# Patient Record
Sex: FEMALE | Race: BLACK OR AFRICAN AMERICAN | NOT HISPANIC OR LATINO | Employment: FULL TIME | URBAN - METROPOLITAN AREA
[De-identification: names, ages, dates, MRNs, and addresses within clinical notes are randomized per-mention and may not be internally consistent; named-entity substitution may affect disease eponyms.]

---

## 2018-08-14 PROBLEM — M16.11 PRIMARY OSTEOARTHRITIS OF RIGHT HIP: Status: ACTIVE | Noted: 2018-08-14

## 2018-08-14 NOTE — H&P
Subjective:     Long history of worsening pain in the right hip.  Multiple arthritic conditions all over her body.  Prior left hip replacement has done well over the years now requesting on the right    Patient Active Problem List    Diagnosis Date Noted    Primary osteoarthritis of right hip 08/14/2018    Coracoid impingement of left shoulder 07/15/2016     Past Medical History:   Diagnosis Date    Arthritis       Past Surgical History:   Procedure Laterality Date    CHOLECYSTECTOMY      HYSTERECTOMY      JOINT REPLACEMENT Left     hip, bilateral knees    TONSILLECTOMY        No medications prior to admission.     Review of patient's allergies indicates:   Allergen Reactions    Percocet [oxycodone-acetaminophen] Other (See Comments)     Hallucinations  Can take Ultram    Vicodin [hydrocodone-acetaminophen] Other (See Comments)     Hallucinations      Social History     Tobacco Use    Smoking status: Never Smoker   Substance Use Topics    Alcohol use: Yes     Alcohol/week: 0.6 oz     Types: 1 Glasses of wine per week     Comment: occasionally      No family history on file.   Review of Systems  Pertinent items are noted in HPI.    Objective:     No data found.  Heart regular lungs clear decreased rotation right hip with pain in the right groin region in significant limp    Imaging Review  Loss of joint space with sclerosis osteophyte formation    Assessment:     Active Hospital Problems    Diagnosis  POA    *Primary osteoarthritis of right hip [M16.11]  Yes      Resolved Hospital Problems   No resolved problems to display.       Plan:     The various methods of treatment have been discussed with the patient and family.   After consideration of risks, benefits and other options for treatment, the patient has consented to surgical interventions (Significant risks discussed hip replacement hopefully same success as on the left).  Questions were answered and Pre-op teaching was done by me.

## 2018-08-17 ENCOUNTER — HOSPITAL ENCOUNTER (OUTPATIENT)
Dept: PREADMISSION TESTING | Facility: OTHER | Age: 73
Discharge: HOME | End: 2018-08-17
Attending: ORTHOPAEDIC SURGERY
Payer: COMMERCIAL

## 2018-08-17 ENCOUNTER — ANESTHESIA EVENT (OUTPATIENT)
Dept: SURGERY | Facility: OTHER | Age: 73
DRG: 470 | End: 2018-08-17
Payer: COMMERCIAL

## 2018-08-17 VITALS
BODY MASS INDEX: 33.34 KG/M2 | OXYGEN SATURATION: 98 % | SYSTOLIC BLOOD PRESSURE: 134 MMHG | HEART RATE: 51 BPM | DIASTOLIC BLOOD PRESSURE: 71 MMHG | WEIGHT: 220 LBS | TEMPERATURE: 99 F | HEIGHT: 68 IN

## 2018-08-17 LAB
ANION GAP SERPL CALC-SCNC: 8 MMOL/L
BACTERIA #/AREA URNS HPF: ABNORMAL /HPF
BASOPHILS # BLD AUTO: 0.04 K/UL
BASOPHILS NFR BLD: 0.7 %
BILIRUB UR QL STRIP: NEGATIVE
BUN SERPL-MCNC: 17 MG/DL
CALCIUM SERPL-MCNC: 9.8 MG/DL
CHLORIDE SERPL-SCNC: 105 MMOL/L
CLARITY UR: CLEAR
CO2 SERPL-SCNC: 26 MMOL/L
COLOR UR: YELLOW
CREAT SERPL-MCNC: 1 MG/DL
DIFFERENTIAL METHOD: ABNORMAL
EOSINOPHIL # BLD AUTO: 0.3 K/UL
EOSINOPHIL NFR BLD: 5.3 %
ERYTHROCYTE [DISTWIDTH] IN BLOOD BY AUTOMATED COUNT: 15 %
EST. GFR  (AFRICAN AMERICAN): >60 ML/MIN/1.73 M^2
EST. GFR  (NON AFRICAN AMERICAN): 56 ML/MIN/1.73 M^2
GLUCOSE SERPL-MCNC: 94 MG/DL
GLUCOSE UR QL STRIP: NEGATIVE
HCT VFR BLD AUTO: 39.3 %
HGB BLD-MCNC: 12.4 G/DL
HGB UR QL STRIP: NEGATIVE
KETONES UR QL STRIP: NEGATIVE
LEUKOCYTE ESTERASE UR QL STRIP: ABNORMAL
LYMPHOCYTES # BLD AUTO: 2 K/UL
LYMPHOCYTES NFR BLD: 35.9 %
MCH RBC QN AUTO: 25.3 PG
MCHC RBC AUTO-ENTMCNC: 31.6 G/DL
MCV RBC AUTO: 80 FL
MICROSCOPIC COMMENT: ABNORMAL
MONOCYTES # BLD AUTO: 0.5 K/UL
MONOCYTES NFR BLD: 8.4 %
NEUTROPHILS # BLD AUTO: 2.8 K/UL
NEUTROPHILS NFR BLD: 49.5 %
NITRITE UR QL STRIP: NEGATIVE
PH UR STRIP: 6 [PH] (ref 5–8)
PLATELET # BLD AUTO: 246 K/UL
PMV BLD AUTO: 10.7 FL
POTASSIUM SERPL-SCNC: 4.3 MMOL/L
PROT UR QL STRIP: NEGATIVE
RBC # BLD AUTO: 4.91 M/UL
RBC #/AREA URNS HPF: 2 /HPF (ref 0–4)
SODIUM SERPL-SCNC: 139 MMOL/L
SP GR UR STRIP: >=1.03 (ref 1–1.03)
SQUAMOUS #/AREA URNS HPF: 9 /HPF
URN SPEC COLLECT METH UR: ABNORMAL
UROBILINOGEN UR STRIP-ACNC: NEGATIVE EU/DL
WBC # BLD AUTO: 5.69 K/UL
WBC #/AREA URNS HPF: 7 /HPF (ref 0–5)

## 2018-08-17 PROCEDURE — 36415 COLL VENOUS BLD VENIPUNCTURE: CPT

## 2018-08-17 PROCEDURE — 81000 URINALYSIS NONAUTO W/SCOPE: CPT

## 2018-08-17 PROCEDURE — 85025 COMPLETE CBC W/AUTO DIFF WBC: CPT

## 2018-08-17 PROCEDURE — 80048 BASIC METABOLIC PNL TOTAL CA: CPT

## 2018-08-17 RX ORDER — SODIUM CHLORIDE, SODIUM LACTATE, POTASSIUM CHLORIDE, CALCIUM CHLORIDE 600; 310; 30; 20 MG/100ML; MG/100ML; MG/100ML; MG/100ML
INJECTION, SOLUTION INTRAVENOUS CONTINUOUS
Status: CANCELLED | OUTPATIENT
Start: 2018-08-17

## 2018-08-17 RX ORDER — FLUCONAZOLE 150 MG/1
150 TABLET ORAL DAILY
COMMUNITY
End: 2018-10-10

## 2018-08-17 RX ORDER — IBUPROFEN AND FAMOTIDINE 26.6; 8 MG/1; MG/1
1 TABLET ORAL 3 TIMES DAILY
COMMUNITY
End: 2018-10-10

## 2018-08-17 NOTE — DISCHARGE INSTRUCTIONS
PRE-ADMIT TESTING -  [phone]    [address]  [place] [facility]          Your surgery has been scheduled at [facility]. We are pleased to have the opportunity to serve you. For Further Information please call [phone].    On the day of surgery please report to the Information Desk on the 1st floor.    · CONTACT YOUR PHYSICIAN'S OFFICE THE DAY PRIOR TO YOUR SURGERY TO OBTAIN YOUR ARRIVAL TIME.     · The evening before surgery do not eat anything after 9 p.m. ( this includes hard candy, chewing gum and mints).  You may only have GATORADE, POWERADE AND WATER  from 9 p.m. until you leave your home.   DO NOT DRINK ANY LIQUIDS ON THE WAY TO THE HOSPITAL.      SPECIAL MEDICATION INSTRUCTIONS: TAKE medications checked off by the Anesthesiologist on your Medication List.    Angiogram Patients: Take medications as instructed by your physician, including aspirin.     Surgery Patients:    If you take ASPIRIN - Your PHYSICIAN/SURGEON will need to inform you IF/OR when you need to stop taking aspirin prior to your surgery.     Do Not take any medications containing IBUPROFEN.  Do Not Wear any make-up or dark nail polish   (especially eye make-up) to surgery. If you come to surgery with makeup on you will be required to remove the makeup or nail polish.    Do not shave your surgical area at least 5 days prior to your surgery. The surgical prep will be performed at the hospital according to Infection Control regulations.    Leave all valuables at home.   Do Not wear any jewelry or watches, including any metal in body piercings.  Contact Lens must be removed before surgery. Either do not wear the contact lens or bring a case and solution for storage.  Please bring a container for eyeglasses or dentures as required.  Bring any paperwork your physician has provided, such as consent forms,  history and physicals, doctor's orders, etc.   Bring comfortable clothes that are loose fitting to wear upon  discharge. Take into consideration the type of surgery being performed.  Maintain your diet as advised per your physician the day prior to surgery.      Adequate rest the night before surgery is advised.   Park in the Parking lot behind the hospital or in the [facility] across the street from the parking lot. Parking is complimentary.  If you will be discharged the same day as your procedure, please arrange for a responsible adult to drive you home or to accompany you if traveling by taxi.   YOU WILL NOT BE PERMITTED TO DRIVE OR TO LEAVE THE HOSPITAL ALONE AFTER SURGERY.   It is strongly recommended that you arrange for someone to remain with you for the first 24 hrs following your surgery.       Thank you for your cooperation.  The Staff of [facility].        Bathing Instructions                                                                 Please shower the evening before and morning of your procedure with    ANTIBACTERIAL SOAP. ( DIAL, etc )  Concentrate on the surgical area   for at least 3 minutes and rinse completely. Dry off as usual.   Do not use any deodorant, powder, body lotions, perfume, after shave or    cologne.

## 2018-08-17 NOTE — ANESTHESIA PREPROCEDURE EVALUATION
08/17/2018  Thao Perera is a 72 y.o., female.    Anesthesia Evaluation    I have reviewed the Patient Summary Reports.    I have reviewed the Nursing Notes.   I have reviewed the Medications.     Review of Systems  Anesthesia Hx:  No problems with previous Anesthesia  Denies Family Hx of Anesthesia complications.   Denies Personal Hx of Anesthesia complications.   Social:  Non-Smoker    Hematology/Oncology:  Hematology Normal   Oncology Normal     EENT/Dental:EENT/Dental Normal   Cardiovascular:  Cardiovascular Normal Exercise tolerance: good     Pulmonary:  Pulmonary Normal    Renal/:  Renal/ Normal     Musculoskeletal:   Arthritis     Neurological:  Neurology Normal    Endocrine:  Endocrine Normal    Dermatological:  Skin Normal    Psych:  Psychiatric Normal           Physical Exam  General:  Obesity    Airway/Jaw/Neck:  Airway Findings: Mouth Opening: Normal Tongue: Normal  General Airway Assessment: Adult  Mallampati: I  TM Distance: Normal, at least 6 cm  Jaw/Neck Findings:  Neck ROM: Normal ROM      Dental:  Dental Findings: In tact             Anesthesia Plan  Type of Anesthesia, risks & benefits discussed:  Anesthesia Type:  general, spinal  Patient's Preference:   Intra-op Monitoring Plan:   Intra-op Monitoring Plan Comments:   Post Op Pain Control Plan:   Post Op Pain Control Plan Comments:   Induction:    Beta Blocker:         Informed Consent: Patient understands risks and agrees with Anesthesia plan.  Questions answered.   ASA Score: 2     Day of Surgery Review of History & Physical:    H&P update referred to the surgeon.     Anesthesia Plan Notes: Patient leaning toward spinal but will decide on day of. May still be on ibuprofen on day of surgery so may factor in.        Ready For Surgery From Anesthesia Perspective.

## 2018-08-28 NOTE — NURSING
Total Joint Replacement Questionnaire     Thao CAMDEN Perera participated in Joint Class over the telephone  1. Have you ever had a Joint Replacement?   [x]Yes  [] No    2. How many stairs/steps do you have to enter your home? 0  When going up, on what side is the railing?  [] Left  [] Right  [] Bilateral  [x] None    3. Do you own any Durable Medical Equipment?   [x] Rolling Walker  [] Standard Walker  [] Rollator  [x] Cane  [] Crutches  [x] Bed Side Commode  [] Hip Kit  [] Tub Transfer Bench  [] Shower Chair     4. Have you used a Home Health Company before?   [x] Yes  [] No  If Yes, Name of Company: family  Would you like to use this company again?   [x] Yes  [] No  [x] Would you like to use your physician's preference?  [x] Yes  [] No    5. Have you arranged for someone to help you, for at least for 3 days, when you are discharged from the hospital?  [x] Yes  [] No  If Yes, Name(s) of person assisting: carissa Ortega  8/28/2018

## 2018-08-28 NOTE — DISCHARGE INSTRUCTIONS
Hip Replacement Discharge Instructions    1. Pain:  a. After surgery you may feel some pain in the operative leg groin area. This is normal. Your hip will likely have been injected with a numbing medicine (Exparel) prior to completion of surgery for pain control. This is indicated on a green bracelet that you will wear for 4 days after surgery. You will also get a prescription for pain control before you leave the hospital.  b. Elevate your leg when sitting for comfort  2. Incision Care:  a. Some drainage from the incision in the first 72 hours is normal. If drainage is excessive, remove bandage,  pat dry, cover with sterile gauze, and secure with tape. Notify M.D. for excessive drainage.  b. Staples will be removed 14 days after surgery.  3. Activity:  a. See attached hip precautions and follow for 6 weeks (instructions found at the end of packet):  b. Perform exercises 3 times a day.  c. Use a hard, flat surface, such as a firm mattress, when exercising.  d. .DR HERNANDEZ PATIENTS CANNOT SHOWER UNTIL STAPLES ARE REMOVED. Support/help is mandatory during showering. If dressing becomes wet, replace with a new dressing. No bathing or swimming for 6 weeks or until incision is completely healed.  e. Wear migel hose for 3 weeks after surgery. You may remove for 1-2 hours during the day only.Send patient home with an extra pair migel hose.  4. Safety:  a. Add cushions to low chairs and car seats for elevation.  b. When getting in and out of a car, it is important to keep your leg straight and out to the side. Wear a seatbelt at all times.  c. You will be given a raised toilet seat (3-1 commode).  d. Use a walker, cane, or crutches as long as M.D. recommends.  5. Possible Complications: Report to Surgeon  a. Infection  i. Unexpected redness  ii. Persistent drainage  iii. Temperature ,can be treated with tylenol. Do not go to the emergency room or urgent care for a temperature, call your surgeon.   iii. Additional  swelling  iv. Pain not controlled with current pain medicine  b. Blood clot  i. Unusual pain  ii. Red or discolored skin  iii. Swelling  iv. Unusual warm skin  c. Dislocation  i. Severe hip pain especially when moved  ii. The injured leg is shorter than the uninjured leg  iii. The injured leg lies in an abnormal position. In most cases the leg is bent at the hip, turned inward and pulled toward the middle of the body.

## 2018-09-13 ENCOUNTER — ANESTHESIA (OUTPATIENT)
Dept: SURGERY | Facility: OTHER | Age: 73
DRG: 470 | End: 2018-09-13
Payer: COMMERCIAL

## 2018-09-13 ENCOUNTER — HOSPITAL ENCOUNTER (INPATIENT)
Facility: OTHER | Age: 73
LOS: 2 days | Discharge: HOME HEALTH | DRG: 470 | End: 2018-09-15
Attending: ORTHOPAEDIC SURGERY | Admitting: ORTHOPAEDIC SURGERY
Payer: COMMERCIAL

## 2018-09-13 DIAGNOSIS — M16.9 OA (OSTEOARTHRITIS) OF HIP: ICD-10-CM

## 2018-09-13 DIAGNOSIS — M16.11 PRIMARY OSTEOARTHRITIS OF RIGHT HIP: Primary | ICD-10-CM

## 2018-09-13 PROCEDURE — 63600175 PHARM REV CODE 636 W HCPCS: Performed by: ANESTHESIOLOGY

## 2018-09-13 PROCEDURE — 94761 N-INVAS EAR/PLS OXIMETRY MLT: CPT

## 2018-09-13 PROCEDURE — 71000033 HC RECOVERY, INTIAL HOUR: Performed by: ORTHOPAEDIC SURGERY

## 2018-09-13 PROCEDURE — 25000003 PHARM REV CODE 250: Performed by: INTERNAL MEDICINE

## 2018-09-13 PROCEDURE — 25000003 PHARM REV CODE 250: Performed by: ORTHOPAEDIC SURGERY

## 2018-09-13 PROCEDURE — 94799 UNLISTED PULMONARY SVC/PX: CPT

## 2018-09-13 PROCEDURE — 25000003 PHARM REV CODE 250: Performed by: ANESTHESIOLOGY

## 2018-09-13 PROCEDURE — 11000001 HC ACUTE MED/SURG PRIVATE ROOM

## 2018-09-13 PROCEDURE — 82495 ASSAY OF CHROMIUM: CPT

## 2018-09-13 PROCEDURE — 25000003 PHARM REV CODE 250: Performed by: NURSE ANESTHETIST, CERTIFIED REGISTERED

## 2018-09-13 PROCEDURE — 99900035 HC TECH TIME PER 15 MIN (STAT)

## 2018-09-13 PROCEDURE — 83018 HEAVY METAL QUAN EACH NES: CPT

## 2018-09-13 PROCEDURE — C1776 JOINT DEVICE (IMPLANTABLE): HCPCS | Performed by: ORTHOPAEDIC SURGERY

## 2018-09-13 PROCEDURE — C9290 INJ, BUPIVACAINE LIPOSOME: HCPCS | Performed by: ORTHOPAEDIC SURGERY

## 2018-09-13 PROCEDURE — 88304 TISSUE EXAM BY PATHOLOGIST: CPT | Mod: 26,,, | Performed by: PATHOLOGY

## 2018-09-13 PROCEDURE — 63600175 PHARM REV CODE 636 W HCPCS: Performed by: ORTHOPAEDIC SURGERY

## 2018-09-13 PROCEDURE — 88311 DECALCIFY TISSUE: CPT | Mod: 26,,, | Performed by: PATHOLOGY

## 2018-09-13 PROCEDURE — 71000039 HC RECOVERY, EACH ADD'L HOUR: Performed by: ORTHOPAEDIC SURGERY

## 2018-09-13 PROCEDURE — 97161 PT EVAL LOW COMPLEX 20 MIN: CPT

## 2018-09-13 PROCEDURE — 97116 GAIT TRAINING THERAPY: CPT

## 2018-09-13 PROCEDURE — 63600175 PHARM REV CODE 636 W HCPCS: Performed by: NURSE ANESTHETIST, CERTIFIED REGISTERED

## 2018-09-13 PROCEDURE — 37000008 HC ANESTHESIA 1ST 15 MINUTES: Performed by: ORTHOPAEDIC SURGERY

## 2018-09-13 PROCEDURE — 88311 DECALCIFY TISSUE: CPT | Performed by: PATHOLOGY

## 2018-09-13 PROCEDURE — 37000009 HC ANESTHESIA EA ADD 15 MINS: Performed by: ORTHOPAEDIC SURGERY

## 2018-09-13 PROCEDURE — 36000710: Performed by: ORTHOPAEDIC SURGERY

## 2018-09-13 PROCEDURE — 36000711: Performed by: ORTHOPAEDIC SURGERY

## 2018-09-13 PROCEDURE — 27201423 OPTIME MED/SURG SUP & DEVICES STERILE SUPPLY: Performed by: ORTHOPAEDIC SURGERY

## 2018-09-13 PROCEDURE — 97530 THERAPEUTIC ACTIVITIES: CPT

## 2018-09-13 PROCEDURE — 36415 COLL VENOUS BLD VENIPUNCTURE: CPT

## 2018-09-13 DEVICE — HEAD FEMORAL BIOLOX 28MM: Type: IMPLANTABLE DEVICE | Site: HIP | Status: FUNCTIONAL

## 2018-09-13 DEVICE — FEMORAL ADPT TYPE 1 TAPER 6MM: Type: IMPLANTABLE DEVICE | Site: HIP | Status: FUNCTIONAL

## 2018-09-13 DEVICE — IMPLANTABLE DEVICE: Type: IMPLANTABLE DEVICE | Site: HIP | Status: FUNCTIONAL

## 2018-09-13 DEVICE — SHELL 3HOLE 50MM G7 OSSEOTI: Type: IMPLANTABLE DEVICE | Site: HIP | Status: FUNCTIONAL

## 2018-09-13 RX ORDER — HYDROCODONE BITARTRATE AND ACETAMINOPHEN 10; 325 MG/1; MG/1
1 TABLET ORAL EVERY 4 HOURS PRN
Status: DISCONTINUED | OUTPATIENT
Start: 2018-09-13 | End: 2018-09-13

## 2018-09-13 RX ORDER — SODIUM CHLORIDE, SODIUM LACTATE, POTASSIUM CHLORIDE, CALCIUM CHLORIDE 600; 310; 30; 20 MG/100ML; MG/100ML; MG/100ML; MG/100ML
INJECTION, SOLUTION INTRAVENOUS CONTINUOUS
Status: DISCONTINUED | OUTPATIENT
Start: 2018-09-13 | End: 2018-09-13

## 2018-09-13 RX ORDER — HYDROCODONE BITARTRATE AND ACETAMINOPHEN 5; 325 MG/1; MG/1
1 TABLET ORAL EVERY 4 HOURS PRN
Status: DISCONTINUED | OUTPATIENT
Start: 2018-09-13 | End: 2018-09-13

## 2018-09-13 RX ORDER — RAMELTEON 8 MG/1
8 TABLET ORAL NIGHTLY PRN
Status: DISCONTINUED | OUTPATIENT
Start: 2018-09-13 | End: 2018-09-15 | Stop reason: HOSPADM

## 2018-09-13 RX ORDER — SODIUM CHLORIDE 0.9 % (FLUSH) 0.9 %
5 SYRINGE (ML) INJECTION
Status: DISCONTINUED | OUTPATIENT
Start: 2018-09-13 | End: 2018-09-15 | Stop reason: HOSPADM

## 2018-09-13 RX ORDER — IBUPROFEN 400 MG/1
400 TABLET ORAL EVERY 6 HOURS PRN
Status: DISCONTINUED | OUTPATIENT
Start: 2018-09-13 | End: 2018-09-15 | Stop reason: HOSPADM

## 2018-09-13 RX ORDER — FENTANYL CITRATE 50 UG/ML
25 INJECTION, SOLUTION INTRAMUSCULAR; INTRAVENOUS EVERY 5 MIN PRN
Status: DISCONTINUED | OUTPATIENT
Start: 2018-09-13 | End: 2018-09-13 | Stop reason: HOSPADM

## 2018-09-13 RX ORDER — CEFAZOLIN SODIUM 2 G/50ML
2 SOLUTION INTRAVENOUS
Status: COMPLETED | OUTPATIENT
Start: 2018-09-13 | End: 2018-09-14

## 2018-09-13 RX ORDER — OXYCODONE HYDROCHLORIDE 5 MG/1
5 TABLET ORAL
Status: DISCONTINUED | OUTPATIENT
Start: 2018-09-13 | End: 2018-09-13 | Stop reason: HOSPADM

## 2018-09-13 RX ORDER — POLYETHYLENE GLYCOL 3350 17 G/17G
17 POWDER, FOR SOLUTION ORAL DAILY
Status: DISCONTINUED | OUTPATIENT
Start: 2018-09-13 | End: 2018-09-15 | Stop reason: HOSPADM

## 2018-09-13 RX ORDER — MEPERIDINE HYDROCHLORIDE 50 MG/ML
12.5 INJECTION INTRAMUSCULAR; INTRAVENOUS; SUBCUTANEOUS ONCE AS NEEDED
Status: DISCONTINUED | OUTPATIENT
Start: 2018-09-13 | End: 2018-09-13 | Stop reason: HOSPADM

## 2018-09-13 RX ORDER — OXYCODONE HYDROCHLORIDE 5 MG/1
5 TABLET ORAL EVERY 4 HOURS PRN
Status: DISCONTINUED | OUTPATIENT
Start: 2018-09-13 | End: 2018-09-15 | Stop reason: HOSPADM

## 2018-09-13 RX ORDER — CELECOXIB 200 MG/1
200 CAPSULE ORAL 2 TIMES DAILY
Status: DISCONTINUED | OUTPATIENT
Start: 2018-09-13 | End: 2018-09-13

## 2018-09-13 RX ORDER — ROPIVACAINE HYDROCHLORIDE 5 MG/ML
INJECTION, SOLUTION EPIDURAL; INFILTRATION; PERINEURAL
Status: COMPLETED | OUTPATIENT
Start: 2018-09-13 | End: 2018-09-13

## 2018-09-13 RX ORDER — EPHEDRINE SULFATE 50 MG/ML
INJECTION, SOLUTION INTRAVENOUS
Status: DISCONTINUED | OUTPATIENT
Start: 2018-09-13 | End: 2018-09-13

## 2018-09-13 RX ORDER — ONDANSETRON 2 MG/ML
4 INJECTION INTRAMUSCULAR; INTRAVENOUS DAILY PRN
Status: DISCONTINUED | OUTPATIENT
Start: 2018-09-13 | End: 2018-09-13 | Stop reason: HOSPADM

## 2018-09-13 RX ORDER — PREGABALIN 75 MG/1
75 CAPSULE ORAL 2 TIMES DAILY
Status: DISCONTINUED | OUTPATIENT
Start: 2018-09-13 | End: 2018-09-15 | Stop reason: HOSPADM

## 2018-09-13 RX ORDER — HYDROMORPHONE HYDROCHLORIDE 2 MG/ML
0.4 INJECTION, SOLUTION INTRAMUSCULAR; INTRAVENOUS; SUBCUTANEOUS EVERY 5 MIN PRN
Status: DISCONTINUED | OUTPATIENT
Start: 2018-09-13 | End: 2018-09-13 | Stop reason: HOSPADM

## 2018-09-13 RX ORDER — PROPOFOL 10 MG/ML
VIAL (ML) INTRAVENOUS CONTINUOUS PRN
Status: DISCONTINUED | OUTPATIENT
Start: 2018-09-13 | End: 2018-09-13

## 2018-09-13 RX ORDER — MUPIROCIN 20 MG/G
OINTMENT TOPICAL
Status: DISCONTINUED | OUTPATIENT
Start: 2018-09-13 | End: 2018-09-13

## 2018-09-13 RX ORDER — OXYCODONE HYDROCHLORIDE 5 MG/1
10 TABLET ORAL EVERY 6 HOURS PRN
Status: DISCONTINUED | OUTPATIENT
Start: 2018-09-13 | End: 2018-09-15 | Stop reason: HOSPADM

## 2018-09-13 RX ORDER — DEXTROSE MONOHYDRATE AND SODIUM CHLORIDE 5; .9 G/100ML; G/100ML
INJECTION, SOLUTION INTRAVENOUS CONTINUOUS
Status: DISCONTINUED | OUTPATIENT
Start: 2018-09-13 | End: 2018-09-15 | Stop reason: HOSPADM

## 2018-09-13 RX ORDER — MIDAZOLAM HYDROCHLORIDE 1 MG/ML
INJECTION INTRAMUSCULAR; INTRAVENOUS
Status: DISCONTINUED | OUTPATIENT
Start: 2018-09-13 | End: 2018-09-13

## 2018-09-13 RX ORDER — SODIUM CHLORIDE 0.9 % (FLUSH) 0.9 %
3 SYRINGE (ML) INJECTION
Status: DISCONTINUED | OUTPATIENT
Start: 2018-09-13 | End: 2018-09-15 | Stop reason: HOSPADM

## 2018-09-13 RX ORDER — SODIUM CHLORIDE 0.9 % (FLUSH) 0.9 %
3 SYRINGE (ML) INJECTION
Status: ACTIVE | OUTPATIENT
Start: 2018-09-13

## 2018-09-13 RX ORDER — METOCLOPRAMIDE HYDROCHLORIDE 5 MG/ML
10 INJECTION INTRAMUSCULAR; INTRAVENOUS ONCE
Status: COMPLETED | OUTPATIENT
Start: 2018-09-13 | End: 2018-09-13

## 2018-09-13 RX ORDER — ONDANSETRON 8 MG/1
8 TABLET, ORALLY DISINTEGRATING ORAL EVERY 8 HOURS PRN
Status: DISCONTINUED | OUTPATIENT
Start: 2018-09-13 | End: 2018-09-15 | Stop reason: HOSPADM

## 2018-09-13 RX ORDER — LIDOCAINE HCL/PF 100 MG/5ML
SYRINGE (ML) INTRAVENOUS
Status: DISCONTINUED | OUTPATIENT
Start: 2018-09-13 | End: 2018-09-13

## 2018-09-13 RX ORDER — FAMOTIDINE 20 MG/1
20 TABLET, FILM COATED ORAL 2 TIMES DAILY
Status: DISCONTINUED | OUTPATIENT
Start: 2018-09-13 | End: 2018-09-15 | Stop reason: HOSPADM

## 2018-09-13 RX ORDER — MORPHINE SULFATE 4 MG/ML
4 INJECTION, SOLUTION INTRAMUSCULAR; INTRAVENOUS
Status: DISCONTINUED | OUTPATIENT
Start: 2018-09-13 | End: 2018-09-15 | Stop reason: HOSPADM

## 2018-09-13 RX ORDER — BUPIVACAINE HCL/EPINEPHRINE 0.25-.0005
VIAL (ML) INJECTION
Status: DISCONTINUED | OUTPATIENT
Start: 2018-09-13 | End: 2018-09-13 | Stop reason: HOSPADM

## 2018-09-13 RX ORDER — PROPOFOL 10 MG/ML
VIAL (ML) INTRAVENOUS
Status: DISCONTINUED | OUTPATIENT
Start: 2018-09-13 | End: 2018-09-13

## 2018-09-13 RX ORDER — NAPROXEN SODIUM 220 MG/1
81 TABLET, FILM COATED ORAL 2 TIMES DAILY
Status: DISCONTINUED | OUTPATIENT
Start: 2018-09-13 | End: 2018-09-15 | Stop reason: HOSPADM

## 2018-09-13 RX ORDER — TRANEXAMIC ACID 100 MG/ML
INJECTION, SOLUTION INTRAVENOUS
Status: DISCONTINUED | OUTPATIENT
Start: 2018-09-13 | End: 2018-09-13 | Stop reason: HOSPADM

## 2018-09-13 RX ORDER — ACETAMINOPHEN 10 MG/ML
INJECTION, SOLUTION INTRAVENOUS
Status: DISCONTINUED | OUTPATIENT
Start: 2018-09-13 | End: 2018-09-13

## 2018-09-13 RX ORDER — CEFAZOLIN SODIUM 2 G/50ML
2 SOLUTION INTRAVENOUS
Status: COMPLETED | OUTPATIENT
Start: 2018-09-13 | End: 2018-09-13

## 2018-09-13 RX ADMIN — METOCLOPRAMIDE 10 MG: 5 INJECTION, SOLUTION INTRAMUSCULAR; INTRAVENOUS at 03:09

## 2018-09-13 RX ADMIN — MIDAZOLAM HYDROCHLORIDE 2 MG: 1 INJECTION, SOLUTION INTRAMUSCULAR; INTRAVENOUS at 06:09

## 2018-09-13 RX ADMIN — EPHEDRINE SULFATE 5 MG: 50 INJECTION INTRAMUSCULAR; INTRAVENOUS; SUBCUTANEOUS at 06:09

## 2018-09-13 RX ADMIN — FENTANYL CITRATE 25 MCG: 50 INJECTION, SOLUTION INTRAMUSCULAR; INTRAVENOUS at 10:09

## 2018-09-13 RX ADMIN — MUPIROCIN: 20 OINTMENT TOPICAL at 05:09

## 2018-09-13 RX ADMIN — MORPHINE SULFATE 4 MG: 4 INJECTION, SOLUTION INTRAMUSCULAR; INTRAVENOUS at 03:09

## 2018-09-13 RX ADMIN — PROPOFOL 20 MG: 10 INJECTION, EMULSION INTRAVENOUS at 07:09

## 2018-09-13 RX ADMIN — ROPIVACAINE HYDROCHLORIDE 3 ML: 5 INJECTION, SOLUTION EPIDURAL; INFILTRATION; PERINEURAL at 06:09

## 2018-09-13 RX ADMIN — FAMOTIDINE 20 MG: 20 TABLET ORAL at 08:09

## 2018-09-13 RX ADMIN — CEFAZOLIN SODIUM 2 G: 2 SOLUTION INTRAVENOUS at 03:09

## 2018-09-13 RX ADMIN — EPHEDRINE SULFATE 10 MG: 50 INJECTION INTRAMUSCULAR; INTRAVENOUS; SUBCUTANEOUS at 07:09

## 2018-09-13 RX ADMIN — ONDANSETRON 8 MG: 8 TABLET, ORALLY DISINTEGRATING ORAL at 07:09

## 2018-09-13 RX ADMIN — IBUPROFEN 400 MG: 400 TABLET ORAL at 11:09

## 2018-09-13 RX ADMIN — PREGABALIN 75 MG: 75 CAPSULE ORAL at 08:09

## 2018-09-13 RX ADMIN — PROPOFOL 75 MCG/KG/MIN: 10 INJECTION, EMULSION INTRAVENOUS at 07:09

## 2018-09-13 RX ADMIN — ONDANSETRON 8 MG: 8 TABLET, ORALLY DISINTEGRATING ORAL at 12:09

## 2018-09-13 RX ADMIN — PREGABALIN 75 MG: 75 CAPSULE ORAL at 03:09

## 2018-09-13 RX ADMIN — PROMETHAZINE HYDROCHLORIDE 6.25 MG: 25 INJECTION INTRAMUSCULAR; INTRAVENOUS at 02:09

## 2018-09-13 RX ADMIN — ACETAMINOPHEN 1000 MG: 10 INJECTION, SOLUTION INTRAVENOUS at 07:09

## 2018-09-13 RX ADMIN — SODIUM CHLORIDE, SODIUM LACTATE, POTASSIUM CHLORIDE, AND CALCIUM CHLORIDE: 600; 310; 30; 20 INJECTION, SOLUTION INTRAVENOUS at 06:09

## 2018-09-13 RX ADMIN — OXYCODONE HYDROCHLORIDE 5 MG: 5 TABLET ORAL at 10:09

## 2018-09-13 RX ADMIN — HYDROMORPHONE HYDROCHLORIDE 0.4 MG: 2 INJECTION INTRAMUSCULAR; INTRAVENOUS; SUBCUTANEOUS at 11:09

## 2018-09-13 RX ADMIN — PROPOFOL 10 MG: 10 INJECTION, EMULSION INTRAVENOUS at 07:09

## 2018-09-13 RX ADMIN — OXYCODONE HYDROCHLORIDE 10 MG: 5 TABLET ORAL at 01:09

## 2018-09-13 RX ADMIN — EPHEDRINE SULFATE 15 MG: 50 INJECTION INTRAMUSCULAR; INTRAVENOUS; SUBCUTANEOUS at 08:09

## 2018-09-13 RX ADMIN — EPHEDRINE SULFATE 10 MG: 50 INJECTION INTRAMUSCULAR; INTRAVENOUS; SUBCUTANEOUS at 06:09

## 2018-09-13 RX ADMIN — CEFAZOLIN SODIUM 2 G: 2 SOLUTION INTRAVENOUS at 07:09

## 2018-09-13 RX ADMIN — SODIUM CHLORIDE, SODIUM LACTATE, POTASSIUM CHLORIDE, AND CALCIUM CHLORIDE: 600; 310; 30; 20 INJECTION, SOLUTION INTRAVENOUS at 07:09

## 2018-09-13 RX ADMIN — HYDROMORPHONE HYDROCHLORIDE 0.4 MG: 2 INJECTION INTRAMUSCULAR; INTRAVENOUS; SUBCUTANEOUS at 10:09

## 2018-09-13 RX ADMIN — ASPIRIN 81 MG CHEWABLE TABLET 81 MG: 81 TABLET CHEWABLE at 08:09

## 2018-09-13 RX ADMIN — RAMELTEON 8 MG: 8 TABLET, FILM COATED ORAL at 11:09

## 2018-09-13 RX ADMIN — MORPHINE SULFATE 4 MG: 4 INJECTION, SOLUTION INTRAMUSCULAR; INTRAVENOUS at 07:09

## 2018-09-13 RX ADMIN — CEFAZOLIN SODIUM 2 G: 2 SOLUTION INTRAVENOUS at 11:09

## 2018-09-13 RX ADMIN — LIDOCAINE HYDROCHLORIDE 100 MG: 20 INJECTION, SOLUTION INTRAVENOUS at 08:09

## 2018-09-13 RX ADMIN — DEXTROSE AND SODIUM CHLORIDE: 5; .9 INJECTION, SOLUTION INTRAVENOUS at 12:09

## 2018-09-13 RX ADMIN — ASPIRIN 81 MG CHEWABLE TABLET 81 MG: 81 TABLET CHEWABLE at 03:09

## 2018-09-13 NOTE — PLAN OF CARE
Problem: Patient Care Overview  Goal: Plan of Care Review  Outcome: Ongoing (interventions implemented as appropriate)  Patient up in chair on room air saturations 100%,post-op incentive spirometry refused per pt will try instructions later on today.

## 2018-09-13 NOTE — PROGRESS NOTES
---------------------------------------  ATTN: TEAM DC PLANNING   PER ORTHO MD HHC AND DME    HHC PER PT  /MD  [facility]ICE [facility] - [facility]     DME NEEDS PER MD BSC AND RW -  PT HAS BSC @ [facility]     Karlene Edwards RN  Case management 9/13/20182:08 PM  # [phone] (FAX) [phone]  ---------------------------------------

## 2018-09-13 NOTE — PT/OT/SLP PROGRESS
Occupational Therapy  Not Seen    Thao Perera   MRN: [id number]     Patient not seen for Occupational Therapy today due to ( ) departmental protocol for elective surgery patients.    Patient with Primary osteoarthritis of right hip [M16.11], s/p Procedure(s):  ARTHROPLASTY, HIP 9/13/2018 who will be seen for Occupational Therapy evaluation POD#1.    AMANDA Lundy   9/13/2018

## 2018-09-13 NOTE — INTERVAL H&P NOTE
The patient has been examined and the H&P has been reviewed:    I concur with the findings and no changes have occurred since H&P was written.    Anesthesia/Surgery risks, benefits and alternative options discussed and understood by patient/family.          Active Hospital Problems    Diagnosis  POA    *Primary osteoarthritis of right hip [M16.11]  Yes    OA (osteoarthritis) of hip [M16.9]  Yes      Resolved Hospital Problems   No resolved problems to display.

## 2018-09-13 NOTE — PLAN OF CARE
Problem: Physical Therapy Goal  Goal: Physical Therapy Goal  Goals to be met by 9-16-18.  1. Sup<>sit mod I  2. Sit<>stand with RW mod I  3. amb 150' with RW mod I   -    Comments: Pt with high level of pain restricting her activity but with good participation in therapy.

## 2018-09-13 NOTE — BRIEF OP NOTE
[facility]  Brief Operative Note    SUMMARY     Surgery Date: 9/13/2018     Surgeon(s) and Role:     * Quincy Reyna MD - Primary    Assisting Surgeon: None    Pre-op Diagnosis:  Primary osteoarthritis of right hip [M16.11]    Post-op Diagnosis:  Post-Op Diagnosis Codes:     * Primary osteoarthritis of right hip [M16.11]    Procedure(s) (LRB):  ARTHROPLASTY, HIP (Right)    Anesthesia: Choice    Description of Procedure:  Right total hip replacement Biomet ceramic head    Description of the findings of the procedure:  Osteoarthritis right hip    Estimated Blood Loss: * No values recorded between 9/13/2018  7:25 AM and 9/13/2018  8:17 AM *250         Specimens:   Specimen (12h ago, onward)    Start     Ordered    09/13/18 0758  Specimen to Pathology - Surgery  Once     Comments:  1-Right femoral head     Start Status   09/13/18 0758 Collected (09/13/18 0755)       09/13/18 0758

## 2018-09-13 NOTE — CONSULTS
"Consult Note  IM    Consult Requested By: Quincy Reyna MD  Reason for Consult: osteoarthritis, microcytosis and CKD 3    SUBJECTIVE:     History of Present Illness:   72 y.o. female presents with a scheduled right hip repair.  Epic and paper chart reviewed prior to eval.  Seen in PACU.  Denies CP, reports SOB with exercise.  Reports apprehension answering ROS questions as she is "afraid to give the wrong answer". Patient assured that questions are asked in an effort to help guide care.  Patient verbalizes understanding but declines to discuss her health further.  Patient has a reported allergy to percocet and vicodin.  Dr. Reyna and pharmacy aware, patient has a side effect of hallucinations.  No difficulty with hives, or difficulty breathing.  Tramadol may be a better option but will defer to surgeon.  Followed by Rochel[facility] Campbell for primary care    Past Medical History:   Diagnosis Date    Arthritis      Past Surgical History:   Procedure Laterality Date    APPENDECTOMY      ARTHROSCOPY-SHOULDER Left 7/15/2016    [facility]    CHOLECYSTECTOMY      DECOMPRESSION-SHOULDER-ARTHROSCOPIC Left 7/15/2016    [facility] at [facility]    EYE SURGERY Bilateral     IOL    HYSTERECTOMY      JOINT REPLACEMENT Left     hip, bilateral knees    REPAIR-ROTATOR CUFF / MINI OPEN Left 7/15/2016    [facility] at [facility] OR    TONSILLECTOMY       History reviewed. No pertinent family history.  Social History     Tobacco Use    Smoking status: Never Smoker    Smokeless tobacco: Never Used   Substance Use Topics    Alcohol use: Yes     Alcohol/week: 0.6 oz     Types: 1 Glasses of wine per week     Comment: occasionally    Drug use: No       Review of patient's allergies indicates:   Allergen Reactions    Percocet [oxycodone-acetaminophen] Other (See Comments)     Hallucinations  Can take Ultram, Tylenol #3 and Norco    Vicodin " [hydrocodone-acetaminophen] Other (See Comments)     Hallucinations        Review of Systems:  Constitutional: Unable to obtain  Respiratory: No cough, shortness of breath with exercise  Cardiovascular: Unable to answer  Gastrointestinal: Unable to obtain  Neurological: No confusion or weakness    OBJECTIVE:     Vital Signs (Most Recent)  Temp: 97.4 °F (36.3 °C) (09/13/18 0915)  Pulse: 61 (09/13/18 0930)  Resp: 16 (09/13/18 0930)  BP: (!) 115/59 (09/13/18 0930)  SpO2: 97 % (09/13/18 0930)    Vital Signs Range (Last 24H):  Temp:  [97.4 °F (36.3 °C)-98.9 °F (37.2 °C)]   Pulse:  [58-82]   Resp:  [16]   BP: ()/(51-71)   SpO2:  [96 %-100 %]       Intake/Output Summary (Last 24 hours) at 9/13/2018 0953  Last data filed at 9/13/2018 0900  Gross per 24 hour   Intake 1250 ml   Output 870 ml   Net 380 ml       Physical Exam:  General appearance: Well developed, well nourished, anxious appearing  Eyes:  Conjunctivae/corneas clear. PERRL.  Lungs: Normal respiratory effort,   clear to auscultation bilaterally   Heart: Regular rate and rhythm, S1, S2 normal, no murmur, rub or gautam.  Abdomen: Soft, non-tender non-distended; bowel sounds normal; no masses,  no organomegaly  Extremities: No cyanosis or clubbing. No edema.  +2 pulses BLE, abductor pillow in place  Skin: Skin color, texture, turgor normal. No rashes or lesions, dressing right hip CDI  Neurologic: Normal strength and tone. No focal numbness or weakness   Shane      Laboratory:    Reviewed    Diagnostic Results:      ASSESSMENT/PLAN:     1. Right hip repair (M16.11): per therapy and ortho teams  2. Microcytosis (R71.8): noted on pre-op labs.  Monitor  3. CKD 3 (N18.3): noted on pre-op labs. Monitor. Renally dose meds, avoid nephrotoxins, and monitor I/O's closely.  4. DVT prophy: ASA 81 mg BID, SOL and SCD per ortho    Plan: Thanks for consult, See above recommendations and orders. Will follow along.

## 2018-09-13 NOTE — PROGRESS NOTES
SW met with pt at bedside to complete discharge asessment,  PCP verified and uses [facility] on [address]  Pt's significant other present, Tavo Pelaez [phone] and will provide transportation home.  Pt has BSC and straight cane, will need RW and refused hip kit and SC.  CELESTINA gave pt a list of  agencies and pt would like to be placed with Dr. Reyna's preferred provider and signed choice form.       09/13/18 1322   Discharge Assessment   Assessment Type Discharge Planning Assessment   Confirmed/corrected address and phone number on facesheet? Yes   Assessment information obtained from? Patient   Communicated expected length of stay with patient/caregiver no   Prior to hospitilization cognitive status: Alert/Oriented   Prior to hospitalization functional status: Independent   Current cognitive status: Alert/Oriented   Current Functional Status: Assistive Equipment;Needs Assistance   Lives With spouse   Able to Return to Prior Arrangements yes   Is patient able to care for self after discharge? Unable to determine at this time (comments)   Patient's perception of discharge disposition home or selfcare   Readmission Within The Last 30 Days no previous admission in last 30 days   Patient currently being followed by outpatient case management? No   Patient currently receives any other outside agency services? No   Do you have any problems affording any of your prescribed medications? No   Is the patient taking medications as prescribed? yes   Does the patient have transportation home? Yes   Transportation Available family or friend will provide   Does the patient receive services at the Coumadin Clinic? No   Discharge Plan A Home Health

## 2018-09-13 NOTE — OP NOTE
DATE OF PROCEDURE:  09/13/2018.    CHIEF COMPLAINT AND PRESENT ILLNESS:  A 72-year-old with multiple joint   problems, she has had bilateral knees and left hip replacement, now severe pain   with the right hip, also has a bad back with a spondylolisthesis, but that right   hip significant impairment at this point, difficulty getting around, decreased   rotation.  Consequently, elected for right hip replacement, fully understands   risks and benefits of surgery.    PREOPERATIVE DIAGNOSIS:  Degenerative joint disease, right hip.    POSTOPERATIVE DIAGNOSIS:  Degenerative joint disease, right hip.    PROCEDURE:  Right total hip replacement using Biomet Taperloc instrumentation,   11 lateral stem, -6, 28 ceramic head and neck and a 50 G7 press-fit cup with 28   liner, all press-fit.    SURGEON:  Quincy Reyna M.D.    ASSISTANT:  Rachel Sousa CST.    COMPLICATIONS:  None.    ANESTHESIA:  Spinal anesthesia.    BLOOD LOSS:  Probably about 250 mL    PROCEDURE IN DETAIL:  The patient was brought to the Operating Room and   underwent spinal anesthesia without difficulties, placed in supine position with   a bump underneath the sacrum and the right hip was prepped and draped in the   usual sterile fashion.  Using posterior curved lateral incision, sharp   dissection made down to IT band and gluteus connie fascia.  This was split in   the usual fashion and the [place] approach was performed by splitting the   one-third gluteus medius and vas lateralis off the femur.  The hip was   dislocated atraumatically.  Standard neck cut was performed.  Careful placement   of the acetabular retractors.  The foveal contents were removed.  She had   already medialized.  She really did not have the fovea.  So carefully reaming up   to a 49, which had excellent central and peripheral fixation.  A 50 cup was   then placed 10 degrees of anteversion, 40 degrees of horizontal.  Excellent   fixation obtained.  A 28 liner placed.  Attention  then turned to the femur.    With the cookie cutter, straight reaming and broaching up to an 11, which had no   subsidence, excellent fixation with 11 lateral was the best construct -6, leg   lengths were equal, excellent range of motion and stability.  The appropriate   components were opened and placed atraumatically.  Final reduction, again   excellent range of motion and stability, leg lengths equal.  An Orthocord was   used on the abductor musculature at the trochanter, #1 Vicryl on the rest of   that abductor musculature and #1 Vicryl in the IT band and gluteus maximum   fascia, #1 Vicryl in deep fatty layer, 2-0 Vicryl subcutaneously, staples on the   skin.  Exparel, tranexamic acid and Marcaine were instilled in the soft   tissues.  She was placed in a bulky sterile dressing and brought to the Recovery   Room in stable fashion.        /samir [id number] eric(s)        DONNELL/AZAEL  dd: 09/13/2018 08:20:42 (CDT)  td: 09/13/2018 08:53:25 (CDT)  Doc ID   #[id number]  Job ID #[id number]    CC:

## 2018-09-13 NOTE — ANESTHESIA POSTPROCEDURE EVALUATION
"Anesthesia Post Evaluation    Patient: Thao Perera    Procedure(s) Performed: Procedure(s) (LRB):  ARTHROPLASTY, HIP (Right)    Final Anesthesia Type: spinal  Patient location during evaluation: PACU  Patient participation: Yes- Able to Participate  Level of consciousness: awake and alert and oriented  Post-procedure vital signs: reviewed and stable  Pain management: adequate  Airway patency: patent  PONV status at discharge: No PONV  Anesthetic complications: no      Cardiovascular status: blood pressure returned to baseline and hemodynamically stable  Respiratory status: unassisted  Hydration status: euvolemic  Follow-up not needed.        Visit Vitals  BP (!) 115/59   Pulse 61   Temp 36.3 °C (97.4 °F)   Resp 16   Ht 5' 7.5" (1.715 m)   Wt 99.8 kg (220 lb)   SpO2 97%   Breastfeeding? No   BMI 33.95 kg/m²       Pain/Logan Score: Pain Assessment Performed: Yes (9/13/2018  8:30 AM)  Presence of Pain: denies (9/13/2018  9:00 AM)  Logan Score: 10 (9/13/2018  9:00 AM)        "

## 2018-09-13 NOTE — PT/OT/SLP EVAL
Physical Therapy Evaluation    Patient Name:  Thao Perera   MRN:  [id number]    Recommendations:     Discharge Recommendations:  home, home health PT, home health OT   Discharge Equipment Recommendations: walker, rolling, shower chair, bedside commode   Barriers to discharge: None    Assessment:     Thao Perera is a 72 y.o. female admitted with a medical diagnosis of OA (osteoarthritis) of hip.  She presents with the following impairments/functional limitations:  weakness, impaired endurance, gait instability, impaired functional mobilty, decreased lower extremity function, orthopedic precautions, pain, impaired balance. Pt s/p THR R 9-13-18 now with impaired functional mobility.  Will benefit from hospital PT and HH PT to regain full independent functional mobility.      Rehab Prognosis:  excellent; patient would benefit from acute skilled PT services to address these deficits and reach maximum level of function.      Recent Surgery: Procedure(s) (LRB):  ARTHROPLASTY, HIP (Right) Day of Surgery    Plan:     During this hospitalization, patient to be seen BID to address the above listed problems via gait training, therapeutic activities, therapeutic exercises  · Plan of Care Expires:  10/13/18   Plan of Care Reviewed with: patient, significant other, daughter    Subjective     Communicated with patient daughter and sig other prior to session.  Patient found in bed supine upon PT entry to room, agreeable to evaluation.      Chief Complaint: nausea  Patient comments/goals: pt vomiting when sitting EOB  Pain/Comfort:  · Pain Rating 1: 8/10  · Location - Side 1: Right  · Location 1: hip  · Pain Addressed 1: Pre-medicate for activity, Distraction  · Pain Rating Post-Intervention 1: 8/10    Patients cultural, spiritual, Oriental orthodox conflicts given the current situation: none mentioned    Living Environment:  Live with sig other in [facility] with elevator access. Shower in stall without chair.   Prior to admission,  patients level of function was amb I without AD.  Patient has the following equipment:  .  DME owned (not currently used): bedside commode.  Upon discharge, patient will have assistance from daughter and significant other.    Objective:     Patient found with: peripheral IV, SCD, duncan catheter, oxygen     General Precautions: Standard, fall   Orthopedic Precautions:RLE weight bearing as tolerated   Braces: N/A     Exams:  · Cognitive Exam:  Patient is oriented to Person, Place, Time and Situation  · Fine Motor Coordination:    · -       Intact  · Gross Motor Coordination:  WFL  · Postural Exam:  Patient presented with the following abnormalities:    · -       Forward head  · Sensation:    · -       Intact  · Skin Integrity/Edema:      · -       Skin integrity: Visible skin intact  · -       Edema: Mild R LE  · RLE ROM: WFL except limited by pain  · RLE Strength: WFL except limited by pain  · LLE ROM: WFL  · LLE Strength: WFL    Functional Mobility:  · Bed Mobility:     · Supine to Sit: minimum assistance  · Transfers:     · Sit to Stand:  minimum assistance with rolling walker  · Gait: amb 10' with RW and CGA  · Balance: F+ standing dynamic balance      AM-PAC 6 CLICK MOBILITY  Total Score:18    Patient left up in chair with all lines intact, call button in reach, nurse notified and family present.    GOALS:   Multidisciplinary Problems     Physical Therapy Goals        Problem: Physical Therapy Goal    Goal Priority Disciplines Outcome Goal Variances Interventions   Physical Therapy Goal     PT, PT/OT      Description:  Goals to be met by 9-16-18.  1. Sup<>sit mod I  2. Sit<>stand with RW mod I  3. amb 150' with RW mod I                    History:     Past Medical History:   Diagnosis Date    Arthritis        Past Surgical History:   Procedure Laterality Date    APPENDECTOMY      ARTHROSCOPY-SHOULDER Left 7/15/2016    [facility]    CHOLECYSTECTOMY       DECOMPRESSION-SHOULDER-ARTHROSCOPIC Left 7/15/2016    [facility]    EYE SURGERY Bilateral     IOL    HYSTERECTOMY      JOINT REPLACEMENT Left     hip, bilateral knees    REPAIR-ROTATOR CUFF / MINI OPEN Left 7/15/2016    Performed by [facility] at [facility] OR    TONSILLECTOMY         Clinical Decision Making:     History  Co-morbidities and personal factors that may impact the plan of care Examination  Body Structures and Functions, activity limitations and participation restrictions that may impact the plan of care Clinical Presentation   Decision Making/ Complexity Score   Co-morbidities:   [] Time since onset of injury / illness / exacerbation  [] Status of current condition  []Patient's cognitive status and safety concerns    [] Multiple Medical Problems (see med hx)  Personal Factors:   [] Patient's age  [] Prior Level of function   [] Patient's home situation (environment and family support)  [] Patient's level of motivation  [] Expected progression of patient      HISTORY:(criteria)    [] 94949 - no personal factors/history    [] 35769 - has 1-2 personal factor/comorbidity     [] 62058 - has >3 personal factor/comorbidity     Body Regions:  [] Objective examination findings  [] Head     []  Neck  [] Trunk   [] Upper Extremity  [] Lower Extremity    Body Systems:  [] For communication ability, affect, cognition, language, and learning style: the assessment of the ability to make needs known, consciousness, orientation (person, place, and time), expected emotional /behavioral responses, and learning preferences (eg, learning barriers, education  needs)  [] For the neuromuscular system: a general assessment of gross coordinated movement (eg, balance, gait, locomotion, transfers, and transitions) and motor function  (motor control and motor learning)  [] For the musculoskeletal system: the assessment of gross symmetry, gross range of motion, gross strength, height,  and weight  [] For the integumentary system: the assessment of pliability(texture), presence of scar formation, skin color, and skin integrity  [] For cardiovascular/pulmonary system: the assessment of heart rate, respiratory rate, blood pressure, and edema     Activity limitations:    [] Patient's cognitive status and saf ety concerns          [] Status of current condition      [] Weight bearing restriction  [] Cardiopulmunary Restriction    Participation Restrictions:   [] Goals and goal agreement with the patient     [] Rehab potential (prognosis) and probable outcome      Examination of Body System: (criteria)    [] 68474 - addressing 1-2 elements    [] 31357 - addressing a total of 3 or more elements     [] 10947 -  Addressing a total of 4 or more elements         Clinical Presentation: (criteria)  Choose one     On examination of body system using standardized tests and measures patient presents with (CHOOSE ONE) elements from any of the following: body structures and functions, activity limitations, and/or participation restrictions.  Leading to a clinical presentation that is considered (CHOOSE ONE)                              Clinical Decision Making  (Eval Complexity):  Choose One     Time Tracking:     PT Received On: 09/13/18  PT Start Time: 1157     PT Stop Time: 1245  PT Total Time (min): 48 min     Billable Minutes: Evaluation 18, Gait Training 15 and Therapeutic Activity 15      Alli Toth, PT  09/13/2018

## 2018-09-13 NOTE — PLAN OF CARE
Problem: Physical Therapy Goal  Goal: Physical Therapy Goal  Goals to be met by 9-16-18.  1. Sup<>sit mod I  2. Sit<>stand with RW mod I  3. amb 150' with RW mod I  -    Comments: Physical therapy eval completed.  Recommend home with  PT.  Will need RW for home use.

## 2018-09-13 NOTE — ANESTHESIA PROCEDURE NOTES
Spinal    Diagnosis: Osteo hip  Patient location during procedure: holding area  Start time: 9/13/2018 6:40 AM  Timeout: 9/13/2018 6:40 AM  End time: 9/13/2018 6:45 AM  Staffing  Anesthesiologist: Mason Mendez MD  Performed: anesthesiologist   Preanesthetic Checklist  Completed: patient identified, site marked, surgical consent, pre-op evaluation, timeout performed, IV checked, risks and benefits discussed and monitors and equipment checked  Spinal Block  Patient position: sitting  Prep: ChloraPrep  Patient monitoring: heart rate, cardiac monitor and continuous pulse ox  Approach: midline  Location: L3-4  Injection technique: single shot  CSF Fluid: clear free-flowing CSF  Needle  Needle type: pencil-tip   Needle gauge: 22 G  Needle length: 3.5 in  Additional Documentation: incremental injection, negative aspiration for heme and no paresthesia on injection  Needle localization: anatomical landmarks  Assessment  Sensory level: T4   Dermatomal levels determined by alcohol wipe  Ease of block: easy  Patient's tolerance of the procedure: comfortable throughout block and no complaints

## 2018-09-13 NOTE — TRANSFER OF CARE
"Anesthesia Transfer of Care Note    Patient: Thao Perera    Procedure(s) Performed: Procedure(s) (LRB):  ARTHROPLASTY, HIP (Right)    Patient location: PACU    Anesthesia Type: spinal    Transport from OR: Transported from OR on room air with adequate spontaneous ventilation    Post pain: adequate analgesia    Post assessment: no apparent anesthetic complications    Post vital signs: stable    Level of consciousness: awake, alert and oriented    Nausea/Vomiting: no nausea/vomiting    Complications: none    Transfer of care protocol was followed      Last vitals:   Visit Vitals  /71 (BP Location: Right arm, Patient Position: Sitting)   Pulse (!) 58   Temp 37.2 °C (98.9 °F) (Oral)   Resp 16   Ht 5' 7.5" (1.715 m)   Wt 99.8 kg (220 lb)   SpO2 96%   Breastfeeding? No   BMI 33.95 kg/m²     "

## 2018-09-13 NOTE — PT/OT/SLP PROGRESS
Physical Therapy Treatment    Patient Name:  Thao Perera   MRN:  [id number]    Recommendations:     Discharge Recommendations:  home, home health PT, home health OT   Discharge Equipment Recommendations: walker, rolling, shower chair, bedside commode   Barriers to discharge: None    Assessment:     Thao Perera is a 72 y.o. female admitted with a medical diagnosis of OA (osteoarthritis) of hip.  She presents with the following impairments/functional limitations:  impaired functional mobilty, gait instability, weakness, impaired balance, decreased lower extremity function, impaired endurance, impaired cognition, pain, orthopedic precautions.  Pt with high level of pain restricting her activity but with good participation in therapy.    Rehab Prognosis:  excellent; patient would benefit from acute skilled PT services to address these deficits and reach maximum level of function.      Recent Surgery: Procedure(s) (LRB):  ARTHROPLASTY, HIP (Right) Day of Surgery    Plan:     During this hospitalization, patient to be seen daily to address the above listed problems via therapeutic activities, gait training, therapeutic exercises  · Plan of Care Expires:  10/13/18   Plan of Care Reviewed with: patient, family    Subjective     Communicated with patient and family prior to session.  Patient found in BS chair upon PT entry to room, agreeable to treatment.      Chief Complaint: nausea  Patient comments/goals: to go home tomorrow  Pain/Comfort:  · Pain Rating 1: 8/10  · Location - Side 1: Right  · Location 1: hip  · Pain Addressed 1: Pre-medicate for activity, Distraction  · Pain Rating Post-Intervention 1: 8/10    Patients cultural, spiritual, Congregational conflicts given the current situation: none mentioned    Objective:     Patient found with: peripheral IV, udncan catheter     General Precautions: Standard, fall   Orthopedic Precautions:RLE weight bearing as tolerated, RLE anterior precautions   Braces: N/A      Functional Mobility:  · Bed Mobility:     · Sit to Supine: minimum assistance  · Transfers:     · Sit to Stand:  minimum assistance with rolling walker  · Gait: amb 40' with RW and min A  · Balance: F+ standing dynamic balance      AM-PAC 6 CLICK MOBILITY  Turning over in bed (including adjusting bedclothes, sheets and blankets)?: 3  Sitting down on and standing up from a chair with arms (e.g., wheelchair, bedside commode, etc.): 3  Moving from lying on back to sitting on the side of the bed?: 3  Moving to and from a bed to a chair (including a wheelchair)?: 3  Need to walk in hospital room?: 3  Climbing 3-5 steps with a railing?: 3  Basic Mobility Total Score: 18       Patient left supine with all lines intact, call button in reach, bed alarm on, nurse notified, family present and in hip ab pillow and scd..    GOALS:   Multidisciplinary Problems     Physical Therapy Goals        Problem: Physical Therapy Goal    Goal Priority Disciplines Outcome Goal Variances Interventions   Physical Therapy Goal     PT, PT/OT      Description:  Goals to be met by 9-16-18.  1. Sup<>sit mod I  2. Sit<>stand with RW mod I  3. amb 150' with RW mod I                    Time Tracking:     PT Received On: 09/13/18  PT Start Time: 1514     PT Stop Time: 1537  PT Total Time (min): 23 min     Billable Minutes: Gait Training 15 and Therapeutic Activity 8    Treatment Type: Treatment  PT/PTA: PT           Alli Toth, PT  09/13/2018

## 2018-09-13 NOTE — PROGRESS NOTES
Pt transferred from PACU via bed AAOX4. VSS on RA. Dressing to R hip CDI. BLE pulses intact. IV fluids infusing per MD order. Room orientation given. Meal tray being ordered.  Pt has call light in reach, side rails up X2, bed in low position, TEDs/SCDs and nonskid socks on. Pt lying in bed in no distress with family at the bedside.

## 2018-09-14 PROBLEM — M16.9 OA (OSTEOARTHRITIS) OF HIP: Status: RESOLVED | Noted: 2018-09-13 | Resolved: 2018-09-14

## 2018-09-14 LAB
ANION GAP SERPL CALC-SCNC: 7 MMOL/L
BASOPHILS # BLD AUTO: 0.01 K/UL
BASOPHILS NFR BLD: 0.1 %
BUN SERPL-MCNC: 10 MG/DL
CALCIUM SERPL-MCNC: 8.5 MG/DL
CHLORIDE SERPL-SCNC: 106 MMOL/L
CO2 SERPL-SCNC: 25 MMOL/L
COBALT SERPL-MCNC: 1.8 NG/ML
CR SERPL-MCNC: 0.8 NG/ML
CREAT SERPL-MCNC: 0.8 MG/DL
DIFFERENTIAL METHOD: ABNORMAL
EOSINOPHIL # BLD AUTO: 0.2 K/UL
EOSINOPHIL NFR BLD: 1.5 %
ERYTHROCYTE [DISTWIDTH] IN BLOOD BY AUTOMATED COUNT: 14.9 %
EST. GFR  (AFRICAN AMERICAN): >60 ML/MIN/1.73 M^2
EST. GFR  (NON AFRICAN AMERICAN): >60 ML/MIN/1.73 M^2
GLUCOSE SERPL-MCNC: 145 MG/DL
HCT VFR BLD AUTO: 32.4 %
HGB BLD-MCNC: 10.2 G/DL
LYMPHOCYTES # BLD AUTO: 2 K/UL
LYMPHOCYTES NFR BLD: 20.5 %
MCH RBC QN AUTO: 25.2 PG
MCHC RBC AUTO-ENTMCNC: 31.5 G/DL
MCV RBC AUTO: 80 FL
MONOCYTES # BLD AUTO: 1.2 K/UL
MONOCYTES NFR BLD: 12.2 %
NEUTROPHILS # BLD AUTO: 6.5 K/UL
NEUTROPHILS NFR BLD: 65.5 %
PLATELET # BLD AUTO: 193 K/UL
PMV BLD AUTO: 10 FL
POTASSIUM SERPL-SCNC: 3.7 MMOL/L
RBC # BLD AUTO: 4.05 M/UL
SODIUM SERPL-SCNC: 138 MMOL/L
WBC # BLD AUTO: 9.87 K/UL

## 2018-09-14 PROCEDURE — 80048 BASIC METABOLIC PNL TOTAL CA: CPT

## 2018-09-14 PROCEDURE — 97116 GAIT TRAINING THERAPY: CPT

## 2018-09-14 PROCEDURE — 85025 COMPLETE CBC W/AUTO DIFF WBC: CPT

## 2018-09-14 PROCEDURE — 97530 THERAPEUTIC ACTIVITIES: CPT

## 2018-09-14 PROCEDURE — 94761 N-INVAS EAR/PLS OXIMETRY MLT: CPT

## 2018-09-14 PROCEDURE — 63600175 PHARM REV CODE 636 W HCPCS: Performed by: ORTHOPAEDIC SURGERY

## 2018-09-14 PROCEDURE — 36415 COLL VENOUS BLD VENIPUNCTURE: CPT

## 2018-09-14 PROCEDURE — 97535 SELF CARE MNGMENT TRAINING: CPT

## 2018-09-14 PROCEDURE — 94799 UNLISTED PULMONARY SVC/PX: CPT

## 2018-09-14 PROCEDURE — 11000001 HC ACUTE MED/SURG PRIVATE ROOM

## 2018-09-14 PROCEDURE — 97166 OT EVAL MOD COMPLEX 45 MIN: CPT

## 2018-09-14 PROCEDURE — 25000003 PHARM REV CODE 250: Performed by: INTERNAL MEDICINE

## 2018-09-14 PROCEDURE — 25000003 PHARM REV CODE 250: Performed by: ORTHOPAEDIC SURGERY

## 2018-09-14 PROCEDURE — 0SR90JA REPLACEMENT OF RIGHT HIP JOINT WITH SYNTHETIC SUBSTITUTE, UNCEMENTED, OPEN APPROACH: ICD-10-PCS | Performed by: ORTHOPAEDIC SURGERY

## 2018-09-14 PROCEDURE — 97110 THERAPEUTIC EXERCISES: CPT

## 2018-09-14 RX ORDER — ONDANSETRON 8 MG/1
8 TABLET, ORALLY DISINTEGRATING ORAL EVERY 8 HOURS PRN
Qty: 20 TABLET | Refills: 0 | Status: SHIPPED | OUTPATIENT
Start: 2018-09-14 | End: 2018-10-10

## 2018-09-14 RX ORDER — ONDANSETRON 2 MG/ML
4 INJECTION INTRAMUSCULAR; INTRAVENOUS ONCE
Status: DISCONTINUED | OUTPATIENT
Start: 2018-09-14 | End: 2018-09-15 | Stop reason: HOSPADM

## 2018-09-14 RX ORDER — FAMOTIDINE 20 MG/1
20 TABLET, FILM COATED ORAL
Status: DISCONTINUED | OUTPATIENT
Start: 2018-09-14 | End: 2018-09-15 | Stop reason: HOSPADM

## 2018-09-14 RX ORDER — IBUPROFEN 400 MG/1
800 TABLET ORAL 3 TIMES DAILY
Status: DISCONTINUED | OUTPATIENT
Start: 2018-09-14 | End: 2018-09-15 | Stop reason: HOSPADM

## 2018-09-14 RX ORDER — NAPROXEN SODIUM 220 MG/1
81 TABLET, FILM COATED ORAL 2 TIMES DAILY
Refills: 0 | COMMUNITY
Start: 2018-09-14 | End: 2018-10-10

## 2018-09-14 RX ADMIN — PREGABALIN 75 MG: 75 CAPSULE ORAL at 08:09

## 2018-09-14 RX ADMIN — ONDANSETRON 8 MG: 8 TABLET, ORALLY DISINTEGRATING ORAL at 09:09

## 2018-09-14 RX ADMIN — FAMOTIDINE 20 MG: 20 TABLET ORAL at 08:09

## 2018-09-14 RX ADMIN — ASPIRIN 81 MG CHEWABLE TABLET 81 MG: 81 TABLET CHEWABLE at 08:09

## 2018-09-14 RX ADMIN — IBUPROFEN 800 MG: 400 TABLET ORAL at 03:09

## 2018-09-14 RX ADMIN — FAMOTIDINE 20 MG: 20 TABLET ORAL at 03:09

## 2018-09-14 RX ADMIN — FAMOTIDINE 20 MG: 20 TABLET ORAL at 06:09

## 2018-09-14 RX ADMIN — IBUPROFEN 400 MG: 400 TABLET ORAL at 06:09

## 2018-09-14 RX ADMIN — POLYETHYLENE GLYCOL 3350 17 G: 17 POWDER, FOR SOLUTION ORAL at 08:09

## 2018-09-14 RX ADMIN — IBUPROFEN 400 MG: 400 TABLET ORAL at 11:09

## 2018-09-14 RX ADMIN — CEFAZOLIN SODIUM 2 G: 2 SOLUTION INTRAVENOUS at 06:09

## 2018-09-14 RX ADMIN — IBUPROFEN 800 MG: 400 TABLET ORAL at 07:09

## 2018-09-14 NOTE — PROGRESS NOTES
Postop day 1.  Right hip replacement.  Difficulties with narcotics.  Difficulty with pain medication.  Ibuprofen 800 with stomach protection probably the best.  She has been on Duexis for a while.  Plan discharge tomorrow.  Hopefully better tolerated at that point

## 2018-09-14 NOTE — PLAN OF CARE
Problem: Patient Care Overview  Goal: Plan of Care Review  Outcome: Ongoing (interventions implemented as appropriate)  Patient on room air.

## 2018-09-14 NOTE — PROGRESS NOTES
"IM  Progress Note    Admit Date: 9/13/2018   LOS: 1 day     SUBJECTIVE:     Follow-up For:  OA (osteoarthritis) of hip    Interval History:    POD #1 right hip repair.  Patient seen in room,  at bedside.  Reports some nausea, but denies calf tenderness, CP,SOB,F,C or vomiting.  Worked well with PT, due to void.    Review of Systems:  Constitutional: No fever or chills  Respiratory: No cough or shortness of breath  Cardiovascular: No chest pain or palpitations  Gastrointestinal: No nausea or vomiting  Neurological: No confusion or weakness    OBJECTIVE:     Vital Signs Range (Last 24H):  /63 (BP Location: Right arm, Patient Position: Lying)   Pulse 67   Temp 99.3 °F (37.4 °C) (Oral)   Resp 18   Ht 5' 7.5" (1.715 m)   Wt 99.8 kg (220 lb)   SpO2 95%   Breastfeeding? No   BMI 33.95 kg/m²     Temp:  [97.4 °F (36.3 °C)-99.3 °F (37.4 °C)]   Pulse:  [58-88]   Resp:  [16-18]   BP: ()/(51-81)   SpO2:  [91 %-100 %]     I & O (Last 24H):    Intake/Output Summary (Last 24 hours) at 9/14/2018 0824  Last data filed at 9/14/2018 0608  Gross per 24 hour   Intake 1815 ml   Output 1625 ml   Net 190 ml       Physical Exam:  General appearance: Well developed, well nourished  Eyes:  Conjunctivae/corneas clear. PERRL.  Lungs: Normal respiratory effort,   clear to auscultation bilaterally   Heart: Regular rate and rhythm, S1, S2 normal, no murmur, rub or gautam.  Abdomen: Soft, non-tender non-distended; bowel sounds normal; no masses,  no organomegaly  Extremities: No cyanosis or clubbing. No edema.  +2 pulses BLE, abductor pillow in place  Skin: Skin color, texture, turgor normal. No rashes or lesions, dressing right hip CDI  Neurologic: Normal strength and tone. No focal numbness or weakness     Laboratory Data:  Recent Labs   Lab  09/14/18   0432   WBC  9.87   RBC  4.05   HGB  10.2*   HCT  32.4*   PLT  193   MCV  80*   MCH  25.2*   MCHC  31.5*       BMP:   Recent Labs   Lab  09/14/18   0432   GLU  145*   NA  " 138   K  3.7   CL  106   CO2  25   BUN  10   CREATININE  0.8   CALCIUM  8.5*     Lab Results   Component Value Date    CALCIUM 8.5 (L) 09/14/2018               Medications:  Medication list was reviewed and changes noted under Assessment/Plan.    Diagnostic Results:    Reviewed    ASSESSMENT/PLAN:     1. Right hip repair (M16.11): POD #1 per therapy and ortho teams  2. Microcytosis/Anemia (R71.8/D62): noted again today. Patient asymptomatic.  3. CKD 3 (N18.3): noted on pre-op labs. Monitor. Renally dose meds, avoid nephrotoxins, and monitor I/O's closely.  eGFR improved today  4. DVT prophy: ASA 81 mg BID, SOL and SCD per ortho       Medically stable for discharge

## 2018-09-14 NOTE — PT/OT/SLP PROGRESS
Physical Therapy Treatment  A.m. And p.m. note    Patient Name:  Thao Perera   MRN:  [id number]    Recommendations:     Discharge Recommendations:  home health PT   Discharge Equipment Recommendations:     Barriers to discharge: None    Assessment:     Thao Perera is a 72 y.o. female admitted with a medical diagnosis of OA (osteoarthritis) of hip.  She presents with the following impairments/functional limitations:  weakness, impaired endurance, impaired self care skills, gait instability, impaired functional mobilty, decreased safety awareness, pain, decreased ROM, orthopedic precautions  Patient was limited in a.m. Session secondary to nausea and vomiting. Patient required increase time to perform task secondary to pain.  Patient improved in p.m. Session but still required constant verbal cues for upright posture and proper sequencing and staying inside walker.     Rehab Prognosis:  good; patient would benefit from acute skilled PT services to address these deficits and reach maximum level of function.      Recent Surgery: Procedure(s) (LRB):  ARTHROPLASTY, HIP (Right) 1 Day Post-Op    Plan:     During this hospitalization, patient to be seen daily to address the above listed problems via therapeutic activities, gait training, therapeutic exercises  · Plan of Care Expires:  10/13/18   Plan of Care Reviewed with: patient, significant other    Subjective     Communicated with nurse prior to session.  Patient found supine in a.m. Seated in bedside chair in p.m.  upon PT entry to room, agreeable to treatment.      Chief Complaint: patient complained of nausea and pain in a.m. Patient was agreeable to participate in p.m   Patient comments/goals: home tomorrow  Pain/Comfort:  · Pain Rating 1: 8/10  · Location - Side 1: Right  · Location - Orientation 1: generalized  · Location 1: hip  · Pain Addressed 1: Cessation of Activity, Reposition, Distraction, Pre-medicate for activity  · Pain Rating Post-Intervention 1:  0/10(at rest seated in bedside chair )    Patients cultural, spiritual, Zoroastrian conflicts given the current situation: none mentioned    Objective:     Patient found with: peripheral IV supine in a.m. Seated in bedside chair in p.m.     General Precautions: Standard, fall   Orthopedic Precautions:RLE weight bearing as tolerated, RLE anterior precautions   Braces: N/A     Functional Mobility: a.m.  · Supine to sit with Min ~ moderate A with use of bed rail and increase time to perform task (25 minutes). Patient required assistance with R LE management and verbal, visual and tactile cues for proper technique.   · Sit to stand from bed with Rolling walker with Moderate A from bed, Min A from bedside chair X 2 trials   · Patient gait trained 30 feet, 70 feet with Rolling walker demo forward flex trunk, decrease step length, antalgic gait and required assistance to advance R LE at times. Patient required a seated rest break and vomited. Nurse was notified.   · Patient performed AP, QS, GS, LAQ, slight right hip flexion AAROMX 20 reps   · Educated patient on anterior precautions.     P.M  Patient performed AP, QS, GS,LAQ, slight hip flexion X 20 reps AROM/AAROM   Educated and patient was able to recall 3/3 anterior precautions   Sit to stand from bedside chair with Rolling walker with SBA for safety verbal cues for hand placement.  Patient gait trained 100 feet with rolling walker with CGA patient demo forward flex trunk, decrease step length, slow bindu, antalgic gait. Constant verbal cues for upright posture and staying inside walker.     AM-PAC 6 CLICK MOBILITY  Turning over in bed (including adjusting bedclothes, sheets and blankets)?: 2  Sitting down on and standing up from a chair with arms (e.g., wheelchair, bedside commode, etc.): 3  Moving from lying on back to sitting on the side of the bed?: 2  Moving to and from a bed to a chair (including a wheelchair)?: 3  Need to walk in hospital room?: 3  Climbing 3-5  steps with a railing?: 2  Basic Mobility Total Score: 15       Patient left up in chair with all lines intact, call button in reach and family present..    GOALS:   Multidisciplinary Problems     Physical Therapy Goals        Problem: Physical Therapy Goal    Goal Priority Disciplines Outcome Goal Variances Interventions   Physical Therapy Goal     PT, PT/OT Ongoing (interventions implemented as appropriate)     Description:  Goals to be met by 9-16-18.  1. Sup<>sit mod I  2. Sit<>stand with RW mod I  3. amb 150' with RW mod I                    Time Tracking:     PT Received On: 09/14/18  PT Start Time: 0900     PT Stop Time: 1010  PT Total Time (min): 70 min     P.m.   4740-3753 33 minutes    Billable Minutes: Gait Training 45 (a.m)., Therapeutic Activity ( 25 a.m.) and  Therapeutic Exercise 15(p.m.) Gait training 18 (p.m.)     Treatment Type: Treatment  PT/PTA: PTA     PTA Visit Number: 1     Kayleigh Yoon, PTA  09/14/2018

## 2018-09-14 NOTE — PLAN OF CARE
Problem: Occupational Therapy Goal  Goal: Occupational Therapy Goal  Goals to be met by: 9/21/2018     Patient will increase functional independence with ADLs by performing:    LE Dressing with Contact Guard Assistance with AE as needed.  Grooming while standing at sink with Supervision.  Toileting from bedside commode with Stand-by Assistance for hygiene and clothing management.   Bathing from  edge of bed with Stand-by Assistance.  Toilet transfer to bedside commode with Stand-by Assistance.    Outcome: Ongoing (interventions implemented as appropriate)  Initial OT eval/treat complete.  Requires verbal cues for safe hand placement for steadying and managing hospital gown in stance and able to clean front rachana region in stance.  [place] bath at BSC with assist for cleaning back rachana hygiene in stance and feet.  Donned pullover gown in stance with CGA with verbal instruction for performing task in sitting, however Pt. Did not want to sit and have to stand again.  Threaded BLE into underwear while seated in bedside chair and required assist for pulling underwear to waist while steadying BUE onto RW. Functional transfers ranged from MOD A to CGA with RW this day with increased verbal cues for RLE positioning and use of BUE at armrests to assist in controlled stance/descent.  Requires consistent/constant verbal cues for upright posture and proximity to RW while in stance and throughout household ambulation and standing ADL; also needs increased verbal cues for self-pacing.  Pt. Fatigued this day.  RW and 3-in-1 commode already delivered to hospital room.  Recommend HH OT.  To benefit from continued acute care OT services to increase independence in self-care/functional transfers.  OT to follow.

## 2018-09-14 NOTE — PLAN OF CARE
DME - delivered 9/14/18 Room 352 [id number]  [employer] [place], EDWIN  Case Management  [phone]

## 2018-09-14 NOTE — PT/OT/SLP EVAL
Occupational Therapy   Evaluation and Treatment    Name: Thao Perera  MRN: [id number]  Admitting Diagnosis:  OA (osteoarthritis) of hip 1 Day Post-Op    Recommendations:     Discharge Recommendations: home health OT, home health PT  Discharge Equipment Recommendations:  other (see comments)(RW and 3-in-1 commode already delivered to hospital room)  Barriers to discharge:  None    History:     Occupational Profile:  Living Environment: Lives in [address] [place] [address] with elevator access; has access to tub/shower and walk-in shower (preferred use); standard toilet  Previous level of function: Independent with ADL, IADL, and driving.  Significant other cooks and cleans.    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  Equipment Used at Home:  none  Assistance upon Discharge: Significant other will be able to assist     Past Medical History:   Diagnosis Date    Arthritis        Past Surgical History:   Procedure Laterality Date    APPENDECTOMY      ARTHROPLASTY, HIP Right 2018    Performed by [facility] at [facility] OR    ARTHROSCOPY-SHOULDER Left 7/15/2016    Performed by [facility] at [facility] OR    CHOLECYSTECTOMY      DECOMPRESSION-SHOULDER-ARTHROSCOPIC Left 7/15/2016    Performed by [facility] at [facility] OR    EYE SURGERY Bilateral     IOL    HIP ARTHROPLASTY Right 2018    Procedure: ARTHROPLASTY, HIP;  Surgeon: Quincy Reyna MD;  Location: [facility] OR;  Service: Orthopedics;  Laterality: Right;    HYSTERECTOMY      JOINT REPLACEMENT Left     hip, bilateral knees    REPAIR-ROTATOR CUFF / MINI OPEN Left 7/15/2016    Performed by [facility] at [facility] OR    TONSILLECTOMY         Subjective     Chief Complaint: R-hip pain.  Patient/Family Comments/goals: Return home.     Pain/Comfort:  · Pain Rating 1: 9/10  · Location - Side 1: Right  · Location - Orientation 1: generalized  · Location 1: hip  · Pain Addressed 1: Reposition, Cessation of Activity, Distraction, Nurse  notified  · Pain Rating Post-Intervention 1: 6/10    Patients cultural, spiritual, Yarsanism conflicts given the current situation: None stated.     Objective:     Communicated with: Nursing prior to session.  Patient found all lines intact, call button in reach, nursing notified and family present and peripheral IV upon OT entry to room.    General Precautions: Standard, fall   Orthopedic Precautions:RLE weight bearing as tolerated, RLE anterior precautions   Braces: N/A     Occupational Performance:    Functional Mobility/Transfers:  · Patient completed Sit <> Stand Transfer with MOD A upon first attempt from bedside chair and CGA upon 2nd attempt from bedside chair; MIN A and CGA from [facility] over toilet; all with  rolling walker   · Patient completed Toilet Transfer Step Transfer technique with  minimum assistance with  rolling walker and bedside commode  · Functional Mobility: Ambulated short household distance bedside chair<>bathroom with RW, increased time, consistent/constant verbal cues for upright posture and proximity to RW    Activities of Daily Living:  · Bathing: moderate assistance for assist with BLE feet while seated at [facility] and back rachana region; Pt. unable to reach feet for task and also too fatigued for washing back rachana once in stance  · Upper Body Dressing: contact guard assistance in stance for pullover gown  · Lower Body Dressing: moderate assistance for completely pulling underwear to waist in stance; able to thread BLE seated EOB; increased time for doffing socks with reacher while seated at [facility] with instruction on reacher use    · Toileting: contact guard assistance for gown management in stance and front rachana hygiene in sitting    Cognitive/Visual Perceptual:  Cognitive/Psychosocial Skills:  -       Oriented to: Person, Place, Time and Situation   -       Follows Commands/attention:Easily distracted, Follows one-step commands and Follows two-step commands  -       Communication:  clear/fluent  -       Memory: No Deficits noted  -       Safety awareness/insight to disability: impaired   Visual/Perceptual:  -grossly intact      Physical Exam:  Postural examination/scapula alignment: -       Rounded shoulders  -       Forward head  -       Abnormal trunk flexion  Skin integrity: Visible skin intact and dressing to R-hip  Edema:  None noted  Sensation: -       Intact light touch to BUE; denies numbness/tingling  Dominant hand: -       Right  Upper Extremity Range of Motion:  -       Right Upper Extremity: WFL   -       Left Upper Extremity: WFL    Upper Extremity Strength: -       Right Upper Extremity: WFL  -       Left Upper Extremity: WFL    Strength: -       Right Upper Extremity: WFL   -       Left Upper Extremity: WFL   Fine Motor Coordination: -       Intact    AMPAC 6 Click ADL:  AMPAC Total Score: 17    Treatment & Education:  Educated on role of OT, POC, functional transfer/ADL safety, DME needs/use, and anterior hip precautions.    Education:    Patient left up in chair with all lines intact, call button in reach, nursing notified and family present    Assessment:   Initial OT eval/treat complete.  Requires verbal cues for safe hand placement for steadying and managing hospital gown in stance and able to clean front rachana region in stance.  [employer] bath at [facility] with assist for cleaning back rachana hygiene in stance and feet.  Donned pullover gown in stance with CGA with verbal instruction for performing task in sitting, however Pt. Did not want to sit and have to stand again.  Threaded BLE into underwear while seated in bedside chair and required assist for pulling underwear to waist while steadying BUE onto RW. Functional transfers ranged from MOD A to CGA with RW this day with increased verbal cues for RLE positioning and use of BUE at armrests to assist in controlled stance/descent.  Requires consistent/constant verbal cues for upright posture and proximity to RW while in stance and  "throughout household ambulation and standing ADL; also needs increased verbal cues for self-pacing.  Pt. Fatigued this day.  RW and 3-in-1 commode already delivered to hospital room.  Recommend HH OT.  To benefit from continued acute care OT services to increase independence in self-care/functional transfers.  OT to follow.     Thao Perera is a 72 y.o. female with a medical diagnosis of OA (osteoarthritis) of hip.  She presents with the following performance deficits affecting function: weakness, impaired self care skills, impaired endurance, impaired functional mobilty, gait instability, impaired balance, pain, decreased ROM, orthopedic precautions, impaired skin, decreased safety awareness, decreased lower extremity function.      Rehab Prognosis: Good; patient would benefit from acute skilled OT services to address these deficits and reach maximum level of function.         Clinical Decision Makin.  OT Mod:  "Pt evaluation falls under moderate complexity for evaluation coding due to identification of 3-5 performance deficits noted as stated above. Eval required Min/Mod assistance to complete on this date and detailed assessment(s) were utilized. Moreover, an expanded review of history and occupational profile obtained with additional review of cognitive, physical and psychosocial hx."     Plan:     Patient to be seen daily to address the above listed problems via self-care/home management, therapeutic activities, therapeutic exercises  · Plan of Care Expires: 18  · Plan of Care Reviewed with: patient, daughter, significant other, other (see comments)(son-in-law)    This Plan of care has been discussed with the patient who was involved in its development and understands and is in agreement with the identified goals and treatment plan    GOALS:   Multidisciplinary Problems     Occupational Therapy Goals        Problem: Occupational Therapy Goal    Goal Priority Disciplines Outcome Interventions "   Occupational Therapy Goal     OT, PT/OT Ongoing (interventions implemented as appropriate)    Description:  Goals to be met by: 9/21/2018     Patient will increase functional independence with ADLs by performing:    LE Dressing with Contact Guard Assistance with AE as needed.  Grooming while standing at sink with Supervision.  Toileting from bedside commode with Stand-by Assistance for hygiene and clothing management.   Bathing from  edge of bed with Stand-by Assistance.  Toilet transfer to bedside commode with Stand-by Assistance.                      Time Tracking:     OT Date of Treatment: 09/14/18  OT Start Time: 1313  OT Stop Time: 1431  OT Total Time (min): 78 min    Billable Minutes:Evaluation 15  Self Care/Home Management 63    Lainey Montes, PERLA  9/14/2018

## 2018-09-14 NOTE — DISCHARGE SUMMARY
[facility]  Discharge Summary      Admit Date: 9/13/2018    Discharge Date and Time: No discharge date for patient encounter.    Attending Physician: Quincy Reyna MD     Reason for Admission:  Osteoarthritis right hip    Procedures Performed: Procedure(s) (LRB):  ARTHROPLASTY, HIP (Right)    Hospital Course (synopsis of major diagnoses, care, treatment, and services provided during the course of the hospital stay): The above patient underwent the above procedure.  Post operative the patient received pain management and pt / ot. The patient progressed well and will be discharged--see orders.  Some difficulties with pain management and medications     Consults: nephrology    Significant Diagnostic Studies: Labs:   CBC   Recent Labs   Lab  09/14/18   0432   WBC  9.87   HGB  10.2*   HCT  32.4*   PLT  193       Final Diagnoses:    Principal Problem: OA (osteoarthritis) of hip   Secondary Diagnoses:   Active Hospital Problems    Diagnosis  POA    *OA (osteoarthritis) of hip [M16.9]  Yes      Resolved Hospital Problems    Diagnosis Date Resolved POA    OA (osteoarthritis) of hip [M16.9] 09/14/2018 Yes    Primary osteoarthritis of right hip [M16.11] 09/13/2018 Yes       Discharged Condition: good    Disposition: Home-Health Care [facility]    Follow Up/Patient Instructions:     Medications:  Reconciled Home Medications:      Medication List      START taking these medications    aspirin 81 MG Chew  Take 1 tablet (81 mg total) by mouth 2 (two) times daily.     ondansetron 8 MG Tbdl  Commonly known as:  ZOFRAN-ODT  Take 1 tablet (8 mg total) by mouth every 8 (eight) hours as needed.        CONTINUE taking these medications    DUEXIS 800-26.6 mg Tab  Generic drug:  ibuprofen-famotidine  Take 1 tablet by mouth 3 (three) times daily.     fluconazole 150 MG Tab  Commonly known as:  DIFLUCAN  Take 150 mg by mouth once daily. For 3 days     LYRICA ORAL  Take 1 tablet by mouth.     OCUVITE ORAL  Take 2 tablets  "by mouth once daily.          Discharge Procedure Orders   WALKER FOR HOME USE     Order Specific Question Answer Comments   Type of Walker: Adult (5'4"-6'6")    With wheels? Yes    Height: 5' 7.5" (1.715 m)    Weight: 99.8 kg (220 lb)    Length of need (1-99 months): 99    Please check all that apply: Patient's condition impairs ambulation.    Please check all that apply: Patient is unable to safely ambulate without equipment.    Please check all that apply: Patient needs help to get in and out of chair.    Please check all that apply: Altered sensory perception.    Please check all that apply: Walker will be used for gait training.      COMMODE FOR HOME USE     Order Specific Question Answer Comments   Type: Standard    Height: 5' 7.5" (1.715 m)    Weight: 99.8 kg (220 lb)    Length of need (1-99 months): 99      Follow-up Information     Please follow up.    Why:  AS SCHEDULED , Call [phone] to reach Dr. Reyna               "

## 2018-09-14 NOTE — PLAN OF CARE
Problem: Patient Care Overview  Goal: Plan of Care Review  Outcome: Ongoing (interventions implemented as appropriate)  Patient is s/p Right hip ORIF POD#1. AAOx4, on RA, VSS. Afebrile; scheduled antibiotics given. Pain managed effectively with prn Morphine 4 mg IVP x1 and prn Ibuprofen 400mg PO x1. Patient has remained in bed sleeping during this shift.

## 2018-09-14 NOTE — PLAN OF CARE
Problem: Patient Care Overview  Goal: Plan of Care Review  Outcome: Ongoing (interventions implemented as appropriate)  AAOX4.VSS. Pt free of trauma, falls, injury and skin breakdown. Pt pain moderately controlled with Ibuprofen analgesics.Pt tolerating regular diet. Denies N/V.Pt bilateral lower extremities pulses intact. Dressing to R hip CDI. No BM but passing flatus. Fluids/IS encouraged throughout shift. Pt encouraged to reposition self frequently while in bed. PT/OT this shift (see notes). Purposeful hourly rounding.Pt has call light in reach, side rails up X2, bed in low position, TEDs/SCDs and nonskid socks on.Pt up in recliner, wheels locked in no distress with spouse at the bedside.

## 2018-09-14 NOTE — PLAN OF CARE
Problem: Physical Therapy Goal  Goal: Physical Therapy Goal  Goals to be met by 9-16-18.  1. Sup<>sit mod I  2. Sit<>stand with RW mod I  3. amb 150' with RW mod I   Outcome: Ongoing (interventions implemented as appropriate)    Patient complained of increase pain with mobility, increase time to perform task, required CGA/Min A for safety throughout. Patient was limited secondary to nausea and vomiting.

## 2018-09-15 VITALS
RESPIRATION RATE: 18 BRPM | SYSTOLIC BLOOD PRESSURE: 122 MMHG | HEIGHT: 68 IN | BODY MASS INDEX: 33.34 KG/M2 | WEIGHT: 220 LBS | HEART RATE: 62 BPM | OXYGEN SATURATION: 95 % | DIASTOLIC BLOOD PRESSURE: 55 MMHG | TEMPERATURE: 98 F

## 2018-09-15 LAB
ANION GAP SERPL CALC-SCNC: 7 MMOL/L
BASOPHILS # BLD AUTO: 0.02 K/UL
BASOPHILS NFR BLD: 0.2 %
BUN SERPL-MCNC: 9 MG/DL
CALCIUM SERPL-MCNC: 9.7 MG/DL
CHLORIDE SERPL-SCNC: 109 MMOL/L
CO2 SERPL-SCNC: 27 MMOL/L
CREAT SERPL-MCNC: 0.9 MG/DL
DIFFERENTIAL METHOD: ABNORMAL
EOSINOPHIL # BLD AUTO: 0.5 K/UL
EOSINOPHIL NFR BLD: 4.9 %
ERYTHROCYTE [DISTWIDTH] IN BLOOD BY AUTOMATED COUNT: 15.2 %
EST. GFR  (AFRICAN AMERICAN): >60 ML/MIN/1.73 M^2
EST. GFR  (NON AFRICAN AMERICAN): >60 ML/MIN/1.73 M^2
GLUCOSE SERPL-MCNC: 108 MG/DL
HCT VFR BLD AUTO: 31.6 %
HGB BLD-MCNC: 9.9 G/DL
LYMPHOCYTES # BLD AUTO: 1.9 K/UL
LYMPHOCYTES NFR BLD: 17.7 %
MCH RBC QN AUTO: 25.3 PG
MCHC RBC AUTO-ENTMCNC: 31.3 G/DL
MCV RBC AUTO: 81 FL
MONOCYTES # BLD AUTO: 1.4 K/UL
MONOCYTES NFR BLD: 13.1 %
NEUTROPHILS # BLD AUTO: 6.7 K/UL
NEUTROPHILS NFR BLD: 63.8 %
PLATELET # BLD AUTO: 190 K/UL
PMV BLD AUTO: 10.4 FL
POTASSIUM SERPL-SCNC: 3.9 MMOL/L
RBC # BLD AUTO: 3.92 M/UL
SODIUM SERPL-SCNC: 143 MMOL/L
WBC # BLD AUTO: 10.53 K/UL

## 2018-09-15 PROCEDURE — G8978 MOBILITY CURRENT STATUS: HCPCS | Mod: CK

## 2018-09-15 PROCEDURE — 97116 GAIT TRAINING THERAPY: CPT

## 2018-09-15 PROCEDURE — 94799 UNLISTED PULMONARY SVC/PX: CPT

## 2018-09-15 PROCEDURE — 97110 THERAPEUTIC EXERCISES: CPT

## 2018-09-15 PROCEDURE — 80048 BASIC METABOLIC PNL TOTAL CA: CPT

## 2018-09-15 PROCEDURE — G8980 MOBILITY D/C STATUS: HCPCS | Mod: CK

## 2018-09-15 PROCEDURE — G8979 MOBILITY GOAL STATUS: HCPCS | Mod: CI

## 2018-09-15 PROCEDURE — 25000003 PHARM REV CODE 250: Performed by: ORTHOPAEDIC SURGERY

## 2018-09-15 PROCEDURE — 25000003 PHARM REV CODE 250: Performed by: INTERNAL MEDICINE

## 2018-09-15 PROCEDURE — 85025 COMPLETE CBC W/AUTO DIFF WBC: CPT

## 2018-09-15 PROCEDURE — 94761 N-INVAS EAR/PLS OXIMETRY MLT: CPT

## 2018-09-15 PROCEDURE — 97530 THERAPEUTIC ACTIVITIES: CPT

## 2018-09-15 PROCEDURE — 97535 SELF CARE MNGMENT TRAINING: CPT

## 2018-09-15 PROCEDURE — 36415 COLL VENOUS BLD VENIPUNCTURE: CPT

## 2018-09-15 RX ADMIN — POLYETHYLENE GLYCOL 3350 17 G: 17 POWDER, FOR SOLUTION ORAL at 09:09

## 2018-09-15 RX ADMIN — IBUPROFEN 400 MG: 400 TABLET ORAL at 04:09

## 2018-09-15 RX ADMIN — ASPIRIN 81 MG CHEWABLE TABLET 81 MG: 81 TABLET CHEWABLE at 09:09

## 2018-09-15 RX ADMIN — PREGABALIN 75 MG: 75 CAPSULE ORAL at 09:09

## 2018-09-15 RX ADMIN — FAMOTIDINE 20 MG: 20 TABLET ORAL at 09:09

## 2018-09-15 RX ADMIN — IBUPROFEN 800 MG: 400 TABLET ORAL at 09:09

## 2018-09-15 NOTE — PROGRESS NOTES
"IM  Progress Note    Admit Date: 9/13/2018   LOS: 2 days     SUBJECTIVE:     Follow-up For:  OA (osteoarthritis) of hip    Interval History:    POD #2 right hip repair.  Stayed overnight for pain control.  Feels better today.  Tolerating po, voiding, passing flatus.   at the bedside.    Review of Systems:  Constitutional: No fever or chills  Respiratory: No cough or shortness of breath  Cardiovascular: No chest pain or palpitations  Gastrointestinal: No nausea or vomiting  Neurological: No confusion or weakness    OBJECTIVE:     Vital Signs Range (Last 24H):  BP (!) 122/59 (BP Location: Right arm, Patient Position: Lying)   Pulse 60   Temp 98.4 °F (36.9 °C) (Oral)   Resp 18   Ht 5' 7.5" (1.715 m)   Wt 99.8 kg (220 lb)   SpO2 97%   Breastfeeding? No   BMI 33.95 kg/m²     Temp:  [97.6 °F (36.4 °C)-98.7 °F (37.1 °C)]   Pulse:  [60-70]   Resp:  [18-20]   BP: (122-140)/(58-62)   SpO2:  [95 %-98 %]     I & O (Last 24H):    Intake/Output Summary (Last 24 hours) at 9/15/2018 0745  Last data filed at 9/15/2018 0400  Gross per 24 hour   Intake 720 ml   Output 2150 ml   Net -1430 ml       Physical Exam:  General appearance: Well developed, well nourished  Eyes:  Conjunctivae/corneas clear. PERRL.  Lungs: Normal respiratory effort,   clear to auscultation bilaterally   Heart: Regular rate and rhythm, S1, S2 normal, no murmur, rub or gautam.  Abdomen: Soft, non-tender non-distended; bowel sounds normal; no masses,  no organomegaly  Extremities: No cyanosis or clubbing. No edema.  +2 pulses BLE, abductor pillow in place  Skin: Skin color, texture, turgor normal. No rashes or lesions, dressing right hip CDI  Neurologic: Normal strength and tone. No focal numbness or weakness     Laboratory Data:  Recent Labs   Lab  09/15/18   0459   WBC  10.53   RBC  3.92*   HGB  9.9*   HCT  31.6*   PLT  190   MCV  81*   MCH  25.3*   MCHC  31.3*       BMP:   Recent Labs   Lab  09/15/18   0459   GLU  108   NA  143   K  3.9   CL  109 "   CO2  27   BUN  9   CREATININE  0.9   CALCIUM  9.7     Lab Results   Component Value Date    CALCIUM 9.7 09/15/2018       Medications:  Medication list was reviewed and changes noted under Assessment/Plan.    Diagnostic Results:    Reviewed    ASSESSMENT/PLAN:     1. Right hip repair (M16.11): POD #2 per therapy and ortho teams  2. Microcytosis/Anemia (R71.8/D62): noted again today. Patient asymptomatic.  3. CKD 3 (N18.3): noted on pre-op labs. Monitor. Renally dose meds, avoid nephrotoxins, and monitor I/O's closely.  Careful with NSAIDs use post-op.  Encouraged to only take NSAIDs with meals.  4. DVT prophy: ASA 81 mg BID, SOL and SCD per ortho       Dispo: Medically stable for discharge    Ethan Sanches MD  Nephrology

## 2018-09-15 NOTE — PLAN OF CARE
Problem: Patient Care Overview  Goal: Plan of Care Review  Outcome: Ongoing (interventions implemented as appropriate)  Patient is s/p Right hip ORIF POD#2. AAOx4, on RA, VSS, afebrile. Pain managed effectively with scheduled Ibuprofen 800mg PO and prn Ibuprofen 400mg PO x1. Up to BSC with 1-person assistance.

## 2018-09-15 NOTE — PT/OT/SLP PROGRESS
Physical Therapy Treatment and Discharge Summary    Patient Name:  Thao Perera   MRN:  [id number]    Recommendations:     Discharge Recommendations:  home, home health OT, home health PT   Discharge Equipment Recommendations: walker, rolling, hip kit, 3-in-1 commode   Barriers to discharge: None    Assessment:     Thao Perera is a 72 y.o. female admitted with a medical diagnosis of OA (osteoarthritis) of hip.  She presents with the following impairments/functional limitations:  weakness, impaired endurance, decreased coordination, decreased lower extremity function, gait instability, impaired functional mobilty.  Pt greatly improved with functional mobility but still needing assistance/supervsion from family.  Family given instruction in assistance/supervision.  Pt now d/c home with  PT.      Rehab Prognosis:  excellent; patient would benefit from acute skilled PT services to address these deficits and reach maximum level of function.      Recent Surgery: Procedure(s) (LRB):  ARTHROPLASTY, HIP (Right) 2 Days Post-Op    Plan:     During this hospitalization, patient to be seen daily to address the above listed problems via therapeutic activities, gait training, therapeutic exercises  · Plan of Care Expires:  10/13/18   Plan of Care Reviewed with: patient, spouse    Subjective     Communicated with patient, sig other, daughter prior to session.  Patient found in bed supine upon PT entry to room, agreeable to treatment.      Chief Complaint: R hip pain  Patient comments/goals: I'm going home  Pain/Comfort:  · Pain Rating 1: 6/10  · Location - Side 1: Right  · Location 1: hip  · Pain Addressed 1: Pre-medicate for activity, Distraction  · Pain Rating Post-Intervention 1: 8/10    Patients cultural, spiritual, Orthodox conflicts given the current situation: none mentioned    Objective:     Patient found with: peripheral IV     General Precautions: Standard, fall   Orthopedic Precautions:RLE weight bearing as  tolerated, RLE anterior precautions   Braces: N/A     Functional Mobility:  · Bed Mobility:     · Supine to Sit: minimum assistance  · Transfers:     · Sit to Stand:  contact guard assistance with rolling walker  · Gait: amb 150' wiht RW and supervision v/c to improve posture and gait  · Balance: good standing dynamic balance      AM-PAC 6 CLICK MOBILITY  Turning over in bed (including adjusting bedclothes, sheets and blankets)?: 3  Sitting down on and standing up from a chair with arms (e.g., wheelchair, bedside commode, etc.): 3  Moving from lying on back to sitting on the side of the bed?: 3  Moving to and from a bed to a chair (including a wheelchair)?: 3  Need to walk in hospital room?: 3  Climbing 3-5 steps with a railing?: 3  Basic Mobility Total Score: 18       Therapeutic Activities and Exercises:   in bed supine: ap x 20; slr with assist x 15; qs x 10; gs x 5    Patient left up in chair with all lines intact, call button in reach, bed alarm on, nurse notified and sig other and daughter present..    GOALS:   Multidisciplinary Problems     Physical Therapy Goals        Problem: Physical Therapy Goal    Goal Priority Disciplines Outcome Goal Variances Interventions   Physical Therapy Goal     PT, PT/OT Ongoing (interventions implemented as appropriate)     Description:  Goals to be met by 9-16-18.  1. Sup<>sit mod I  2. Sit<>stand with RW mod I  3. amb 150' with RW mod I                    Time Tracking:     PT Received On: 09/15/18  PT Start Time: 1037     PT Stop Time: 1123  PT Total Time (min): 46 min     Billable Minutes: Gait Training 16, Therapeutic Activity 18 and Therapeutic Exercise 15    Treatment Type: Treatment  PT/PTA: PT     PTA Visit Number: 1     Alli Toth, PT  09/15/2018

## 2018-09-15 NOTE — PLAN OF CARE
Problem: Occupational Therapy Goal  Goal: Occupational Therapy Goal  Goals to be met by: 9/21/2018     Patient will increase functional independence with ADLs by performing:    LE Dressing with Contact Guard Assistance with AE as needed.NOT MET  Grooming while standing at sink with Supervision. NOT MET  Toileting from bedside commode with Stand-by Assistance for hygiene and clothing management. NOT MET  Bathing from  edge of bed with Stand-by Assistance. NOT MET  Toilet transfer to bedside commode with Stand-by Assistance.NOT MET   Outcome: Ongoing (interventions implemented as appropriate)  The pt continues to have difficulty sit>< stand (MOD A), ambulating 30' and 12' with effort and difficulty lifting RLE from floor to step (Min A).  She was Min A toilet transfer, SBA G/H seated in chair, Min A UBD and Mod A LBD with use of AE.  Home safety and mobility recommendations provided.  AMANDA Miller 9/15/2018

## 2018-09-15 NOTE — NURSING
Pt free of trauma, falls, and injury. Pt VSS and afebrile throughout shift. Pt neurovascular checks are intact. Pt dressing is clean, dry, and intact on right hip. Pt pain has been controlled by mild PO pain meds and tolerated well. Pt has ambulated with PT and RW in no distress. Pt has been eating and voiding adequately throughout shift. Pt has call light in reach, bed alarm on, bed brakes on, side rails up x2, bed in low position, TEDs/SCDs on, IS at bedside, and nonskid socks on. Pt given discharge instructions and verbalized understanding. No IV present to remove on discharge. Pt escorted by transport in wheelchair with belongings, hip kit, and walker for discharge.

## 2018-09-15 NOTE — PLAN OF CARE
Problem: Physical Therapy Goal  Goal: Physical Therapy Goal  Goals to be met by 9-16-18.  1. Sup<>sit mod I  2. Sit<>stand with RW mod I  3. amb 150' with RW mod I   -    Comments: Pt greatly improved with functional mobility but still needing assistance/supervsion from family.  Family given instruction in assistance/supervision.  Pt now d/c home with  PT.

## 2018-09-15 NOTE — PROGRESS NOTES
Patient up in chair on room air saturations 95% with no reported distress.IS done with good effort.

## 2018-09-15 NOTE — PT/OT/SLP PROGRESS
"Occupational Therapy   Treatment    Name: Thao Perera  MRN: [id number]  Admitting Diagnosis:  OA (osteoarthritis) of hip  2 Days Post-Op, s/p Right CASANDRA    Recommendations:     Discharge Recommendations: home health PT, home health OT  Discharge Equipment Recommendations:  hip kit, 3-in-1 commode, walker, joann(short shoe horn)  Barriers to discharge:  None    Subjective     Communicated with: patient, her daughter, and nurse prior to session.  She was agreeable to OT treatment.  Pain/Comfort:  · Pain Rating 1: 8/10  · Location - Side 1: Right  · Location - Orientation 1: generalized  · Location 1: hip  · Pain Addressed 1: Pre-medicate for activity, Reposition, Cessation of Activity  · Pain Rating Post-Intervention 1: 5/10    Patients cultural, spiritual, Christian conflicts given the current situation: none    Objective:     Patient found with: peripheral IV    General Precautions: Standard, fall   Orthopedic Precautions:RLE weight bearing as tolerated, RLE anterior precautions   Braces: N/A     Occupational Performance:    Bed Mobility:    · Mod A supine>sit EOB (instruction for procedure and hip precautions)     Functional Mobility/Transfers:        Mod A sit>stand/Min A stand<sit with RW        Min A toilet transfer with RW        Min A chair transfer with RW    She ambulated bed>bathroom and then 12" toilet>chair with RW Min A (high effort, fatigue, and difficulty lifting RLE off the floor to step.  Pt's daughter instruction on how to assist with transfers and ambulation safety.    Activities of Daily Living:  · MI self-feeding seated in chair  · SBA G/H (wash hands) seated in chair  · Min A UBD (doff night gown, don bra and dress) seated on toilet with daughter adjusting dress when standing with RW  · Mod A LBD (doff socks, and don shoes and briefs) seated and standing with RW at toilet-instruction for use of AE, hip precautions and safety with pt and her daughter- daughter pulled up briefs with pt standing " with RW  · Mod A toilet hygiene (Total A clothing management, SBA rachana-care) seated on toilet    Patient left up in chair with call button in reach, nurse notified and  patient's daughter present    AM PAC 6 Click:  AM PAC Total Score: 17    Treatment & Education:  Use of AE for LBD (caregiver assist for LBD without AE also discussed), home safety and assist with mobility and self-care at home discussed, hip precautions, bed mobility, activity recommendations and precautions discussed  Education:    Assessment:     Thao Perera is a 72 y.o. female with a medical diagnosis of OA (osteoarthritis) of hip, s/p Right CASANDRA.  She presents with .  Performance deficits affecting function are gait instability, impaired endurance, impaired functional mobilty, weakness, impaired self care skills, impaired balance, decreased lower extremity function, decreased safety awareness, pain, decreased upper extremity function, orthopedic precautions, decreased ROM. She  continues to have difficulty sit>< stand (MOD A), ambulating 30' and 12' with effort and difficulty lifting RLE from floor to step (Min A).  She was Min A toilet transfer, SBA G/H seated in chair, Min A UBD and Mod A LBD with use of AE.  Home safety and mobility recommendations provided. Continuation of OT treatment needed to maximize function while in the hospital.    Rehab Prognosis:  good; patient would benefit from acute skilled OT services to address these deficits and reach maximum level of function.       Plan:     Patient to be seen daily to address the above listed problems via self-care/home management, therapeutic activities, therapeutic exercises  · Plan of Care Expires: 09/21/18  · Plan of Care Reviewed with: patient, spouse    This Plan of care has been discussed with the patient who was involved in its development and understands and is in agreement with the identified goals and treatment plan    GOALS:   Multidisciplinary Problems     Occupational  Therapy Goals        Problem: Occupational Therapy Goal    Goal Priority Disciplines Outcome Interventions   Occupational Therapy Goal     OT, PT/OT Ongoing (interventions implemented as appropriate)    Description:  Goals to be met by: 9/21/2018     Patient will increase functional independence with ADLs by performing:    LE Dressing with Contact Guard Assistance with AE as needed.NOT MET  Grooming while standing at sink with Supervision. NOT MET  Toileting from bedside commode with Stand-by Assistance for hygiene and clothing management. NOT MET  Bathing from  edge of bed with Stand-by Assistance. NOT MET  Toilet transfer to bedside commode with Stand-by Assistance.NOT MET                     Time Tracking:     OT Date of Treatment: 09/15/18  OT Start Time: 0755  OT Stop Time: 0853  OT Total Time (min): 58 min    Billable Minutes:Self Care/Home Management 58    AMANDA Miller  9/15/2018

## 2018-09-15 NOTE — PROGRESS NOTES
Postop day 2.  Right hip replacement.  Less nausea, improved.  Difficulty with pain medication. Walking with walker, no new compaints. No s/s dvt. .  Plan discharge today.  F/U with dr gonzalez.

## 2018-09-16 NOTE — PT/OT/SLP DISCHARGE
Occupational Therapy Discharge Summary    Thao Perera  MRN: [id number]   Principal Problem: OA (osteoarthritis) of hip      Patient Discharged from acute Occupational Therapy on 9/15/18.  Please refer to prior OT note dated 9/15/18 for functional status.    Assessment:      Patient appropriate for care in another setting.    Objective:     GOALS:   Multidisciplinary Problems     Occupational Therapy Goals        Problem: Occupational Therapy Goal    Goal Priority Disciplines Outcome Interventions   Occupational Therapy Goal     OT, PT/OT Ongoing (interventions implemented as appropriate)    Description:  Goals to be met by: 9/21/2018     Patient will increase functional independence with ADLs by performing:    LE Dressing with Contact Guard Assistance with AE as needed.NOT MET  Grooming while standing at sink with Supervision. NOT MET  Toileting from bedside commode with Stand-by Assistance for hygiene and clothing management. NOT MET  Bathing from  edge of bed with Stand-by Assistance. NOT MET  Toilet transfer to bedside commode with Stand-by Assistance.NOT MET                     Reasons for Discontinuation of Therapy Services  Transfer to alternate level of care.      Plan:     Patient Discharged to: Home with Home Health Service    AMANDA Miller  9/16/2018

## 2018-10-10 ENCOUNTER — OFFICE VISIT (OUTPATIENT)
Dept: ORTHOPEDICS | Facility: CLINIC | Age: 73
End: 2018-10-10
Payer: COMMERCIAL

## 2018-10-10 VITALS
DIASTOLIC BLOOD PRESSURE: 90 MMHG | HEIGHT: 67 IN | SYSTOLIC BLOOD PRESSURE: 140 MMHG | WEIGHT: 205 LBS | BODY MASS INDEX: 32.18 KG/M2

## 2018-10-10 DIAGNOSIS — M48.02 CERVICAL STENOSIS OF SPINE: Primary | ICD-10-CM

## 2018-10-10 DIAGNOSIS — M51.36 DISC DEGENERATION, LUMBAR: ICD-10-CM

## 2018-10-10 DIAGNOSIS — M43.16 SPONDYLOLISTHESIS AT L4-L5 LEVEL: ICD-10-CM

## 2018-10-10 DIAGNOSIS — G56.03 CARPAL TUNNEL SYNDROME ON BOTH SIDES: ICD-10-CM

## 2018-10-10 DIAGNOSIS — M48.062 LUMBAR STENOSIS WITH NEUROGENIC CLAUDICATION: ICD-10-CM

## 2018-10-10 DIAGNOSIS — M47.816 LUMBAR SPONDYLOSIS: ICD-10-CM

## 2018-10-10 DIAGNOSIS — M51.16 LUMBAR DISC HERNIATION WITH RADICULOPATHY: ICD-10-CM

## 2018-10-10 DIAGNOSIS — Z96.649 HISTORY OF TOTAL HIP REPLACEMENT, UNSPECIFIED LATERALITY: ICD-10-CM

## 2018-10-10 PROBLEM — M51.369 DISC DEGENERATION, LUMBAR: Status: ACTIVE | Noted: 2018-10-10

## 2018-10-10 PROCEDURE — 1101F PT FALLS ASSESS-DOCD LE1/YR: CPT | Mod: ,,, | Performed by: ORTHOPAEDIC SURGERY

## 2018-10-10 PROCEDURE — 1111F DSCHRG MED/CURRENT MED MERGE: CPT | Mod: ,,, | Performed by: ORTHOPAEDIC SURGERY

## 2018-10-10 PROCEDURE — 99203 OFFICE O/P NEW LOW 30 MIN: CPT | Mod: ,,, | Performed by: ORTHOPAEDIC SURGERY

## 2018-10-10 RX ORDER — ACETAMINOPHEN AND CODEINE PHOSPHATE 300; 30 MG/1; MG/1
TABLET ORAL
COMMUNITY
End: 2019-05-08

## 2018-10-10 RX ORDER — TRAMADOL HYDROCHLORIDE 50 MG/1
TABLET ORAL
COMMUNITY
Start: 2018-08-17 | End: 2018-10-10

## 2018-10-10 NOTE — PROGRESS NOTES
Subjective:       Patient ID: Thao Perera is a 72 y.o. female.    Chief Complaint: Pain of the Neck (Neck pain x 5 years radiates down both arms to fingers with numbness/ tingling. She does get headaches. ) and Pain of the Lumbar Spine (Lumbar pain x 10 years that radiates down both legs to feet with tingling. Limited sitting, standing and walking. She has no control over her urine. She also has constipation. She just had right hip replacement 9-13-18 by Dr Reyna)      History of Present Illness  Insidious copy dated history. She's had bilateral knee replacements and bilateral hip replacements performed elsewhere most recent a right total hip about a month ago. She has a long history of chronic neck and low back pain she has a history of carpal tunnel syndromes bilaterally. She's had MRIs both done of her neck and back but is not sure of the results. She sometimes has problems with constipation and sometimes problems with bowel incontinence she has no saddle anesthesia. All symptoms have improved in for many years. Her neck actually hurts more than her lower back. Leg pains are really in her hips and down her thighs but not below level of her knees though she has some chronic numbness in both feet. She has seen several different pain management doctors in the past. She was advised to have a rhizotomy of the lumbar spine but did not proceed with such a recommendation in the past performed elsewhere    Current Medications  Current Outpatient Medications   Medication Sig Dispense Refill    acetaminophen-codeine 300-30mg (TYLENOL #3) 300-30 mg Tab Take by mouth.       No current facility-administered medications for this visit.      Facility-Administered Medications Ordered in Other Visits   Medication Dose Route Frequency Provider Last Rate Last Dose    sodium chloride 0.9% flush 3 mL  3 mL Intravenous PRN Quincy Reyna MD           Allergies  Review of patient's allergies indicates:   Allergen Reactions     Percocet [oxycodone-acetaminophen] Other (See Comments)     Hallucinations  Can take Ultram, Tylenol #3 and Norco    Vicodin [hydrocodone-acetaminophen] Other (See Comments)     Hallucinations    Codeine        Past Medical History  Past Medical History:   Diagnosis Date    Arthritis        Surgical History  Past Surgical History:   Procedure Laterality Date    APPENDECTOMY      ARTHROPLASTY, HIP Right 9/13/2018    [facility] at [facility] OR    ARTHROSCOPY-SHOULDER Left 7/15/2016    [facility] at [facility]    CHOLECYSTECTOMY      DECOMPRESSION-SHOULDER-ARTHROSCOPIC Left 7/15/2016    [facility]    EYE SURGERY Bilateral     IOL    HIP ARTHROPLASTY Right 9/13/2018    Procedure: ARTHROPLASTY, HIP;  Surgeon: Quincy Reyna MD;  Location: [facility] OR;  Service: Orthopedics;  Laterality: Right;    HYSTERECTOMY      JOINT REPLACEMENT Left     hip, bilateral knees    REPAIR-ROTATOR CUFF / MINI OPEN Left 7/15/2016    [facility] at [facility] OR    TONSILLECTOMY         Family History:   History reviewed. No pertinent family history.    Social History:   Social History     Socioeconomic History    Marital status: Single     Spouse name: Not on file    Number of children: Not on file    Years of education: Not on file    Highest education level: Not on file   Social Needs    Financial resource strain: Not on file    Food insecurity - worry: Not on file    Food insecurity - inability: Not on file    Transportation needs - medical: Not on file    Transportation needs - non-medical: Not on file   Occupational History    Not on file   Tobacco Use    Smoking status: Never Smoker    Smokeless tobacco: Never Used   Substance and Sexual Activity    Alcohol use: Yes     Alcohol/week: 0.6 oz     Types: 1 Glasses of wine per week     Comment: occasionally    Drug use: No    Sexual activity: Not on file   Other  Topics Concern    Not on file   Social History Narrative    Not on file       Hospitalization/Major Diagnostic Procedure:     Review of Systems     General/Constitutional:  Chills denies. Fatigue denies. Fever denies. Weight gain denies. Weight loss denies.    Respiratory:  Shortness of breath denies.    Cardiovascular:  Chest pain denies.    Gastrointestinal:  Constipation denies. Diarrhea denies. Nausea denies. Vomiting denies.     Hematology:  Easy bruising denies. Prolonged bleeding denies.     Genitourinary:  Frequent urination denies. Pain in lower back denies. Painful urination denies.     Musculoskeletal:  See HPI for details    Skin:  Rash denies.    Neurologic:  Dizziness denies. Gait abnormalities denies. Seizures denies. Tingling/Numbess denies.    Psychiatric:  Anxiety denies. Depressed mood denies.     Objective:   Vital Signs:   Vitals:    10/10/18 0949   BP: (!) 140/90        Physical Exam      General Examination:     Constitutional: The patient is alert and oriented to lace person and time. Mood is pleasant.     Head/Face: Normal facial features normal eyebrows    Eyes: Normal extraocular motion bilaterally    Lungs: Respirations are equal and unlabored    Gait is coordinated.    Cardiovascular: There are no swelling or varicosities present.    Lymphatic: Negative for adenopathy    Skin: Normal    Neurological: Level of consciousness normal. Oriented to place person and time and situation    Psychiatric: Oriented to time place person and situation    Patient alert and oriented. Noted to walk with a rolling walker. Moderate tenderness right hip due to the right hip incision. Moderate tenderness both left and right posterior iliac spines. Pain with standing erect. No spasm but diffuse lumbosacral tenderness. Range of motion markedly restricted. Straight leg raising maneuvers cause back pain. Cervical exam shows tenderness both trapezius muscles left greater than right. Cervical range of motion  moderately restricted. Spurling's maneuver causes neck pain. Phalen's test positive bilaterally. Pulses intact.    XRAY Report/ Interpretation : MRI study lumbar spine report was reviewed. Multilevel disc herniations noted L2-3 L3-4. Spondylolisthesis L4-5 with stenosis.      Assessment:       1. Cervical stenosis of spine    2. History of total hip replacement, unspecified laterality    3. Lumbar stenosis with neurogenic claudication    4. Disc degeneration, lumbar    5. Lumbar spondylosis    6. Lumbar disc herniation with radiculopathy    7. Carpal tunnel syndrome on both sides    8. Spondylolisthesis at L4-L5 level        Plan:       Thao was seen today for pain and pain.    Diagnoses and all orders for this visit:    Cervical stenosis of spine  -     Ambulatory Referral to Pain Clinic    History of total hip replacement, unspecified laterality    Lumbar stenosis with neurogenic claudication  -     Ambulatory Referral to Pain Clinic    Disc degeneration, lumbar    Lumbar spondylosis    Lumbar disc herniation with radiculopathy    Carpal tunnel syndrome on both sides    Spondylolisthesis at L4-L5 level         Follow-up in about 6 weeks (around 11/21/2018).    Patient referred to pain management doctor to see if the treatment options that can be offered to her. She still recovering from her right hip replacement. I've explained that her hip and leg symptoms may be a combination of back problems as well as extremity and joint problems. She may need a cervical epidural steroid injection.. Cervical and lumbar x-rays next visit if not improved then we will order cervical MRI    This note was created using Dragon voice recognition software that occasionally misinterpreted phrases or words.

## 2018-10-31 ENCOUNTER — TELEPHONE (OUTPATIENT)
Dept: ORTHOPEDICS | Facility: CLINIC | Age: 73
End: 2018-10-31

## 2018-10-31 DIAGNOSIS — M47.816 OSTEOARTHRITIS OF LUMBAR SPINE, UNSPECIFIED SPINAL OSTEOARTHRITIS COMPLICATION STATUS: Primary | ICD-10-CM

## 2018-10-31 DIAGNOSIS — M48.02 SPINAL STENOSIS IN CERVICAL REGION: ICD-10-CM

## 2018-11-05 ENCOUNTER — TELEPHONE (OUTPATIENT)
Dept: ORTHOPEDICS | Facility: CLINIC | Age: 73
End: 2018-11-05

## 2018-11-05 NOTE — TELEPHONE ENCOUNTER
Will re-send orders.    ----- Message from Mirna Rosa sent at 11/5/2018 10:36 AM CST -----  Contact: Pt called [phone]  Please send orders to Dr. Kincaid office. Cx appt for today

## 2018-11-15 ENCOUNTER — TELEPHONE (OUTPATIENT)
Dept: ORTHOPEDICS | Facility: CLINIC | Age: 73
End: 2018-11-15

## 2018-11-15 NOTE — TELEPHONE ENCOUNTER
Returned the patients call. Just rings and rings. Unable to leave a voice mail.     ----- Message from Janell Salas sent at 11/14/2018  2:44 PM CST -----  Patient wants to know does dr paul want her to follow up with him before she sees dr blakely or after pts# [phone]

## 2018-12-03 RX ORDER — PREGABALIN 75 MG/1
CAPSULE ORAL
COMMUNITY
Start: 2018-10-29 | End: 2020-10-21

## 2019-03-27 ENCOUNTER — OFFICE VISIT (OUTPATIENT)
Dept: ORTHOPEDICS | Facility: CLINIC | Age: 74
End: 2019-03-27
Payer: COMMERCIAL

## 2019-03-27 VITALS
WEIGHT: 205 LBS | BODY MASS INDEX: 32.18 KG/M2 | SYSTOLIC BLOOD PRESSURE: 122 MMHG | DIASTOLIC BLOOD PRESSURE: 70 MMHG | HEIGHT: 67 IN | HEART RATE: 56 BPM

## 2019-03-27 DIAGNOSIS — M43.16 SPONDYLOLISTHESIS AT L4-L5 LEVEL: Primary | ICD-10-CM

## 2019-03-27 DIAGNOSIS — M48.02 SPINAL STENOSIS IN CERVICAL REGION: ICD-10-CM

## 2019-03-27 DIAGNOSIS — M51.16 LUMBAR DISC HERNIATION WITH RADICULOPATHY: ICD-10-CM

## 2019-03-27 DIAGNOSIS — M51.36 DISC DEGENERATION, LUMBAR: ICD-10-CM

## 2019-03-27 DIAGNOSIS — M47.816 OSTEOARTHRITIS OF LUMBAR SPINE, UNSPECIFIED SPINAL OSTEOARTHRITIS COMPLICATION STATUS: ICD-10-CM

## 2019-03-27 DIAGNOSIS — M50.30 DEGENERATIVE CERVICAL DISC: ICD-10-CM

## 2019-03-27 PROCEDURE — 72040 X-RAY EXAM NECK SPINE 2-3 VW: CPT | Mod: ,,, | Performed by: ORTHOPAEDIC SURGERY

## 2019-03-27 PROCEDURE — 72100 PR  X-RAY LUMBAR SPINE 2/3 VW: ICD-10-PCS | Mod: ,,, | Performed by: ORTHOPAEDIC SURGERY

## 2019-03-27 PROCEDURE — 1101F PT FALLS ASSESS-DOCD LE1/YR: CPT | Mod: ,,, | Performed by: ORTHOPAEDIC SURGERY

## 2019-03-27 PROCEDURE — 99213 OFFICE O/P EST LOW 20 MIN: CPT | Mod: ,,, | Performed by: ORTHOPAEDIC SURGERY

## 2019-03-27 PROCEDURE — 72100 X-RAY EXAM L-S SPINE 2/3 VWS: CPT | Mod: ,,, | Performed by: ORTHOPAEDIC SURGERY

## 2019-03-27 PROCEDURE — 99213 PR OFFICE/OUTPT VISIT, EST, LEVL III, 20-29 MIN: ICD-10-PCS | Mod: ,,, | Performed by: ORTHOPAEDIC SURGERY

## 2019-03-27 PROCEDURE — 1101F PR PT FALLS ASSESS DOC 0-1 FALLS W/OUT INJ PAST YR: ICD-10-PCS | Mod: ,,, | Performed by: ORTHOPAEDIC SURGERY

## 2019-03-27 PROCEDURE — 72040: ICD-10-PCS | Mod: ,,, | Performed by: ORTHOPAEDIC SURGERY

## 2019-03-27 NOTE — PROGRESS NOTES
Subjective:       Patient ID: Thao Perera is a 73 y.o. female.    Chief Complaint: Pain of the Neck (Neck pain radiates to right shoulder still. She did see Dr Lofton for trigger injections and that helped some) and Pain of the Lumbar Spine (Lumbar pain radiates down right leg to knee. No other treatment)      History of Present Illness  Patient returns for follow-up still have back pain radiating down both legs right side greater left she has neck pain radiating to the right shoulder she is undergone trigger point injections with Dr. Lofton.    Current Medications  Current Outpatient Medications   Medication Sig Dispense Refill    acetaminophen-codeine 300-30mg (TYLENOL #3) 300-30 mg Tab Take by mouth.      LYRICA 75 mg capsule        No current facility-administered medications for this visit.      Facility-Administered Medications Ordered in Other Visits   Medication Dose Route Frequency Provider Last Rate Last Dose    sodium chloride 0.9% flush 3 mL  3 mL Intravenous PRN Quincy Reyna MD           Allergies  Review of patient's allergies indicates:   Allergen Reactions    Percocet [oxycodone-acetaminophen] Other (See Comments)     Hallucinations  Can take Ultram, Tylenol #3 and Norco    Codeine        Past Medical History  Past Medical History:   Diagnosis Date    Arthritis        Surgical History  Past Surgical History:   Procedure Laterality Date    APPENDECTOMY      ARTHROPLASTY, HIP Right 9/13/2018    [facility]    ARTHROSCOPY-SHOULDER Left 7/15/2016    [facility] at [facility]    CHOLECYSTECTOMY      DECOMPRESSION-SHOULDER-ARTHROSCOPIC Left 7/15/2016    [facility]    EYE SURGERY Bilateral     IOL    HYSTERECTOMY      JOINT REPLACEMENT Left     hip, bilateral knees    REPAIR-ROTATOR CUFF / MINI OPEN Left 7/15/2016    [facility] at [facility]    TONSILLECTOMY         Family  History:   History reviewed. No pertinent family history.    Social History:   Social History     Socioeconomic History    Marital status: Single     Spouse name: Not on file    Number of children: Not on file    Years of education: Not on file    Highest education level: Not on file   Occupational History    Not on file   Social Needs    Financial resource strain: Not on file    Food insecurity:     Worry: Not on file     Inability: Not on file    Transportation needs:     Medical: Not on file     Non-medical: Not on file   Tobacco Use    Smoking status: Never Smoker    Smokeless tobacco: Never Used   Substance and Sexual Activity    Alcohol use: Yes     Alcohol/week: 0.6 oz     Types: 1 Glasses of wine per week     Comment: occasionally    Drug use: No    Sexual activity: Not on file   Lifestyle    Physical activity:     Days per week: Not on file     Minutes per session: Not on file    Stress: Not on file   Relationships    Social connections:     Talks on phone: Not on file     Gets together: Not on file     Attends L[employer] service: Not on file     Active member of club or organization: Not on file     Attends meetings of clubs or organizations: Not on file     Relationship status: Not on file    Intimate partner violence:     Fear of current or ex partner: Not on file     Emotionally abused: Not on file     Physically abused: Not on file     Forced sexual activity: Not on file   Other Topics Concern    Not on file   Social History Narrative    Not on file       Hospitalization/Major Diagnostic Procedure:     Review of Systems     General/Constitutional:  Chills denies. Fatigue denies. Fever denies. Weight gain denies. Weight loss denies.    Respiratory:  Shortness of breath denies.    Cardiovascular:  Chest pain denies.    Gastrointestinal:  Constipation denies. Diarrhea denies. Nausea denies. Vomiting denies.     Hematology:  Easy bruising denies. Prolonged bleeding denies.      Genitourinary:  Frequent urination denies. Pain in lower back denies. Painful urination denies.     Musculoskeletal:  See HPI for details    Skin:  Rash denies.    Neurologic:  Dizziness denies. Gait abnormalities denies. Seizures denies. Tingling/Numbess denies.    Psychiatric:  Anxiety denies. Depressed mood denies.     Objective:   Vital Signs:   Vitals:    03/27/19 1413   BP: 122/70   Pulse: (!) 56        Physical Exam      General Examination:     Constitutional: The patient is alert and oriented to lace person and time. Mood is pleasant.     Head/Face: Normal facial features normal eyebrows    Eyes: Normal extraocular motion bilaterally    Lungs: Respirations are equal and unlabored    Gait is coordinated.    Cardiovascular: There are no swelling or varicosities present.    Lymphatic: Negative for adenopathy    Skin: Normal    Neurological: Level of consciousness normal. Oriented to place person and time and situation    Psychiatric: Oriented to time place person and situation    Cervical exam shows a moderate restriction of motion tenderness in both trapezius muscles and a mild limitation of motion with negative Spurling's maneuver. Lumbar exam shows a moderate restriction of motion and tenderness over posterior iliac spine.  XRAY Report/ Interpretation:AP and lateral x-rays of the cervical spine were performed today. Indications neck pain. Findings: After level disc space narrowing with osteophyte formation C5-6 and C6-7 levels indicative cervical disc degeneration  AP and lateral x-rays of lumbar spine will performed today. Indications low back pain. Findings: Mild lumbar scoliosis disc degeneration L2-3 narrowing L3-4 disc and degenerative spondylolisthesis L4-5  Prior MRI was reviewed showing multilevel foraminal stenosis and facet joint arthritis      Assessment:       1. Spondylolisthesis at L4-L5 level    2. Osteoarthritis of lumbar spine, unspecified spinal osteoarthritis complication status    3.  Spinal stenosis in cervical region    4. Disc degeneration, lumbar    5. Lumbar disc herniation with radiculopathy    6. Degenerative cervical disc        Plan:       Thao was seen today for pain and pain.    Diagnoses and all orders for this visit:    Spondylolisthesis at L4-L5 level    Osteoarthritis of lumbar spine, unspecified spinal osteoarthritis complication status  -     X-Ray Lumbar Spine Ap And Lateral    Spinal stenosis in cervical region  -     X-Ray Cervical Spine AP And Lateral    Disc degeneration, lumbar    Lumbar disc herniation with radiculopathy  Comments:  L3-4    Degenerative cervical disc         No follow-ups on file.    Patient would benefit from interlaminar L4-5 epidural steroid injection I believe she has some neurogenic claudication in her legs the area of the greatest stenosis is at L4-5 in addition to the degenerative spondylolisthesis I've advised that she obtain a 4 pronged cane she would be's Kebede study and ambulating with a 4 pronged cane than a single pronged cane. Since she is been to Dr. Lofton in the past for injections we will refer her back to Dr. Lofton to undergo lumbar injections lumbar injections    This note was created using Dragon voice recognition software that occasionally misinterpreted phrases or words.

## 2019-03-27 NOTE — PATIENT INSTRUCTIONS
[facility]  [facility]      EPIDURAL STEROID INJECTION    What is the epidural space?    The membrane that covers the spinal cord and nerve roots in your spine is called the dura membrane. The space surrounding the dura is the epidural space. Nerves travel through the epidural space to your neck, back, legs and arms. Inflammation of these nerve roots may cause pain in these regions due to irritation from a damaged disc or from contact in some way with the bony structure of the spine.     What is an epidural steroid injection and why is it helpful?    An epidural injection places anti-inflammatory medicine into the epidural space to decrease inflammation of the nerve roots, hopefully reducing your pain. The epidural injection may help the injury to heal by reducing inflammation. It may provide permanent relief or a period of pain relief for several months while the injury or cause of your pain is healing.    What will happen to me during the procedure?    An IV will be started so that sedation can be given. You will be positioned in such a way that your doctor can best visualize the area to be injected. The skin on your back or neck will be scrubbed using sterile scrub (soap). Next, the physician will numb a small area of skin where the epidural needle will be placed. This medicine stings for several seconds. After the numbing medicine is effective, your doctor will direct a small epidural needle using x-ray guidance into the epidural space. A small of amount of contrast (dye) is then injected to insure proper needle position in the epidural space. Then a mixture of numbing medicine (anesthetic) and anti-inflammatory (cortisone/steroid) will be injected.     What will happen after the procedure?    You will go back to the recovery area where you will be monitored for 30-60 minutes. You may experience some numbness and/or weakness into your legs and arms for a few hours. The steroids  will begin working 3-5 days after the procedure and may continue to improve your pain for up to fourteen days.     You should have a follow up visit 2 weeks after the procedure to determine if further treatment is needed. These injections are often performed in a series of three (3) to provide the best possible results.     General Pre/Post Procedure Instructions:    You should not eat or drink anything after 12 oclock the night before the procedure. If you are diabetic, do not take your insulin or oral medication the morning of the procedure because you have had nothing to eat. If you are taking routine heart or blood pressure medicine, you should take it with a sip of water the morning of the procedure. You should not take medications that give pain relief or lessen your usual pain. These medicines can be restarted after the procedure if they are needed.     If you are on Coumadin, Heparin, Plavix or other blood thinners (including aspirin and all medications that contain aspirin), you must notify the office well in advance so the timing of these medications can be coordinated with your primary care physician.     You will be at the hospital/surgery center for a few hours for your procedure. A  must accompany you and be responsible for driving you home. No driving is allowed the day of the procedure. You may return to your normal activities the day after the procedure, including returning to work.     If you are unable to keep this appointment, please give notice as soon as possible and at least 24 hours in advance during regular office hours.    Thank you.

## 2019-04-09 ENCOUNTER — TELEPHONE (OUTPATIENT)
Dept: ORTHOPEDICS | Facility: CLINIC | Age: 74
End: 2019-04-09

## 2019-04-09 NOTE — TELEPHONE ENCOUNTER
----- Message from Linda Delgado sent at 4/3/2019 11:41 AM CDT -----  Contact: patient  Patient needs you to call her regarding an order for an epidural

## 2019-05-08 ENCOUNTER — OFFICE VISIT (OUTPATIENT)
Dept: ORTHOPEDICS | Facility: CLINIC | Age: 74
End: 2019-05-08
Payer: COMMERCIAL

## 2019-05-08 VITALS
HEART RATE: 50 BPM | SYSTOLIC BLOOD PRESSURE: 110 MMHG | DIASTOLIC BLOOD PRESSURE: 70 MMHG | HEIGHT: 67 IN | WEIGHT: 220 LBS | BODY MASS INDEX: 34.53 KG/M2

## 2019-05-08 DIAGNOSIS — M43.16 SPONDYLOLISTHESIS AT L4-L5 LEVEL: ICD-10-CM

## 2019-05-08 DIAGNOSIS — M70.71 BURSITIS OF OTHER BURSA OF RIGHT HIP: ICD-10-CM

## 2019-05-08 DIAGNOSIS — M48.062 LUMBAR STENOSIS WITH NEUROGENIC CLAUDICATION: Primary | ICD-10-CM

## 2019-05-08 PROCEDURE — 1101F PT FALLS ASSESS-DOCD LE1/YR: CPT | Mod: ,,, | Performed by: ORTHOPAEDIC SURGERY

## 2019-05-08 PROCEDURE — 99213 OFFICE O/P EST LOW 20 MIN: CPT | Mod: ,,, | Performed by: ORTHOPAEDIC SURGERY

## 2019-05-08 PROCEDURE — 99213 PR OFFICE/OUTPT VISIT, EST, LEVL III, 20-29 MIN: ICD-10-PCS | Mod: ,,, | Performed by: ORTHOPAEDIC SURGERY

## 2019-05-08 PROCEDURE — 1101F PR PT FALLS ASSESS DOC 0-1 FALLS W/OUT INJ PAST YR: ICD-10-PCS | Mod: ,,, | Performed by: ORTHOPAEDIC SURGERY

## 2019-05-08 RX ORDER — OXYBUTYNIN CHLORIDE 10 MG/1
TABLET, EXTENDED RELEASE ORAL
Refills: 3 | COMMUNITY
Start: 2019-04-12 | End: 2020-10-21

## 2019-05-08 NOTE — PROGRESS NOTES
Subjective:       Patient ID: Thao Perera is a 73 y.o. female.    Chief Complaint: Pain of the Lumbar Spine (Lumbar pain radiates down right leg to ankle. She did see Dr Jenkins and he gave injection. It helped a few days)      History of Present Illness    Continued intermittent low back pain that radiates to both legs right side greater than left. She saw Dr. Jenkins was a pain management doctor she couldn't get in to see Dr. Lofton he gave a chair (bursa injection of the right hip that only gave her temporary relief. I believe a pains predominate from her back.      Current Medications  Current Outpatient Medications   Medication Sig Dispense Refill    LYRICA 75 mg capsule       oxybutynin (DITROPAN-XL) 10 MG 24 hr tablet   3     No current facility-administered medications for this visit.      Facility-Administered Medications Ordered in Other Visits   Medication Dose Route Frequency Provider Last Rate Last Dose    sodium chloride 0.9% flush 3 mL  3 mL Intravenous PRN Quincy Reyna MD           Allergies  Review of patient's allergies indicates:  No Active Allergies    Past Medical History  Past Medical History:   Diagnosis Date    Arthritis        Surgical History  Past Surgical History:   Procedure Laterality Date    APPENDECTOMY      ARTHROPLASTY, HIP Right 9/13/2018    [facility]    ARTHROSCOPY-SHOULDER Left 7/15/2016    [facility] at [facility]    CHOLECYSTECTOMY      DECOMPRESSION-SHOULDER-ARTHROSCOPIC Left 7/15/2016    [facility]    EYE SURGERY Bilateral     IOL    HYSTERECTOMY      JOINT REPLACEMENT Left     hip, bilateral knees    REPAIR-ROTATOR CUFF / MINI OPEN Left 7/15/2016    [facility] at [facility]    TONSILLECTOMY         Family History:   History reviewed. No pertinent family history.    Social History:   Social History     Socioeconomic History    Marital  status: Single     Spouse name: Not on file    Number of children: Not on file    Years of education: Not on file    Highest education level: Not on file   Occupational History    Not on file   Social Needs    Financial resource strain: Not on file    Food insecurity:     Worry: Not on file     Inability: Not on file    Transportation needs:     Medical: Not on file     Non-medical: Not on file   Tobacco Use    Smoking status: Never Smoker    Smokeless tobacco: Never Used   Substance and Sexual Activity    Alcohol use: Yes     Alcohol/week: 0.6 oz     Types: 1 Glasses of wine per week     Comment: occasionally    Drug use: No    Sexual activity: Not on file   Lifestyle    Physical activity:     Days per week: Not on file     Minutes per session: Not on file    Stress: Not on file   Relationships    Social connections:     Talks on phone: Not on file     Gets together: Not on file     Attends [employer] service: Not on file     Active member of club or organization: Not on file     Attends meetings of clubs or organizations: Not on file     Relationship status: Not on file   Other Topics Concern    Not on file   Social History Narrative    Not on file       Hospitalization/Major Diagnostic Procedure:     Review of Systems     General/Constitutional:  Chills denies. Fatigue denies. Fever denies. Weight gain denies. Weight loss denies.    Respiratory:  Shortness of breath denies.    Cardiovascular:  Chest pain denies.    Gastrointestinal:  Constipation denies. Diarrhea denies. Nausea denies. Vomiting denies.     Hematology:  Easy bruising denies. Prolonged bleeding denies.     Genitourinary:  Frequent urination denies. Pain in lower back denies. Painful urination denies.     Musculoskeletal:  See HPI for details    Skin:  Rash denies.    Neurologic:  Dizziness denies. Gait abnormalities denies. Seizures denies. Tingling/Numbess denies.    Psychiatric:  Anxiety denies. Depressed mood denies.      Objective:   Vital Signs:   Vitals:    05/08/19 0818   BP: 110/70   Pulse: (!) 50        Physical Exam      General Examination:     Constitutional: The patient is alert and oriented to lace person and time. Mood is pleasant.     Head/Face: Normal facial features normal eyebrows    Eyes: Normal extraocular motion bilaterally    Lungs: Respirations are equal and unlabored    Gait is coordinated.    Cardiovascular: There are no swelling or varicosities present.    Lymphatic: Negative for adenopathy    Skin: Normal    Neurological: Level of consciousness normal. Oriented to place person and time and situation    Psychiatric: Oriented to time place person and situation    Patient name place with cane and has antalgic gait lumbar range of motion moderately restricted.  XRAY Report/ Interpretation: Previous x-ray lumbar MRI results. Again reviewed with the patient      Assessment:       1. Lumbar stenosis with neurogenic claudication    2. Spondylolisthesis at L4-L5 level    3. Bursitis of other bursa of right hip        Plan:       Thao was seen today for pain.    Diagnoses and all orders for this visit:    Lumbar stenosis with neurogenic claudication    Spondylolisthesis at L4-L5 level    Bursitis of other bursa of right hip         Follow up if symptoms worsen or fail to improve.    Treatment options were discussed and she like to follow-up with Dr. Lofton for lumbar injection I agree with that choice in advised    This note was created using Dragon voice recognition software that occasionally misinterpreted phrases or words.

## 2020-08-13 DIAGNOSIS — Z12.31 ENCOUNTER FOR SCREENING MAMMOGRAM FOR BREAST CANCER: Primary | ICD-10-CM

## 2020-08-27 ENCOUNTER — HOSPITAL ENCOUNTER (OUTPATIENT)
Dept: RADIOLOGY | Facility: HOSPITAL | Age: 75
Discharge: HOME | End: 2020-08-27
Attending: OBSTETRICS & GYNECOLOGY
Payer: COMMERCIAL

## 2020-08-27 DIAGNOSIS — Z12.31 ENCOUNTER FOR SCREENING MAMMOGRAM FOR BREAST CANCER: ICD-10-CM

## 2020-08-27 PROCEDURE — 77067 SCR MAMMO BI INCL CAD: CPT | Mod: TC,PO

## 2020-10-21 ENCOUNTER — OFFICE VISIT (OUTPATIENT)
Dept: ORTHOPEDICS | Facility: CLINIC | Age: 75
End: 2020-10-21
Payer: COMMERCIAL

## 2020-10-21 VITALS
SYSTOLIC BLOOD PRESSURE: 132 MMHG | HEIGHT: 67 IN | DIASTOLIC BLOOD PRESSURE: 80 MMHG | HEART RATE: 74 BPM | WEIGHT: 220 LBS | BODY MASS INDEX: 34.53 KG/M2

## 2020-10-21 DIAGNOSIS — M43.16 SPONDYLOLISTHESIS AT L4-L5 LEVEL: Primary | ICD-10-CM

## 2020-10-21 DIAGNOSIS — M48.062 LUMBAR STENOSIS WITH NEUROGENIC CLAUDICATION: ICD-10-CM

## 2020-10-21 DIAGNOSIS — M51.36 DISC DEGENERATION, LUMBAR: ICD-10-CM

## 2020-10-21 DIAGNOSIS — M48.02 SPINAL STENOSIS IN CERVICAL REGION: ICD-10-CM

## 2020-10-21 DIAGNOSIS — M50.30 DEGENERATIVE CERVICAL DISC: ICD-10-CM

## 2020-10-21 DIAGNOSIS — M41.116 JUVENILE IDIOPATHIC SCOLIOSIS OF LUMBAR REGION: ICD-10-CM

## 2020-10-21 PROCEDURE — 1159F PR MEDICATION LIST DOCUMENTED IN MEDICAL RECORD: ICD-10-PCS | Mod: S$GLB,,, | Performed by: ORTHOPAEDIC SURGERY

## 2020-10-21 PROCEDURE — 99213 OFFICE O/P EST LOW 20 MIN: CPT | Mod: S$GLB,,, | Performed by: ORTHOPAEDIC SURGERY

## 2020-10-21 PROCEDURE — 1125F AMNT PAIN NOTED PAIN PRSNT: CPT | Mod: S$GLB,,, | Performed by: ORTHOPAEDIC SURGERY

## 2020-10-21 PROCEDURE — 99213 PR OFFICE/OUTPT VISIT, EST, LEVL III, 20-29 MIN: ICD-10-PCS | Mod: S$GLB,,, | Performed by: ORTHOPAEDIC SURGERY

## 2020-10-21 PROCEDURE — 1125F PR PAIN SEVERITY QUANTIFIED, PAIN PRESENT: ICD-10-PCS | Mod: S$GLB,,, | Performed by: ORTHOPAEDIC SURGERY

## 2020-10-21 PROCEDURE — 1101F PR PT FALLS ASSESS DOC 0-1 FALLS W/OUT INJ PAST YR: ICD-10-PCS | Mod: S$GLB,,, | Performed by: ORTHOPAEDIC SURGERY

## 2020-10-21 PROCEDURE — 1159F MED LIST DOCD IN RCRD: CPT | Mod: S$GLB,,, | Performed by: ORTHOPAEDIC SURGERY

## 2020-10-21 PROCEDURE — 1101F PT FALLS ASSESS-DOCD LE1/YR: CPT | Mod: S$GLB,,, | Performed by: ORTHOPAEDIC SURGERY

## 2020-10-21 RX ORDER — ACETAMINOPHEN 500 MG
500 TABLET ORAL
COMMUNITY

## 2020-10-21 RX ORDER — FLUTICASONE FUROATE AND VILANTEROL TRIFENATATE 100; 25 UG/1; UG/1
POWDER RESPIRATORY (INHALATION)
COMMUNITY
Start: 2020-09-10

## 2020-10-21 NOTE — PROGRESS NOTES
Subjective:       Patient ID: Thao Perera is a 74 y.o. female.    Chief Complaint: Pain of the Lumbar Spine (Lumbar pain x a while States that her pain is getting worse, states that her pain stays in the back. )      History of Present Illness  Patient was last seen May 2019.  Diagnosed previously with lumbar spinal stenosis and neurogenic claudication.  When she was last seen we discussed treatment options and both the patient and Dr. Denis decided that further pain management was the best option.    Chief complaint today is low back pain with bilateral lower extremity radiculitis dysesthesia.  She also complains of significant bilateral parascapular pain.  We have seen her for her neck in the past.    She has been seeing Dr. dawson for at least 18 months and has had multiple procedures done.  But the patient is really unsure of what she has had done or what works or what does not.  The patient's significant other states that the injections that she received from the pain management physician helped but only for short period of time.    Current Medications  Current Outpatient Medications   Medication Sig Dispense Refill    acetaminophen (TYLENOL) 500 MG tablet Take 500 mg by mouth.      BREO ELLIPTA 100-25 mcg/dose diskus inhaler        No current facility-administered medications for this visit.      Facility-Administered Medications Ordered in Other Visits   Medication Dose Route Frequency Provider Last Rate Last Dose    sodium chloride 0.9% flush 3 mL  3 mL Intravenous PRN Quincy Reyna MD           Allergies  Review of patient's allergies indicates:  No Known Allergies    Past Medical History  Past Medical History:   Diagnosis Date    Arthritis        Surgical History  Past Surgical History:   Procedure Laterality Date    APPENDECTOMY      CHOLECYSTECTOMY      EYE SURGERY Bilateral     IOL    HIP ARTHROPLASTY Right 9/13/2018    Procedure: ARTHROPLASTY, HIP;  Surgeon: Quincy Reyna MD;   Location: [facility];  Service: Orthopedics;  Laterality: Right;    HYSTERECTOMY      JOINT REPLACEMENT Left     hip, bilateral knees    TONSILLECTOMY         Family History:   History reviewed. No pertinent family history.    Social History:   Social History     Socioeconomic History    Marital status: Single     Spouse name: Not on file    Number of children: Not on file    Years of education: Not on file    Highest education level: Not on file   Occupational History    Not on file   Social Needs    Financial resource strain: Not on file    Food insecurity     Worry: Not on file     Inability: Not on file    Transportation needs     Medical: Not on file     Non-medical: Not on file   Tobacco Use    Smoking status: Never Smoker    Smokeless tobacco: Never Used   Substance and Sexual Activity    Alcohol use: Yes     Alcohol/week: 1.0 standard drinks     Types: 1 Glasses of wine per week     Comment: occasionally    Drug use: No    Sexual activity: Not on file   Lifestyle    Physical activity     Days per week: Not on file     Minutes per session: Not on file    Stress: Not on file   Relationships    Social connections     Talks on phone: Not on file     Gets together: Not on file     Attends [employer] service: Not on file     Active member of club or organization: Not on file     Attends meetings of clubs or organizations: Not on file     Relationship status: Not on file   Other Topics Concern    Not on file   Social History Narrative    Not on file       Hospitalization/Major Diagnostic Procedure:     Review of Systems     General/Constitutional:  Chills denies. Fatigue denies. Fever denies. Weight gain denies. Weight loss denies.    Respiratory:  Shortness of breath denies.    Cardiovascular:  Chest pain denies.    Gastrointestinal:  Constipation denies. Diarrhea denies. Nausea denies. Vomiting denies.     Hematology:  Easy bruising denies. Prolonged bleeding denies.     Genitourinary:  Frequent  urination denies. Pain in lower back denies. Painful urination denies.     Musculoskeletal:  See HPI for details    Skin:  Rash denies.    Neurologic:  Dizziness denies. Gait abnormalities denies. Seizures denies. Tingling/Numbess denies.    Psychiatric:  Anxiety denies. Depressed mood denies.     Objective:   Vital Signs:   Vitals:    10/21/20 1456   BP: 132/80   Pulse: 74        Physical Exam      General Examination:     Constitutional: The patient is alert and oriented to lace person and time. Mood is pleasant.     Head/Face: Normal facial features normal eyebrows    Eyes: Normal extraocular motion bilaterally    Lungs: Respirations are equal and unlabored    Gait is coordinated.    Cardiovascular: There are no swelling or varicosities present.    Lymphatic: Negative for adenopathy    Skin: Normal    Neurological: Level of consciousness normal. Oriented to place person and time and situation    Psychiatric: Oriented to time place person and situation    Lumbar exam:  Significant antalgic gait with a straight cane.  Antalgic sit to stand.  Lumbar range of motion diminished in all planes.  Bilateral lower extremities are distal neurovascular intact with no focal neurologic weakness.  Subjective numbness L5 nerve root distribution both feet  Cervical exam moderate tenderness both trapezius muscle spasm on the right side moderate restriction of motion Spurling's maneuver causes neck pain only    XRAY Report/ Interpretation:  AP pelvis x-ray taken in the office today reviewed the patient demonstrates previous bilateral hip replacements.  No abnormalities there.  Lumbar AP and lateral x-rays taken in the office today reviewed the patient demonstrate advanced multilevel degenerative disc disease from L3 to the sacrum.  Severe right-sided disc degeneration at L3-4 causing scoliosis at that level.  Significant facet sclerosis L3 to the sacrum as well.  Mild scoliosis    I reviewed previous cervical diagnostic studies  which demonstrate degenerative disc disease as well.      Assessment:       1. Spondylolisthesis at L4-L5 level    2. Lumbar stenosis with neurogenic claudication    3. Disc degeneration, lumbar    4. Spinal stenosis in cervical region    5. Degenerative cervical disc        Plan:       Thao was seen today for pain.    Diagnoses and all orders for this visit:    Spondylolisthesis at L4-L5 level  -     X-Ray Lumbar Spine Ap And Lateral  -     X-Ray Pelvis Routine AP    Lumbar stenosis with neurogenic claudication  -     X-Ray Lumbar Spine Ap And Lateral  -     X-Ray Pelvis Routine AP    Disc degeneration, lumbar  -     X-Ray Lumbar Spine Ap And Lateral  -     X-Ray Pelvis Routine AP    Spinal stenosis in cervical region    Degenerative cervical disc         No follow-ups on file.   At this point in time she feels her neck pain and low back pain are intolerable she does have any radiation of pain to the arm but has back pain radiating to both legs and neurogenic claudication symptoms with prolonged standing and walking she is willing to entertain surgery option for both cervical and lumbar areas updated MRIs of the cervical and lumbar spine regions will be ordered                                          1q  This note was created using Dragon voice recognition software that occasionally misinterpreted phrases or words.

## 2020-11-09 ENCOUNTER — HOSPITAL ENCOUNTER (OUTPATIENT)
Dept: RADIOLOGY | Facility: HOSPITAL | Age: 75
Discharge: HOME | End: 2020-11-09
Attending: ORTHOPAEDIC SURGERY
Payer: COMMERCIAL

## 2020-11-09 DIAGNOSIS — M48.062 LUMBAR STENOSIS WITH NEUROGENIC CLAUDICATION: ICD-10-CM

## 2020-11-09 DIAGNOSIS — M43.16 SPONDYLOLISTHESIS AT L4-L5 LEVEL: ICD-10-CM

## 2020-11-09 DIAGNOSIS — M50.30 DEGENERATIVE CERVICAL DISC: ICD-10-CM

## 2020-11-09 DIAGNOSIS — M51.36 DISC DEGENERATION, LUMBAR: ICD-10-CM

## 2020-11-09 DIAGNOSIS — M48.02 SPINAL STENOSIS IN CERVICAL REGION: ICD-10-CM

## 2020-11-09 PROCEDURE — 72141 MRI NECK SPINE W/O DYE: CPT | Mod: TC,PO

## 2020-11-09 PROCEDURE — 72148 MRI LUMBAR SPINE W/O DYE: CPT | Mod: TC,PO

## 2020-11-11 ENCOUNTER — OFFICE VISIT (OUTPATIENT)
Dept: ORTHOPEDICS | Facility: CLINIC | Age: 75
End: 2020-11-11
Payer: COMMERCIAL

## 2020-11-11 VITALS
HEART RATE: 88 BPM | OXYGEN SATURATION: 98 % | HEIGHT: 67 IN | BODY MASS INDEX: 34.53 KG/M2 | DIASTOLIC BLOOD PRESSURE: 80 MMHG | SYSTOLIC BLOOD PRESSURE: 118 MMHG | WEIGHT: 220 LBS

## 2020-11-11 DIAGNOSIS — M48.02 SPINAL STENOSIS IN CERVICAL REGION: ICD-10-CM

## 2020-11-11 DIAGNOSIS — M41.116 JUVENILE IDIOPATHIC SCOLIOSIS OF LUMBAR REGION: ICD-10-CM

## 2020-11-11 DIAGNOSIS — M48.062 LUMBAR STENOSIS WITH NEUROGENIC CLAUDICATION: Primary | ICD-10-CM

## 2020-11-11 DIAGNOSIS — M47.812 ARTHROPATHY OF CERVICAL FACET JOINT: ICD-10-CM

## 2020-11-11 DIAGNOSIS — M43.16 SPONDYLOLISTHESIS AT L4-L5 LEVEL: ICD-10-CM

## 2020-11-11 DIAGNOSIS — M50.30 DEGENERATIVE CERVICAL DISC: ICD-10-CM

## 2020-11-11 DIAGNOSIS — M51.36 DISC DEGENERATION, LUMBAR: ICD-10-CM

## 2020-11-11 PROCEDURE — 99214 PR OFFICE/OUTPT VISIT, EST, LEVL IV, 30-39 MIN: ICD-10-PCS | Mod: S$GLB,,, | Performed by: ORTHOPAEDIC SURGERY

## 2020-11-11 PROCEDURE — 1159F PR MEDICATION LIST DOCUMENTED IN MEDICAL RECORD: ICD-10-PCS | Mod: S$GLB,,, | Performed by: ORTHOPAEDIC SURGERY

## 2020-11-11 PROCEDURE — 1101F PR PT FALLS ASSESS DOC 0-1 FALLS W/OUT INJ PAST YR: ICD-10-PCS | Mod: S$GLB,,, | Performed by: ORTHOPAEDIC SURGERY

## 2020-11-11 PROCEDURE — 1125F AMNT PAIN NOTED PAIN PRSNT: CPT | Mod: S$GLB,,, | Performed by: ORTHOPAEDIC SURGERY

## 2020-11-11 PROCEDURE — 1125F PR PAIN SEVERITY QUANTIFIED, PAIN PRESENT: ICD-10-PCS | Mod: S$GLB,,, | Performed by: ORTHOPAEDIC SURGERY

## 2020-11-11 PROCEDURE — 1101F PT FALLS ASSESS-DOCD LE1/YR: CPT | Mod: S$GLB,,, | Performed by: ORTHOPAEDIC SURGERY

## 2020-11-11 PROCEDURE — 1159F MED LIST DOCD IN RCRD: CPT | Mod: S$GLB,,, | Performed by: ORTHOPAEDIC SURGERY

## 2020-11-11 PROCEDURE — 99214 OFFICE O/P EST MOD 30 MIN: CPT | Mod: S$GLB,,, | Performed by: ORTHOPAEDIC SURGERY

## 2020-11-11 NOTE — PATIENT INSTRUCTIONS
Laminectomy  Vertebrae are the bones that make up the spine. Laminectomy is a surgery that removes the part of the vertebra called the lamina. This takes pressure off nerves in the low back and helps reduce symptoms. A similar surgery is called a laminotomy. For a laminotomy, only part of the lamina is removed.     The entire lamina is removed from the affected vertebra.   Before your surgery  Be sure to follow all of your doctor's instructions on preparing for surgery.  · Follow any directions you are given for not eating or drinking before surgery.  · If you take a daily medicine, ask if you should still take it the morning of surgery.  · If you take any blood-thinning medicines, such as aspirin, discuss them with your doctor at least a week before surgery.  · At the hospital, your temperature, pulse, breathing, and blood pressure will be checked.  · An IV or intravenous line may be started to provide fluids and medicines needed during surgery.  During your surgery  · Once in the operating room, you will be given anesthesia.  · After you are asleep, an incision is made near the center of your low back. The incision may be 2 to 6 inches long, depending on how many vertebrae are involved.  · During a laminectomy, the lamina, or bone that forms the back of the spinal canal, is removed from the affected vertebra. The opening created may be enough to take pressure off the nerve. If needed, your doctor can also remove any bone spurs or disk matter pressing on the nerve. After laminectomy, the opening in the spine is protected by the thick back muscles.  · Once the nerve is free of pressure, the incision is closed with stitches or surgical staples.  After your surgery  After surgery, youll be sent to the PACU or postanesthesia care unit. When you are fully awake, youll be moved to your room. The nurses will give you medicines to ease your pain. You may have a small tube called a catheter in your bladder. Soon,  healthcare providers will help you get up and moving. Youll also be shown how to keep your lungs clear.  When to call your healthcare provider  Once at home, call your provider if you have any of the symptoms below:  · Unusual redness, heat, or drainage at the incision site  · Increasing pain, numbness, or weakness in your leg  · Fever over 100.4°F (38°C)   Date Last Reviewed: 2/26/2016 © 2000-2017 [employer]. [address] [place] [address]. All rights reserved. This information is not intended as a substitute for professional medical care. Always follow your healthcare professional's instructions.            [facility]  [facility]      STEP 1: Diagnostic Medial Branch Blocks (Facet Nerve Blocks)    What are medial branch nerves? Why are medial branch blocks helpful?     Medial branch nerves are very small nerve branches that communicate pain caused by the facet joints in the spine. These nerves do not control any muscles or sensation in the arms or legs. They are located along a bony groove in the spine.     If this procedure has been scheduled, there is strong evidence to suspect that the facet joints are the source of your pain. Benefit may be obtained from having these medial branch nerves temporarily blocked with an anesthetic to see if a more permanent way of blocking these nerves would provide pain relief for a longer term. Blocking these medial branch nerves temporarily stops the transmission of pain signals from the facet joints to the brain. If you receive temporary relief of a portion of your pain after these injections you will likely benefit from radiofrequency ablation of the same nerves. Radiofrequency ablation provides the same amount of relief as the diagnostic blocks, but lasts approximately 6-12 months.     What happens during medial branch blocks?    An IV will be started, so that sedation can be given. You will be placed on the x-ray table  and positioned in such a way that the physician can best visualize the bony areas where the medial branch nerves pass. The skin is cleansed using sterile scrub (soap). Next the physician numbs a small area of skin with numbing medicine. The medicine stings for several seconds. Using x-ray guidance, your physician directs a small needle, near the specific nerve or nerves being tested. A small mixture of numbing medicine (anesthetic) and anti-inflammatory (cortisone/steroid) is then injected.    What happens after the procedure?  Immediately after the procedure, you will go back to the recovery area where you will be monitored for 30-60 minutes. You will be asked to report the immediate percentage of pain relief and record the relief experienced during that day on a post injection evaluation sheet (pain diary). You must call and set up a follow up appointment for two weeks after the procedure to discuss the results from this block and determine if you will proceed to step 2 of the treatment of your facet nerves.

## 2020-11-11 NOTE — PROGRESS NOTES
Subjective:       Patient ID: Thao Perera is a 74 y.o. female.    Chief Complaint: Pain of the Spine (Lumbar and cervical spine MRI review. )      History of Present Illness  Patient is here to review cervical and lumbar MRIs and discuss surgical options.    When at asked which area of her spine was more problematic or painful for her she was unable to give me a good answer.  She states that she is in a significant amount of pain daily throughout her entire spine.  She states that she thinks her low back hurts worse because she wakes up with the pain in the morning.  Her neck pain gets worse throughout the day; however she does admit that she wakes up with a headache which could be related to her neck.    Again the patient is ready to discuss surgical options for both her neck and her lumbar spine.    Current Medications  Current Outpatient Medications   Medication Sig Dispense Refill    acetaminophen (TYLENOL) 500 MG tablet Take 500 mg by mouth.      BREO ELLIPTA 100-25 mcg/dose diskus inhaler        No current facility-administered medications for this visit.      Facility-Administered Medications Ordered in Other Visits   Medication Dose Route Frequency Provider Last Rate Last Dose    sodium chloride 0.9% flush 3 mL  3 mL Intravenous PRN Quincy Reyna MD           Allergies  Review of patient's allergies indicates:  No Known Allergies    Past Medical History  Past Medical History:   Diagnosis Date    Arthritis        Surgical History  Past Surgical History:   Procedure Laterality Date    APPENDECTOMY      CHOLECYSTECTOMY      EYE SURGERY Bilateral     IOL    HIP ARTHROPLASTY Right 9/13/2018    Procedure: ARTHROPLASTY, HIP;  Surgeon: Quincy Reyna MD;  Location: [facility];  Service: Orthopedics;  Laterality: Right;    HYSTERECTOMY      JOINT REPLACEMENT Left     hip, bilateral knees    TONSILLECTOMY         Family History:   History reviewed. No pertinent family history.    Social History:    Social History     Socioeconomic History    Marital status: Single     Spouse name: Not on file    Number of children: Not on file    Years of education: Not on file    Highest education level: Not on file   Occupational History    Not on file   Social Needs    Financial resource strain: Not on file    Food insecurity     Worry: Not on file     Inability: Not on file    Transportation needs     Medical: Not on file     Non-medical: Not on file   Tobacco Use    Smoking status: Never Smoker    Smokeless tobacco: Never Used   Substance and Sexual Activity    Alcohol use: Yes     Alcohol/week: 1.0 standard drinks     Types: 1 Glasses of wine per week     Comment: occasionally    Drug use: No    Sexual activity: Not on file   Lifestyle    Physical activity     Days per week: Not on file     Minutes per session: Not on file    Stress: Not on file   Relationships    Social connections     Talks on phone: Not on file     Gets together: Not on file     Attends Nondenominational service: Not on file     Active member of club or organization: Not on file     Attends meetings of clubs or organizations: Not on file     Relationship status: Not on file   Other Topics Concern    Not on file   Social History Narrative    Not on file       Hospitalization/Major Diagnostic Procedure:     Review of Systems     General/Constitutional:  Chills denies. Fatigue denies. Fever denies. Weight gain denies. Weight loss denies.    Respiratory:  Shortness of breath denies.    Cardiovascular:  Chest pain denies.    Gastrointestinal:  Constipation denies. Diarrhea denies. Nausea denies. Vomiting denies.     Hematology:  Easy bruising denies. Prolonged bleeding denies.     Genitourinary:  Frequent urination denies. Pain in lower back denies. Painful urination denies.     Musculoskeletal:  See HPI for details    Skin:  Rash denies.    Neurologic:  Dizziness denies. Gait abnormalities denies. Seizures denies. Tingling/Numbess  denies.    Psychiatric:  Anxiety denies. Depressed mood denies.     Objective:   Vital Signs:   Vitals:    11/11/20 1222   BP: 118/80   Pulse: 88        Physical Exam      General Examination:     Constitutional: The patient is alert and oriented to lace person and time. Mood is pleasant.     Head/Face: Normal facial features normal eyebrows    Eyes: Normal extraocular motion bilaterally    Lungs: Respirations are equal and unlabored    Gait is coordinated.    Cardiovascular: There are no swelling or varicosities present.    Lymphatic: Negative for adenopathy    Skin: Normal    Neurological: Level of consciousness normal. Oriented to place person and time and situation    Psychiatric: Oriented to time place person and situation    Lumbar exam:  Significant antalgic gait with a straight cane.  Antalgic sit to stand.  Lumbar range of motion diminished in all planes.  Bilateral lower extremities are distal neurovascular intact with no focal neurologic weakness.  Subjective numbness L5 nerve root distribution both feet  Cervical exam moderate tenderness both trapezius muscle spasm on the right side moderate restriction of motion Spurling's maneuver causes neck pain only    XRAY Report/ Interpretation:  Cervical MRI reviewed with the patient the office today demonstrates multilevel degenerative disc disease and facet arthropathy with foraminal stenosis to varying degrees.    Lumbar MRI reviewed with the patient the office today demonstrates advanced multilevel degenerative disc disease with facet arthropathy causing varying degrees of central and foraminal stenosis worse at L3-4 and L4-5.      Assessment:       1. Lumbar stenosis with neurogenic claudication    2. Spondylolisthesis at L4-L5 level    3. Disc degeneration, lumbar    4. Juvenile idiopathic scoliosis of lumbar region    5. Degenerative cervical disc    6. Spinal stenosis in cervical region    7. Arthropathy of cervical facet joint        Plan:       Thao  was seen today for pain.    Diagnoses and all orders for this visit:    Lumbar stenosis with neurogenic claudication    Spondylolisthesis at L4-L5 level    Disc degeneration, lumbar    Juvenile idiopathic scoliosis of lumbar region    Degenerative cervical disc    Spinal stenosis in cervical region    Arthropathy of cervical facet joint         No follow-ups on file.    With regards to her lower back and leg symptoms I believe the pains on account but is a combination of multilevel disc degeneration multilevel facet arthritis scoliosis of the lumbar spine and central with foraminal stenosis I have explained to her that her axial back pain may be improved with a series of medial branch blocks and lumbar rhizotomy is if appropriate.  Regards to her stenosis it is multilevel and likely will only be improved with a lumbar laminectomy multiple levels.  Hopefully medial branch blocks and possible rhizotomy can help her axial back pain so that it for neurogenic claudication symptoms are not tolerable and not manageable she would be a candidate to undergo a laminectomy alone without a spinal fusion if she needed to have a spinal fusion I have explained it would be multilevel quite an extensive procedure that she may not want to consider but should the rhizotomy of the lumbar spine failed the only treatment to lessen her back pain would be stabilization surgically    She wishes to be referred to Dr. Joe Schwartz who is a pain management doctor in the [place] and then will make the appropriate referral she will return back to see me after undergoing an assessment by Dr. Schwartz    This note was created using Dragon voice recognition software that occasionally misinterpreted phrases or words.

## 2020-11-19 ENCOUNTER — TELEPHONE (OUTPATIENT)
Dept: ORTHOPEDICS | Facility: CLINIC | Age: 75
End: 2020-11-19

## 2020-11-19 NOTE — TELEPHONE ENCOUNTER
----- Message from Mirna Rosa sent at 11/19/2020  9:21 AM CST -----  Patient would like for Dr Denis to know she is going to be tested for covid today  because she has been exposed.

## 2020-12-09 ENCOUNTER — TELEPHONE (OUTPATIENT)
Dept: ORTHOPEDICS | Facility: CLINIC | Age: 75
End: 2020-12-09

## 2020-12-09 ENCOUNTER — OFFICE VISIT (OUTPATIENT)
Dept: ORTHOPEDICS | Facility: CLINIC | Age: 75
End: 2020-12-09
Payer: COMMERCIAL

## 2020-12-09 VITALS — HEIGHT: 67 IN | WEIGHT: 220 LBS | BODY MASS INDEX: 34.53 KG/M2

## 2020-12-09 DIAGNOSIS — M47.816 LUMBAR FACET ARTHROPATHY: ICD-10-CM

## 2020-12-09 DIAGNOSIS — M48.062 LUMBAR STENOSIS WITH NEUROGENIC CLAUDICATION: ICD-10-CM

## 2020-12-09 DIAGNOSIS — M43.16 SPONDYLOLISTHESIS AT L4-L5 LEVEL: Primary | ICD-10-CM

## 2020-12-09 DIAGNOSIS — M51.36 DISC DEGENERATION, LUMBAR: ICD-10-CM

## 2020-12-09 PROCEDURE — 1125F PR PAIN SEVERITY QUANTIFIED, PAIN PRESENT: ICD-10-PCS | Mod: S$GLB,,, | Performed by: ORTHOPAEDIC SURGERY

## 2020-12-09 PROCEDURE — 1125F AMNT PAIN NOTED PAIN PRSNT: CPT | Mod: S$GLB,,, | Performed by: ORTHOPAEDIC SURGERY

## 2020-12-09 PROCEDURE — 99213 PR OFFICE/OUTPT VISIT, EST, LEVL III, 20-29 MIN: ICD-10-PCS | Mod: S$GLB,,, | Performed by: ORTHOPAEDIC SURGERY

## 2020-12-09 PROCEDURE — 1100F PTFALLS ASSESS-DOCD GE2>/YR: CPT | Mod: S$GLB,,, | Performed by: ORTHOPAEDIC SURGERY

## 2020-12-09 PROCEDURE — 3288F FALL RISK ASSESSMENT DOCD: CPT | Mod: S$GLB,,, | Performed by: ORTHOPAEDIC SURGERY

## 2020-12-09 PROCEDURE — 3288F PR FALLS RISK ASSESSMENT DOCUMENTED: ICD-10-PCS | Mod: S$GLB,,, | Performed by: ORTHOPAEDIC SURGERY

## 2020-12-09 PROCEDURE — 1100F PR PT FALLS ASSESS DOC 2+ FALLS/FALL W/INJURY/YR: ICD-10-PCS | Mod: S$GLB,,, | Performed by: ORTHOPAEDIC SURGERY

## 2020-12-09 PROCEDURE — 1159F MED LIST DOCD IN RCRD: CPT | Mod: S$GLB,,, | Performed by: ORTHOPAEDIC SURGERY

## 2020-12-09 PROCEDURE — 1159F PR MEDICATION LIST DOCUMENTED IN MEDICAL RECORD: ICD-10-PCS | Mod: S$GLB,,, | Performed by: ORTHOPAEDIC SURGERY

## 2020-12-09 PROCEDURE — 99213 OFFICE O/P EST LOW 20 MIN: CPT | Mod: S$GLB,,, | Performed by: ORTHOPAEDIC SURGERY

## 2020-12-09 NOTE — PROGRESS NOTES
Subjective:       Patient ID: Thao Perera is a 75 y.o. female.    Chief Complaint: Pain of the Lumbar Spine (Says Dr Schwartz office did not call her to schedule, the order was faxed on 11/11/2020, pain is same, no leg pain, ambulates with cane)      History of Present Illness  Chronic follow-up back and leg pain a referral was sent to pain management for medial branch blocks which he never heard from the pain management doctor symptoms are unchanged    Current Medications  Current Outpatient Medications   Medication Sig Dispense Refill    acetaminophen (TYLENOL) 500 MG tablet Take 500 mg by mouth.      BREO ELLIPTA 100-25 mcg/dose diskus inhaler        No current facility-administered medications for this visit.      Facility-Administered Medications Ordered in Other Visits   Medication Dose Route Frequency Provider Last Rate Last Dose    sodium chloride 0.9% flush 3 mL  3 mL Intravenous PRN Quincy Reyna MD           Allergies  Review of patient's allergies indicates:  No Known Allergies    Past Medical History  Past Medical History:   Diagnosis Date    Arthritis        Surgical History  Past Surgical History:   Procedure Laterality Date    APPENDECTOMY      CHOLECYSTECTOMY      EYE SURGERY Bilateral     IOL    HIP ARTHROPLASTY Right 9/13/2018    Procedure: ARTHROPLASTY, HIP;  Surgeon: Quincy Reyna MD;  Location: [facility];  Service: Orthopedics;  Laterality: Right;    HYSTERECTOMY      JOINT REPLACEMENT Left     hip, bilateral knees    TONSILLECTOMY         Family History:   History reviewed. No pertinent family history.    Social History:   Social History     Socioeconomic History    Marital status: Single     Spouse name: Not on file    Number of children: Not on file    Years of education: Not on file    Highest education level: Not on file   Occupational History    Not on file   Social Needs    Financial resource strain: Not on file    Food insecurity     Worry: Not on file      Inability: Not on file    Transportation needs     Medical: Not on file     Non-medical: Not on file   Tobacco Use    Smoking status: Never Smoker    Smokeless tobacco: Never Used   Substance and Sexual Activity    Alcohol use: Yes     Alcohol/week: 1.0 standard drinks     Types: 1 Glasses of wine per week     Comment: occasionally    Drug use: No    Sexual activity: Not on file   Lifestyle    Physical activity     Days per week: Not on file     Minutes per session: Not on file    Stress: Not on file   Relationships    Social connections     Talks on phone: Not on file     Gets together: Not on file     Attends [employer] service: Not on file     Active member of club or organization: Not on file     Attends meetings of clubs or organizations: Not on file     Relationship status: Not on file   Other Topics Concern    Not on file   Social History Narrative    Not on file       Hospitalization/Major Diagnostic Procedure:     Review of Systems     General/Constitutional:  Chills denies. Fatigue denies. Fever denies. Weight gain denies. Weight loss denies.    Respiratory:  Shortness of breath denies.    Cardiovascular:  Chest pain denies.    Gastrointestinal:  Constipation denies. Diarrhea denies. Nausea denies. Vomiting denies.     Hematology:  Easy bruising denies. Prolonged bleeding denies.     Genitourinary:  Frequent urination denies. Pain in lower back denies. Painful urination denies.     Musculoskeletal:  See HPI for details    Skin:  Rash denies.    Neurologic:  Dizziness denies. Gait abnormalities denies. Seizures denies. Tingling/Numbess denies.    Psychiatric:  Anxiety denies. Depressed mood denies.     Objective:   Vital Signs: There were no vitals filed for this visit.     Physical Exam      General Examination:     Constitutional: The patient is alert and oriented to lace person and time. Mood is pleasant.     Head/Face: Normal facial features normal eyebrows    Eyes: Normal extraocular motion  bilaterally    Lungs: Respirations are equal and unlabored    Gait is coordinated.    Cardiovascular: There are no swelling or varicosities present.    Lymphatic: Negative for adenopathy    Skin: Normal    Neurological: Level of consciousness normal. Oriented to place person and time and situation    Psychiatric: Oriented to time place person and situation    Difficulty rising from a chair moderate restriction of motion pain with extension and bending lumbar spine no spasm  XRAY Report/ Interpretation:  None today      Assessment:       1. Spondylolisthesis at L4-L5 level    2. Lumbar stenosis with neurogenic claudication    3. Disc degeneration, lumbar    4. Lumbar facet arthropathy        Plan:       Thao was seen today for pain.    Diagnoses and all orders for this visit:    Spondylolisthesis at L4-L5 level    Lumbar stenosis with neurogenic claudication    Disc degeneration, lumbar    Lumbar facet arthropathy         No follow-ups on file.    Referral made for lumbar medial branch blocks return of the medial branch blocks    This note was created using Dragon voice recognition software that occasionally misinterpreted phrases or words.

## 2020-12-09 NOTE — TELEPHONE ENCOUNTER
----- Message from Tash Reilly sent at 12/9/2020  8:29 AM CST -----  Pt called and stated she has an appointment at 12:30pm. Pt stated Provider and Dr. Schwartz were supposed to consult with one another. Pt is wanting to know if that was done before she comes in. -ALANNA

## 2021-01-15 ENCOUNTER — TELEPHONE (OUTPATIENT)
Dept: PAIN MEDICINE | Facility: OTHER | Age: 76
End: 2021-01-15

## 2021-01-15 DIAGNOSIS — M47.816 LUMBAR SPONDYLOSIS: Primary | ICD-10-CM

## 2021-01-25 ENCOUNTER — TELEPHONE (OUTPATIENT)
Dept: ORTHOPEDICS | Facility: CLINIC | Age: 76
End: 2021-01-25

## 2021-01-27 ENCOUNTER — OFFICE VISIT (OUTPATIENT)
Dept: ORTHOPEDICS | Facility: CLINIC | Age: 76
End: 2021-01-27
Payer: COMMERCIAL

## 2021-01-27 VITALS
HEIGHT: 67 IN | BODY MASS INDEX: 34.06 KG/M2 | HEART RATE: 62 BPM | DIASTOLIC BLOOD PRESSURE: 73 MMHG | WEIGHT: 217 LBS | SYSTOLIC BLOOD PRESSURE: 121 MMHG

## 2021-01-27 DIAGNOSIS — M51.36 DISC DEGENERATION, LUMBAR: ICD-10-CM

## 2021-01-27 DIAGNOSIS — M47.816 LUMBAR FACET ARTHROPATHY: Primary | ICD-10-CM

## 2021-01-27 PROCEDURE — 3288F FALL RISK ASSESSMENT DOCD: CPT | Mod: S$GLB,,, | Performed by: ORTHOPAEDIC SURGERY

## 2021-01-27 PROCEDURE — 1125F PR PAIN SEVERITY QUANTIFIED, PAIN PRESENT: ICD-10-PCS | Mod: S$GLB,,, | Performed by: ORTHOPAEDIC SURGERY

## 2021-01-27 PROCEDURE — 99213 OFFICE O/P EST LOW 20 MIN: CPT | Mod: S$GLB,,, | Performed by: ORTHOPAEDIC SURGERY

## 2021-01-27 PROCEDURE — 99213 PR OFFICE/OUTPT VISIT, EST, LEVL III, 20-29 MIN: ICD-10-PCS | Mod: S$GLB,,, | Performed by: ORTHOPAEDIC SURGERY

## 2021-01-27 PROCEDURE — 1159F PR MEDICATION LIST DOCUMENTED IN MEDICAL RECORD: ICD-10-PCS | Mod: S$GLB,,, | Performed by: ORTHOPAEDIC SURGERY

## 2021-01-27 PROCEDURE — 1101F PT FALLS ASSESS-DOCD LE1/YR: CPT | Mod: S$GLB,,, | Performed by: ORTHOPAEDIC SURGERY

## 2021-01-27 PROCEDURE — 1101F PR PT FALLS ASSESS DOC 0-1 FALLS W/OUT INJ PAST YR: ICD-10-PCS | Mod: S$GLB,,, | Performed by: ORTHOPAEDIC SURGERY

## 2021-01-27 PROCEDURE — 1125F AMNT PAIN NOTED PAIN PRSNT: CPT | Mod: S$GLB,,, | Performed by: ORTHOPAEDIC SURGERY

## 2021-01-27 PROCEDURE — 1159F MED LIST DOCD IN RCRD: CPT | Mod: S$GLB,,, | Performed by: ORTHOPAEDIC SURGERY

## 2021-01-27 PROCEDURE — 3288F PR FALLS RISK ASSESSMENT DOCUMENTED: ICD-10-PCS | Mod: S$GLB,,, | Performed by: ORTHOPAEDIC SURGERY

## 2021-01-27 RX ORDER — ASPIRIN 325 MG
325 TABLET ORAL DAILY
COMMUNITY

## 2021-02-01 ENCOUNTER — TELEPHONE (OUTPATIENT)
Dept: ORTHOPEDICS | Facility: CLINIC | Age: 76
End: 2021-02-01

## 2021-02-24 ENCOUNTER — TELEPHONE (OUTPATIENT)
Dept: PAIN MEDICINE | Facility: OTHER | Age: 76
End: 2021-02-24

## 2021-02-24 DIAGNOSIS — M47.816 LUMBAR SPONDYLOSIS: Primary | ICD-10-CM

## 2021-03-02 ENCOUNTER — TELEPHONE (OUTPATIENT)
Dept: ORTHOPEDICS | Facility: CLINIC | Age: 76
End: 2021-03-02

## 2021-03-03 ENCOUNTER — HOSPITAL ENCOUNTER (OUTPATIENT)
Facility: OTHER | Age: 76
Discharge: HOME | End: 2021-03-03
Attending: ANESTHESIOLOGY | Admitting: ANESTHESIOLOGY
Payer: COMMERCIAL

## 2021-03-03 VITALS
BODY MASS INDEX: 34.06 KG/M2 | HEIGHT: 67 IN | WEIGHT: 217 LBS | SYSTOLIC BLOOD PRESSURE: 136 MMHG | RESPIRATION RATE: 16 BRPM | DIASTOLIC BLOOD PRESSURE: 69 MMHG | HEART RATE: 60 BPM | OXYGEN SATURATION: 95 % | TEMPERATURE: 99 F

## 2021-03-03 DIAGNOSIS — M48.062 LUMBAR STENOSIS WITH NEUROGENIC CLAUDICATION: ICD-10-CM

## 2021-03-03 DIAGNOSIS — M51.16 LUMBAR DISC HERNIATION WITH RADICULOPATHY: Primary | ICD-10-CM

## 2021-03-03 DIAGNOSIS — M51.36 DISC DEGENERATION, LUMBAR: ICD-10-CM

## 2021-03-03 DIAGNOSIS — M47.816 LUMBAR SPONDYLOSIS: ICD-10-CM

## 2021-03-03 DIAGNOSIS — M43.16 SPONDYLOLISTHESIS AT L4-L5 LEVEL: ICD-10-CM

## 2021-03-03 PROCEDURE — 64494 INJ PARAVERT F JNT L/S 2 LEV: CPT | Mod: 50 | Performed by: ANESTHESIOLOGY

## 2021-03-03 PROCEDURE — 64493 INJ PARAVERT F JNT L/S 1 LEV: CPT | Mod: 50 | Performed by: ANESTHESIOLOGY

## 2021-03-03 PROCEDURE — 25000003 PHARM REV CODE 250: Performed by: ANESTHESIOLOGY

## 2021-03-03 PROCEDURE — 63600175 PHARM REV CODE 636 W HCPCS: Performed by: ANESTHESIOLOGY

## 2021-03-03 RX ORDER — BUPIVACAINE HYDROCHLORIDE 5 MG/ML
10 INJECTION, SOLUTION EPIDURAL; INTRACAUDAL ONCE
Status: COMPLETED | OUTPATIENT
Start: 2021-03-03 | End: 2021-03-03

## 2021-03-03 RX ORDER — TRIAMCINOLONE ACETONIDE 40 MG/ML
INJECTION, SUSPENSION INTRA-ARTICULAR; INTRAMUSCULAR
Status: DISCONTINUED | OUTPATIENT
Start: 2021-03-03 | End: 2021-03-03 | Stop reason: HOSPADM

## 2021-03-03 RX ORDER — LIDOCAINE HYDROCHLORIDE 10 MG/ML
10 INJECTION INFILTRATION; PERINEURAL ONCE
Status: COMPLETED | OUTPATIENT
Start: 2021-03-03 | End: 2021-03-03

## 2021-03-03 RX ORDER — BETAMETHASONE SODIUM PHOSPHATE AND BETAMETHASONE ACETATE 3; 3 MG/ML; MG/ML
6 INJECTION, SUSPENSION INTRA-ARTICULAR; INTRALESIONAL; INTRAMUSCULAR; SOFT TISSUE
Status: DISCONTINUED | OUTPATIENT
Start: 2021-03-03 | End: 2021-03-03 | Stop reason: HOSPADM

## 2021-03-03 RX ORDER — ALPRAZOLAM 0.5 MG/1
1 TABLET, ORALLY DISINTEGRATING ORAL ONCE AS NEEDED
Status: DISCONTINUED | OUTPATIENT
Start: 2021-03-03 | End: 2021-03-03 | Stop reason: HOSPADM

## 2021-04-26 ENCOUNTER — TELEPHONE (OUTPATIENT)
Dept: PAIN MEDICINE | Facility: OTHER | Age: 76
End: 2021-04-26

## 2021-04-26 DIAGNOSIS — M47.816 SPONDYLOSIS WITHOUT MYELOPATHY OR RADICULOPATHY, LUMBAR REGION: Primary | ICD-10-CM

## 2021-04-28 ENCOUNTER — HOSPITAL ENCOUNTER (OUTPATIENT)
Facility: OTHER | Age: 76
Discharge: HOME | End: 2021-04-28
Attending: ANESTHESIOLOGY | Admitting: ANESTHESIOLOGY
Payer: COMMERCIAL

## 2021-04-28 VITALS
DIASTOLIC BLOOD PRESSURE: 75 MMHG | HEIGHT: 66 IN | SYSTOLIC BLOOD PRESSURE: 128 MMHG | TEMPERATURE: 99 F | RESPIRATION RATE: 16 BRPM | BODY MASS INDEX: 35.36 KG/M2 | HEART RATE: 53 BPM | OXYGEN SATURATION: 93 % | WEIGHT: 220 LBS

## 2021-04-28 DIAGNOSIS — M47.816 LUMBAR SPONDYLOSIS: Primary | ICD-10-CM

## 2021-04-28 PROCEDURE — 64493 INJ PARAVERT F JNT L/S 1 LEV: CPT | Mod: 50 | Performed by: ANESTHESIOLOGY

## 2021-04-28 PROCEDURE — 63600175 PHARM REV CODE 636 W HCPCS: Performed by: ANESTHESIOLOGY

## 2021-04-28 PROCEDURE — 25000003 PHARM REV CODE 250: Performed by: ANESTHESIOLOGY

## 2021-04-28 PROCEDURE — 64494 INJ PARAVERT F JNT L/S 2 LEV: CPT | Mod: 50 | Performed by: ANESTHESIOLOGY

## 2021-04-28 RX ORDER — SODIUM CHLORIDE 9 MG/ML
INJECTION, SOLUTION INTRAVENOUS CONTINUOUS
Status: DISCONTINUED | OUTPATIENT
Start: 2021-04-28 | End: 2021-04-28 | Stop reason: HOSPADM

## 2021-04-28 RX ORDER — MIDAZOLAM HYDROCHLORIDE 1 MG/ML
5 INJECTION INTRAMUSCULAR; INTRAVENOUS ONCE
Status: COMPLETED | OUTPATIENT
Start: 2021-04-28 | End: 2021-04-28

## 2021-04-28 RX ORDER — FENTANYL CITRATE 50 UG/ML
1 INJECTION, SOLUTION INTRAMUSCULAR; INTRAVENOUS ONCE
Status: COMPLETED | OUTPATIENT
Start: 2021-04-28 | End: 2021-04-28

## 2021-04-28 RX ORDER — LIDOCAINE HYDROCHLORIDE 10 MG/ML
10 INJECTION INFILTRATION; PERINEURAL ONCE
Status: DISCONTINUED | OUTPATIENT
Start: 2021-04-28 | End: 2021-04-28 | Stop reason: HOSPADM

## 2021-04-28 RX ORDER — FENTANYL CITRATE 50 UG/ML
INJECTION, SOLUTION INTRAMUSCULAR; INTRAVENOUS
Status: DISCONTINUED | OUTPATIENT
Start: 2021-04-28 | End: 2021-04-28 | Stop reason: HOSPADM

## 2021-04-28 RX ORDER — MIDAZOLAM HYDROCHLORIDE 1 MG/ML
INJECTION INTRAMUSCULAR; INTRAVENOUS
Status: DISCONTINUED | OUTPATIENT
Start: 2021-04-28 | End: 2021-04-28 | Stop reason: HOSPADM

## 2021-04-28 RX ORDER — TRIAMCINOLONE ACETONIDE 40 MG/ML
40 INJECTION, SUSPENSION INTRA-ARTICULAR; INTRAMUSCULAR ONCE
Status: DISCONTINUED | OUTPATIENT
Start: 2021-04-28 | End: 2021-04-28 | Stop reason: HOSPADM

## 2021-04-28 RX ORDER — BUPIVACAINE HYDROCHLORIDE 5 MG/ML
10 INJECTION, SOLUTION PERINEURAL ONCE
Status: DISCONTINUED | OUTPATIENT
Start: 2021-04-28 | End: 2021-04-28 | Stop reason: HOSPADM

## 2021-04-28 RX ORDER — ALPRAZOLAM 0.5 MG/1
0.5 TABLET ORAL ONCE AS NEEDED
Status: DISCONTINUED | OUTPATIENT
Start: 2021-04-28 | End: 2021-04-28

## 2021-04-28 RX ADMIN — SODIUM CHLORIDE: 0.9 INJECTION, SOLUTION INTRAVENOUS at 07:04

## 2021-12-08 ENCOUNTER — OFFICE VISIT (OUTPATIENT)
Dept: ORTHOPEDICS | Facility: CLINIC | Age: 76
End: 2021-12-08

## 2021-12-08 VITALS — WEIGHT: 220 LBS | HEIGHT: 66 IN | BODY MASS INDEX: 35.36 KG/M2

## 2021-12-08 DIAGNOSIS — M48.062 LUMBAR STENOSIS WITH NEUROGENIC CLAUDICATION: ICD-10-CM

## 2021-12-08 DIAGNOSIS — M41.116 JUVENILE IDIOPATHIC SCOLIOSIS OF LUMBAR REGION: ICD-10-CM

## 2021-12-08 DIAGNOSIS — M43.16 SPONDYLOLISTHESIS AT L4-L5 LEVEL: ICD-10-CM

## 2021-12-08 DIAGNOSIS — M47.816 LUMBAR FACET ARTHROPATHY: Primary | ICD-10-CM

## 2021-12-08 DIAGNOSIS — M51.36 DISC DEGENERATION, LUMBAR: ICD-10-CM

## 2021-12-08 PROCEDURE — 99213 OFFICE O/P EST LOW 20 MIN: CPT | Mod: S$GLB,,, | Performed by: ORTHOPAEDIC SURGERY

## 2021-12-08 PROCEDURE — 99213 PR OFFICE/OUTPT VISIT, EST, LEVL III, 20-29 MIN: ICD-10-PCS | Mod: S$GLB,,, | Performed by: ORTHOPAEDIC SURGERY

## 2022-03-09 ENCOUNTER — OFFICE VISIT (OUTPATIENT)
Dept: ORTHOPEDICS | Facility: CLINIC | Age: 77
End: 2022-03-09
Payer: COMMERCIAL

## 2022-03-09 VITALS — HEIGHT: 66 IN | WEIGHT: 220 LBS | BODY MASS INDEX: 35.36 KG/M2

## 2022-03-09 DIAGNOSIS — M47.816 LUMBAR FACET ARTHROPATHY: Primary | ICD-10-CM

## 2022-03-09 DIAGNOSIS — M43.16 SPONDYLOLISTHESIS AT L4-L5 LEVEL: ICD-10-CM

## 2022-03-09 DIAGNOSIS — M51.36 DISC DEGENERATION, LUMBAR: ICD-10-CM

## 2022-03-09 PROCEDURE — 99213 PR OFFICE/OUTPT VISIT, EST, LEVL III, 20-29 MIN: ICD-10-PCS | Mod: S$GLB,,, | Performed by: ORTHOPAEDIC SURGERY

## 2022-03-09 PROCEDURE — 3288F PR FALLS RISK ASSESSMENT DOCUMENTED: ICD-10-PCS | Mod: S$GLB,,, | Performed by: ORTHOPAEDIC SURGERY

## 2022-03-09 PROCEDURE — 1125F AMNT PAIN NOTED PAIN PRSNT: CPT | Mod: S$GLB,,, | Performed by: ORTHOPAEDIC SURGERY

## 2022-03-09 PROCEDURE — 1100F PR PT FALLS ASSESS DOC 2+ FALLS/FALL W/INJURY/YR: ICD-10-PCS | Mod: S$GLB,,, | Performed by: ORTHOPAEDIC SURGERY

## 2022-03-09 PROCEDURE — 1160F PR REVIEW ALL MEDS BY PRESCRIBER/CLIN PHARMACIST DOCUMENTED: ICD-10-PCS | Mod: S$GLB,,, | Performed by: ORTHOPAEDIC SURGERY

## 2022-03-09 PROCEDURE — 3288F FALL RISK ASSESSMENT DOCD: CPT | Mod: S$GLB,,, | Performed by: ORTHOPAEDIC SURGERY

## 2022-03-09 PROCEDURE — 1100F PTFALLS ASSESS-DOCD GE2>/YR: CPT | Mod: S$GLB,,, | Performed by: ORTHOPAEDIC SURGERY

## 2022-03-09 PROCEDURE — 1125F PR PAIN SEVERITY QUANTIFIED, PAIN PRESENT: ICD-10-PCS | Mod: S$GLB,,, | Performed by: ORTHOPAEDIC SURGERY

## 2022-03-09 PROCEDURE — 1159F PR MEDICATION LIST DOCUMENTED IN MEDICAL RECORD: ICD-10-PCS | Mod: S$GLB,,, | Performed by: ORTHOPAEDIC SURGERY

## 2022-03-09 PROCEDURE — 1160F RVW MEDS BY RX/DR IN RCRD: CPT | Mod: S$GLB,,, | Performed by: ORTHOPAEDIC SURGERY

## 2022-03-09 PROCEDURE — 1159F MED LIST DOCD IN RCRD: CPT | Mod: S$GLB,,, | Performed by: ORTHOPAEDIC SURGERY

## 2022-03-09 PROCEDURE — 99213 OFFICE O/P EST LOW 20 MIN: CPT | Mod: S$GLB,,, | Performed by: ORTHOPAEDIC SURGERY

## 2022-03-09 RX ORDER — PREGABALIN 75 MG/1
75 CAPSULE ORAL
COMMUNITY

## 2022-03-09 RX ORDER — DONEPEZIL HYDROCHLORIDE 5 MG/1
5 TABLET, FILM COATED ORAL
COMMUNITY

## 2022-03-09 NOTE — PROGRESS NOTES
Subjective:       Patient ID: Thao Perera is a 76 y.o. female.    Chief Complaint: Pain of the Spine (Pt is here for a 3 mth f/up on lumbar pain, Feeling better. )      History of Present Illness    Prior to meeting with the patient I reviewed the medical chart in [facility]. This included reviewing the previous progress notes from our office, review of the patient's last appointment with their primary care provider, review of any visits to the emergency room, and review of any pain management appointments or procedures.   For her low back pain she is scheduled undergoes injections with Dr. Vyas and deshaun but is overall feeling better though she requires a walker for her stability    Current Medications  Current Outpatient Medications   Medication Sig Dispense Refill    acetaminophen (TYLENOL) 500 MG tablet Take 500 mg by mouth.      BREO ELLIPTA 100-25 mcg/dose diskus inhaler       donepeziL (ARICEPT) 5 MG tablet Take 5 mg by mouth.      pregabalin (LYRICA) 75 MG capsule Take 75 mg by mouth.      aspirin 325 MG tablet Take 325 mg by mouth once daily.       No current facility-administered medications for this visit.     Facility-Administered Medications Ordered in Other Visits   Medication Dose Route Frequency Provider Last Rate Last Admin    sodium chloride 0.9% flush 3 mL  3 mL Intravenous PRN Quincy Reyna MD           Allergies  Review of patient's allergies indicates:  No Known Allergies    Past Medical History  Past Medical History:   Diagnosis Date    Arthritis        Surgical History  Past Surgical History:   Procedure Laterality Date    APPENDECTOMY      CHOLECYSTECTOMY      EYE SURGERY Bilateral     IOL    HIP ARTHROPLASTY Right 9/13/2018    Procedure: ARTHROPLASTY, HIP;  Surgeon: Quincy Reyna MD;  Location: [facility];  Service: Orthopedics;  Laterality: Right;    HYSTERECTOMY      INJECTION OF ANESTHETIC AGENT AROUND NERVE Bilateral 3/3/2021    Procedure: BLOCK, NERVE BILATERAL L3, 4,  5 MEDIAL BRANCH BLOCK;  Surgeon: Joe Schwartz MD;  Location: [facility];  Service: Pain Management;  Laterality: Bilateral;  NEEDS CONSENT    INJECTION OF ANESTHETIC AGENT AROUND NERVE Bilateral 4/28/2021    Procedure: BLOCK, NERVE BILATERAL L3, 4, 5 MEDIAL BRANCH BLOCK;  Surgeon: Joe Schwartz MD;  Location: [facility];  Service: Pain Management;  Laterality: Bilateral;  NEEDS CONSENT    JOINT REPLACEMENT Left     hip, bilateral knees    TONSILLECTOMY         Family History:   History reviewed. No pertinent family history.    Social History:   Social History     Socioeconomic History    Marital status: Single   Tobacco Use    Smoking status: Never Smoker    Smokeless tobacco: Never Used   Substance and Sexual Activity    Alcohol use: Yes     Alcohol/week: 1.0 standard drink     Types: 1 Glasses of wine per week     Comment: occasionally    Drug use: No       Hospitalization/Major Diagnostic Procedure:     Review of Systems     General/Constitutional:  Chills denies. Fatigue denies. Fever denies. Weight gain denies. Weight loss denies.    Respiratory:  Shortness of breath denies.    Cardiovascular:  Chest pain denies.    Gastrointestinal:  Constipation denies. Diarrhea denies. Nausea denies. Vomiting denies.     Hematology:  Easy bruising denies. Prolonged bleeding denies.     Genitourinary:  Frequent urination denies. Pain in lower back denies. Painful urination denies.     Musculoskeletal:  See HPI for details    Skin:  Rash denies.    Neurologic:  Dizziness denies. Gait abnormalities denies. Seizures denies. Tingling/Numbess denies.    Psychiatric:  Anxiety denies. Depressed mood denies.     Objective:   Vital Signs: There were no vitals filed for this visit.     Physical Exam      General Examination:     Constitutional: The patient is alert and oriented to lace person and time. Mood is pleasant.     Head/Face: Normal facial features normal eyebrows    Eyes: Normal extraocular motion  bilaterally    Lungs: Respirations are equal and unlabored    Gait is coordinated.    Cardiovascular: There are no swelling or varicosities present.    Lymphatic: Negative for adenopathy    Skin: Normal    Neurological: Level of consciousness normal. Oriented to place person and time and situation    Psychiatric: Oriented to time place person and situation    Ambulates with a walker lumbar spine moderately tender  XRAY Report/ Interpretation:       Assessment:       1. Lumbar facet arthropathy    2. Disc degeneration, lumbar    3. Spondylolisthesis at L4-L5 level        Plan:       Thao was seen today for pain.    Diagnoses and all orders for this visit:    Lumbar facet arthropathy    Disc degeneration, lumbar    Spondylolisthesis at L4-L5 level         Follow up in about 2 months (around 5/9/2022) for lumbar follow up.    I advised patient to continue with pain management modalities and returned 2 months for follow-up evaluation  Treatment options were discussed with regards to the nature of the medical condition. Conservative pain intervention and surgical options were discussed in detail. The probability of success of each separate treatment option was discussed. The patient expressed a clear understanding of the treatment options. With regards to surgery, the procedure risk, benefits, complications, and outcomes were discussed. No guarantees were given with regards to surgical outcome.   The risk of complications, morbidity, and mortality of patient management decisions have been made at the time of this visit. These are associated with the patient's problems, diagnostic procedures and treatment options. This includes the possible management options selected and those considered but not selected by the patient after shared medical decision making we discussed with the patient.     This note was created using Dragon voice recognition software that occasionally misinterpreted phrases or words.

## 2022-05-09 ENCOUNTER — OFFICE VISIT (OUTPATIENT)
Dept: ORTHOPEDICS | Facility: CLINIC | Age: 77
End: 2022-05-09
Payer: COMMERCIAL

## 2022-05-09 VITALS — HEIGHT: 66 IN | WEIGHT: 220 LBS | BODY MASS INDEX: 35.36 KG/M2

## 2022-05-09 DIAGNOSIS — M43.16 SPONDYLOLISTHESIS AT L4-L5 LEVEL: ICD-10-CM

## 2022-05-09 DIAGNOSIS — M47.816 LUMBAR FACET ARTHROPATHY: ICD-10-CM

## 2022-05-09 DIAGNOSIS — M48.062 LUMBAR STENOSIS WITH NEUROGENIC CLAUDICATION: ICD-10-CM

## 2022-05-09 DIAGNOSIS — Z96.643 HISTORY OF HIP REPLACEMENT, TOTAL, BILATERAL: ICD-10-CM

## 2022-05-09 DIAGNOSIS — M70.61 GREATER TROCHANTERIC BURSITIS OF RIGHT HIP: Primary | ICD-10-CM

## 2022-05-09 DIAGNOSIS — M51.36 DISC DEGENERATION, LUMBAR: ICD-10-CM

## 2022-05-09 PROCEDURE — 3288F PR FALLS RISK ASSESSMENT DOCUMENTED: ICD-10-PCS | Mod: CPTII,S$GLB,, | Performed by: ORTHOPAEDIC SURGERY

## 2022-05-09 PROCEDURE — 1100F PR PT FALLS ASSESS DOC 2+ FALLS/FALL W/INJURY/YR: ICD-10-PCS | Mod: CPTII,S$GLB,, | Performed by: ORTHOPAEDIC SURGERY

## 2022-05-09 PROCEDURE — 20610 DRAIN/INJ JOINT/BURSA W/O US: CPT | Mod: RT,S$GLB,, | Performed by: ORTHOPAEDIC SURGERY

## 2022-05-09 PROCEDURE — 1100F PTFALLS ASSESS-DOCD GE2>/YR: CPT | Mod: CPTII,S$GLB,, | Performed by: ORTHOPAEDIC SURGERY

## 2022-05-09 PROCEDURE — 3288F FALL RISK ASSESSMENT DOCD: CPT | Mod: CPTII,S$GLB,, | Performed by: ORTHOPAEDIC SURGERY

## 2022-05-09 PROCEDURE — 99213 PR OFFICE/OUTPT VISIT, EST, LEVL III, 20-29 MIN: ICD-10-PCS | Mod: 25,S$GLB,, | Performed by: ORTHOPAEDIC SURGERY

## 2022-05-09 PROCEDURE — 99213 OFFICE O/P EST LOW 20 MIN: CPT | Mod: 25,S$GLB,, | Performed by: ORTHOPAEDIC SURGERY

## 2022-05-09 PROCEDURE — 20610 LARGE JOINT ASPIRATION/INJECTION: R GREATER TROCHANTERIC BURSA: ICD-10-PCS | Mod: RT,S$GLB,, | Performed by: ORTHOPAEDIC SURGERY

## 2022-05-09 PROCEDURE — 1125F AMNT PAIN NOTED PAIN PRSNT: CPT | Mod: CPTII,S$GLB,, | Performed by: ORTHOPAEDIC SURGERY

## 2022-05-09 PROCEDURE — 1125F PR PAIN SEVERITY QUANTIFIED, PAIN PRESENT: ICD-10-PCS | Mod: CPTII,S$GLB,, | Performed by: ORTHOPAEDIC SURGERY

## 2022-05-09 PROCEDURE — 1160F PR REVIEW ALL MEDS BY PRESCRIBER/CLIN PHARMACIST DOCUMENTED: ICD-10-PCS | Mod: CPTII,S$GLB,, | Performed by: ORTHOPAEDIC SURGERY

## 2022-05-09 PROCEDURE — 1159F MED LIST DOCD IN RCRD: CPT | Mod: CPTII,S$GLB,, | Performed by: ORTHOPAEDIC SURGERY

## 2022-05-09 PROCEDURE — 1159F PR MEDICATION LIST DOCUMENTED IN MEDICAL RECORD: ICD-10-PCS | Mod: CPTII,S$GLB,, | Performed by: ORTHOPAEDIC SURGERY

## 2022-05-09 PROCEDURE — 1160F RVW MEDS BY RX/DR IN RCRD: CPT | Mod: CPTII,S$GLB,, | Performed by: ORTHOPAEDIC SURGERY

## 2022-05-09 RX ORDER — CEFUROXIME AXETIL 500 MG/1
500 TABLET ORAL 2 TIMES DAILY
COMMUNITY
Start: 2022-04-25

## 2022-05-09 RX ORDER — TRIAMCINOLONE ACETONIDE 40 MG/ML
40 INJECTION, SUSPENSION INTRA-ARTICULAR; INTRAMUSCULAR
Status: DISCONTINUED | OUTPATIENT
Start: 2022-05-09 | End: 2022-05-09 | Stop reason: HOSPADM

## 2022-05-09 RX ORDER — FUROSEMIDE 20 MG/1
20 TABLET ORAL DAILY
COMMUNITY
Start: 2022-05-05

## 2022-05-09 RX ADMIN — TRIAMCINOLONE ACETONIDE 40 MG: 40 INJECTION, SUSPENSION INTRA-ARTICULAR; INTRAMUSCULAR at 12:05

## 2022-05-09 NOTE — PROCEDURES
Large Joint Aspiration/Injection: R greater trochanteric bursa    Date/Time: 5/9/2022 12:30 PM  Performed by: Yadiel Denis MD  Authorized by: Yadiel Denis MD     Consent Done?:  Yes (Verbal)  Indications:  Pain  Site marked: the procedure site was marked    Timeout: prior to procedure the correct patient, procedure, and site was verified    Prep: patient was prepped and draped in usual sterile fashion      Local anesthesia used?: Yes    Local anesthetic:  Lidocaine 1% without epinephrine    Details:  Needle Size:  22 G  Ultrasonic Guidance for needle placement?: No    Location:  Hip  Site:  R greater trochanteric bursa  Medications:  40 mg triamcinolone acetonide 40 mg/mL  Patient tolerance:  Patient tolerated the procedure well with no immediate complications

## 2022-05-09 NOTE — PROGRESS NOTES
Subjective:       Patient ID: Thao Perera is a 76 y.o. female.    Chief Complaint: Pain of the Lumbar Spine (Patient reports that her pain has been much better.)      History of Present Illness    Prior to meeting with the patient I reviewed the medical chart in [facility]. This included reviewing the previous progress notes from our office, review of the patient's last appointment with their primary care provider, review of any visits to the emergency room, and review of any pain management appointments or procedures.   Patient returns for follow-up in her back actually feels a little bit better today her main concern is she has some probable with balance and having pain in her right hip she has had bilateral total hip replacements performed elsewhere.    Current Medications  Current Outpatient Medications   Medication Sig Dispense Refill    acetaminophen (TYLENOL) 500 MG tablet Take 500 mg by mouth.      BREO ELLIPTA 100-25 mcg/dose diskus inhaler       cefUROXime (CEFTIN) 500 MG tablet Take 500 mg by mouth 2 (two) times daily.      donepeziL (ARICEPT) 5 MG tablet Take 5 mg by mouth.      furosemide (LASIX) 20 MG tablet Take 20 mg by mouth once daily.      pregabalin (LYRICA) 75 MG capsule Take 75 mg by mouth.      aspirin 325 MG tablet Take 325 mg by mouth once daily.       No current facility-administered medications for this visit.     Facility-Administered Medications Ordered in Other Visits   Medication Dose Route Frequency Provider Last Rate Last Admin    sodium chloride 0.9% flush 3 mL  3 mL Intravenous PRN Quincy Reyna MD           Allergies  Review of patient's allergies indicates:  No Known Allergies    Past Medical History  Past Medical History:   Diagnosis Date    Arthritis        Surgical History  Past Surgical History:   Procedure Laterality Date    APPENDECTOMY      CHOLECYSTECTOMY      EYE SURGERY Bilateral     IOL    HIP ARTHROPLASTY Right 9/13/2018    Procedure: ARTHROPLASTY,  HIP;  Surgeon: Quincy Reyna MD;  Location: [facility] OR;  Service: Orthopedics;  Laterality: Right;    HYSTERECTOMY      INJECTION OF ANESTHETIC AGENT AROUND NERVE Bilateral 3/3/2021    Procedure: BLOCK, NERVE BILATERAL L3, 4, 5 MEDIAL BRANCH BLOCK;  Surgeon: Joe Schwartz MD;  Location: [facility];  Service: Pain Management;  Laterality: Bilateral;  NEEDS CONSENT    INJECTION OF ANESTHETIC AGENT AROUND NERVE Bilateral 4/28/2021    Procedure: BLOCK, NERVE BILATERAL L3, 4, 5 MEDIAL BRANCH BLOCK;  Surgeon: Joe Schwartz MD;  Location: [facility];  Service: Pain Management;  Laterality: Bilateral;  NEEDS CONSENT    JOINT REPLACEMENT Left     hip, bilateral knees    TONSILLECTOMY         Family History:   History reviewed. No pertinent family history.    Social History:   Social History     Socioeconomic History    Marital status: Single   Tobacco Use    Smoking status: Never Smoker    Smokeless tobacco: Never Used   Substance and Sexual Activity    Alcohol use: Yes     Alcohol/week: 1.0 standard drink     Types: 1 Glasses of wine per week     Comment: occasionally    Drug use: No       Hospitalization/Major Diagnostic Procedure:     Review of Systems     General/Constitutional:  Chills denies. Fatigue denies. Fever denies. Weight gain denies. Weight loss denies.    Respiratory:  Shortness of breath denies.    Cardiovascular:  Chest pain denies.    Gastrointestinal:  Constipation denies. Diarrhea denies. Nausea denies. Vomiting denies.     Hematology:  Easy bruising denies. Prolonged bleeding denies.     Genitourinary:  Frequent urination denies. Pain in lower back denies. Painful urination denies.     Musculoskeletal:  See HPI for details    Skin:  Rash denies.    Neurologic:  Dizziness denies. Gait abnormalities denies. Seizures denies. Tingling/Numbess denies.    Psychiatric:  Anxiety denies. Depressed mood denies.     Objective:   Vital Signs: There were no vitals filed for this visit.      Physical Exam      General Examination:     Constitutional: The patient is alert and oriented to lace person and time. Mood is pleasant.     Head/Face: Normal facial features normal eyebrows    Eyes: Normal extraocular motion bilaterally    Lungs: Respirations are equal and unlabored    Gait is coordinated.    Cardiovascular: There are no swelling or varicosities present.    Lymphatic: Negative for adenopathy    Skin: Normal    Neurological: Level of consciousness normal. Oriented to place person and time and situation    Psychiatric: Oriented to time place person and situation    Lumbar spine nontender right hip marked tenderness over the greater trochanter mild pain in the hip with rotational movements.  Straight-leg-raising negative hip range of motion endpoint pain knee range of motion normal rhomboid test negative  XRAY Report/ Interpretation:  Prior x-rays were reviewed she has bilateral total hip replacements the last x-rays performed showed no signs of any hardware loosening      Assessment:       1. Greater trochanteric bursitis of right hip    2. Lumbar facet arthropathy    3. Disc degeneration, lumbar    4. Spondylolisthesis at L4-L5 level    5. Lumbar stenosis with neurogenic claudication    6. History of hip replacement, total, bilateral        Plan:       Thao was seen today for pain.    Diagnoses and all orders for this visit:    Greater trochanteric bursitis of right hip    Lumbar facet arthropathy    Disc degeneration, lumbar    Spondylolisthesis at L4-L5 level    Lumbar stenosis with neurogenic claudication    History of hip replacement, total, bilateral         Follow up in about 3 months (around 8/9/2022).    After discussion treatment options the trochanteric bursa right hip was injected with 1 cc Celestone 4 cc of xylocaine no side effects were noted patient advised this is unsuccessful she has go back and see our physician who did the total hip replacement for a re-evaluation of that area.   She has currently under treatment Dr. Layne for low back and is doing well.    Regards to her issues with balance I have advised she may consider seeing a neurologist  Treatment options were discussed with regards to the nature of the medical condition. Conservative pain intervention and surgical options were discussed in detail. The probability of success of each separate treatment option was discussed. The patient expressed a clear understanding of the treatment options. With regards to surgery, the procedure risk, benefits, complications, and outcomes were discussed. No guarantees were given with regards to surgical outcome.   The risk of complications, morbidity, and mortality of patient management decisions have been made at the time of this visit. These are associated with the patient's problems, diagnostic procedures and treatment options. This includes the possible management options selected and those considered but not selected by the patient after shared medical decision making we discussed with the patient.     This note was created using Dragon voice recognition software that occasionally misinterpreted phrases or words.

## 2022-08-10 ENCOUNTER — OFFICE VISIT (OUTPATIENT)
Dept: ORTHOPEDICS | Facility: CLINIC | Age: 77
End: 2022-08-10
Payer: COMMERCIAL

## 2022-08-10 VITALS — BODY MASS INDEX: 35.36 KG/M2 | HEIGHT: 66 IN | WEIGHT: 220 LBS

## 2022-08-10 DIAGNOSIS — M48.061 LUMBAR FORAMINAL STENOSIS: ICD-10-CM

## 2022-08-10 DIAGNOSIS — M47.816 LUMBAR FACET ARTHROPATHY: Primary | ICD-10-CM

## 2022-08-10 PROCEDURE — 1159F MED LIST DOCD IN RCRD: CPT | Mod: CPTII,S$GLB,, | Performed by: ORTHOPAEDIC SURGERY

## 2022-08-10 PROCEDURE — 1160F PR REVIEW ALL MEDS BY PRESCRIBER/CLIN PHARMACIST DOCUMENTED: ICD-10-PCS | Mod: CPTII,S$GLB,, | Performed by: ORTHOPAEDIC SURGERY

## 2022-08-10 PROCEDURE — 1125F PR PAIN SEVERITY QUANTIFIED, PAIN PRESENT: ICD-10-PCS | Mod: CPTII,S$GLB,, | Performed by: ORTHOPAEDIC SURGERY

## 2022-08-10 PROCEDURE — 3288F FALL RISK ASSESSMENT DOCD: CPT | Mod: CPTII,S$GLB,, | Performed by: ORTHOPAEDIC SURGERY

## 2022-08-10 PROCEDURE — 3288F PR FALLS RISK ASSESSMENT DOCUMENTED: ICD-10-PCS | Mod: CPTII,S$GLB,, | Performed by: ORTHOPAEDIC SURGERY

## 2022-08-10 PROCEDURE — 1101F PT FALLS ASSESS-DOCD LE1/YR: CPT | Mod: CPTII,S$GLB,, | Performed by: ORTHOPAEDIC SURGERY

## 2022-08-10 PROCEDURE — 99213 PR OFFICE/OUTPT VISIT, EST, LEVL III, 20-29 MIN: ICD-10-PCS | Mod: S$GLB,,, | Performed by: ORTHOPAEDIC SURGERY

## 2022-08-10 PROCEDURE — 1160F RVW MEDS BY RX/DR IN RCRD: CPT | Mod: CPTII,S$GLB,, | Performed by: ORTHOPAEDIC SURGERY

## 2022-08-10 PROCEDURE — 99213 OFFICE O/P EST LOW 20 MIN: CPT | Mod: S$GLB,,, | Performed by: ORTHOPAEDIC SURGERY

## 2022-08-10 PROCEDURE — 1101F PR PT FALLS ASSESS DOC 0-1 FALLS W/OUT INJ PAST YR: ICD-10-PCS | Mod: CPTII,S$GLB,, | Performed by: ORTHOPAEDIC SURGERY

## 2022-08-10 PROCEDURE — 1159F PR MEDICATION LIST DOCUMENTED IN MEDICAL RECORD: ICD-10-PCS | Mod: CPTII,S$GLB,, | Performed by: ORTHOPAEDIC SURGERY

## 2022-08-10 PROCEDURE — 1125F AMNT PAIN NOTED PAIN PRSNT: CPT | Mod: CPTII,S$GLB,, | Performed by: ORTHOPAEDIC SURGERY

## 2022-08-10 NOTE — PROGRESS NOTES
Subjective:       Patient ID: Thao Perera is a 76 y.o. female.    Chief Complaint: Pain of the Lumbar Spine (Patient is still having lumbar pain, pain radiates down the right leg to calf, she states that she gets leg cramps.) and Pain of the Right Hip (Right hip injection 05/09/22, patient reports that she only go relief for a day, she is still having pain in her right hip.)      History of Present Illness    Prior to meeting with the patient I reviewed the medical chart in [facility]. This included reviewing the previous progress notes from our office, review of the patient's last appointment with their primary care provider, review of any visits to the emergency room, and review of any pain management appointments or procedures.   Patient returns with some continued complaints of back pain with intermittent numbness and pain radiating down her right leg she had lumbar radiofrequency ablations performed by Dr. Schwartz which did not give her long-lasting relief of her symptoms.  She does have some cervical symptoms and multiple arthralgias for the back and right leg symptoms for her predominant complaint she would like to look into further treatment and has scheduled an appointment next week with Dr. Layne.  Dr. Conway did see her in the past    Current Medications  Current Outpatient Medications   Medication Sig Dispense Refill    acetaminophen (TYLENOL) 500 MG tablet Take 500 mg by mouth.      aspirin 325 MG tablet Take 325 mg by mouth once daily.      BREO ELLIPTA 100-25 mcg/dose diskus inhaler       cefUROXime (CEFTIN) 500 MG tablet Take 500 mg by mouth 2 (two) times daily.      donepeziL (ARICEPT) 5 MG tablet Take 5 mg by mouth.      furosemide (LASIX) 20 MG tablet Take 20 mg by mouth once daily.      pregabalin (LYRICA) 75 MG capsule Take 75 mg by mouth.       No current facility-administered medications for this visit.     Facility-Administered Medications Ordered in Other Visits   Medication Dose Route  Frequency Provider Last Rate Last Admin    sodium chloride 0.9% flush 3 mL  3 mL Intravenous PRN Quincy Reyna MD           Allergies  Review of patient's allergies indicates:  No Known Allergies    Past Medical History  Past Medical History:   Diagnosis Date    Arthritis        Surgical History  Past Surgical History:   Procedure Laterality Date    APPENDECTOMY      CHOLECYSTECTOMY      EYE SURGERY Bilateral     IOL    HIP ARTHROPLASTY Right 9/13/2018    Procedure: ARTHROPLASTY, HIP;  Surgeon: Quincy Reyna MD;  Location: [facility] OR;  Service: Orthopedics;  Laterality: Right;    HYSTERECTOMY      INJECTION OF ANESTHETIC AGENT AROUND NERVE Bilateral 3/3/2021    Procedure: BLOCK, NERVE BILATERAL L3, 4, 5 MEDIAL BRANCH BLOCK;  Surgeon: Joe Schwartz MD;  Location: [facility];  Service: Pain Management;  Laterality: Bilateral;  NEEDS CONSENT    INJECTION OF ANESTHETIC AGENT AROUND NERVE Bilateral 4/28/2021    Procedure: BLOCK, NERVE BILATERAL L3, 4, 5 MEDIAL BRANCH BLOCK;  Surgeon: Joe Schwartz MD;  Location: [facility];  Service: Pain Management;  Laterality: Bilateral;  NEEDS CONSENT    JOINT REPLACEMENT Left     hip, bilateral knees    TONSILLECTOMY         Family History:   History reviewed. No pertinent family history.    Social History:   Social History     Socioeconomic History    Marital status: Single   Tobacco Use    Smoking status: Never Smoker    Smokeless tobacco: Never Used   Substance and Sexual Activity    Alcohol use: Yes     Alcohol/week: 1.0 standard drink     Types: 1 Glasses of wine per week     Comment: occasionally    Drug use: No       Hospitalization/Major Diagnostic Procedure:     Review of Systems     General/Constitutional:  Chills denies. Fatigue denies. Fever denies. Weight gain denies. Weight loss denies.    Respiratory:  Shortness of breath denies.    Cardiovascular:  Chest pain denies.    Gastrointestinal:  Constipation denies. Diarrhea denies.  Nausea denies. Vomiting denies.     Hematology:  Easy bruising denies. Prolonged bleeding denies.     Genitourinary:  Frequent urination denies. Pain in lower back denies. Painful urination denies.     Musculoskeletal:  See HPI for details    Skin:  Rash denies.    Neurologic:  Dizziness denies. Gait abnormalities denies. Seizures denies. Tingling/Numbess denies.    Psychiatric:  Anxiety denies. Depressed mood denies.     Objective:   Vital Signs: There were no vitals filed for this visit.     Physical Exam      General Examination:     Constitutional: The patient is alert and oriented to lace person and time. Mood is pleasant.     Head/Face: Normal facial features normal eyebrows    Eyes: Normal extraocular motion bilaterally    Lungs: Respirations are equal and unlabored    Gait is coordinated.    Cardiovascular: There are no swelling or varicosities present.    Lymphatic: Negative for adenopathy    Skin: Normal    Neurological: Level of consciousness normal. Oriented to place person and time and situation    Psychiatric: Oriented to time place person and situation    Her straight leg raising maneuver is positive on the right side lumbar range of motion moderate restricted  XRAY Report/ Interpretation:  MRI from November 2020 was personally reviewed there is evidence of foraminal stenosis on the right side at the L4-5 level      Assessment:       1. Lumbar facet arthropathy    2. Lumbar foraminal stenosis        Plan:       Thao was seen today for pain and pain.    Diagnoses and all orders for this visit:    Lumbar facet arthropathy    Lumbar foraminal stenosis         Follow up in about 3 months (around 11/10/2022).    Patient should be referred back to Dr. Layne for possible epidural steroid injection probably at the L4-5 level since the radiofrequency ablation did not give her substantial relief of her symptoms  Treatment options were discussed with regards to the nature of the medical condition. Conservative  pain intervention and surgical options were discussed in detail. The probability of success of each separate treatment option was discussed. The patient expressed a clear understanding of the treatment options. With regards to surgery, the procedure risk, benefits, complications, and outcomes were discussed. No guarantees were given with regards to surgical outcome.   The risk of complications, morbidity, and mortality of patient management decisions have been made at the time of this visit. These are associated with the patient's problems, diagnostic procedures and treatment options. This includes the possible management options selected and those considered but not selected by the patient after shared medical decision making we discussed with the patient.     This note was created using Dragon voice recognition software that occasionally misinterpreted phrases or words.

## 2022-08-31 ENCOUNTER — TELEPHONE (OUTPATIENT)
Dept: ORTHOPEDICS | Facility: CLINIC | Age: 77
End: 2022-08-31
Payer: COMMERCIAL

## 2022-08-31 DIAGNOSIS — M51.36 DDD (DEGENERATIVE DISC DISEASE), LUMBAR: Primary | ICD-10-CM

## 2022-10-20 ENCOUNTER — HOSPITAL ENCOUNTER (EMERGENCY)
Facility: HOSPITAL | Age: 77
Discharge: HOME | End: 2022-10-20
Attending: EMERGENCY MEDICINE
Payer: COMMERCIAL

## 2022-10-20 ENCOUNTER — OFFICE VISIT (OUTPATIENT)
Dept: URGENT CARE | Facility: CLINIC | Age: 77
End: 2022-10-20
Payer: COMMERCIAL

## 2022-10-20 VITALS
BODY MASS INDEX: 35.36 KG/M2 | WEIGHT: 220 LBS | TEMPERATURE: 99 F | HEART RATE: 63 BPM | HEIGHT: 66 IN | OXYGEN SATURATION: 97 % | DIASTOLIC BLOOD PRESSURE: 69 MMHG | SYSTOLIC BLOOD PRESSURE: 113 MMHG | RESPIRATION RATE: 20 BRPM

## 2022-10-20 VITALS
OXYGEN SATURATION: 95 % | RESPIRATION RATE: 18 BRPM | SYSTOLIC BLOOD PRESSURE: 146 MMHG | HEIGHT: 66 IN | DIASTOLIC BLOOD PRESSURE: 72 MMHG | WEIGHT: 220 LBS | BODY MASS INDEX: 35.36 KG/M2 | HEART RATE: 62 BPM | TEMPERATURE: 98 F

## 2022-10-20 DIAGNOSIS — R07.9 RIGHT-SIDED CHEST PAIN: ICD-10-CM

## 2022-10-20 DIAGNOSIS — I82.431 ACUTE DEEP VEIN THROMBOSIS (DVT) OF POPLITEAL VEIN OF RIGHT LOWER EXTREMITY: ICD-10-CM

## 2022-10-20 DIAGNOSIS — R07.9 CHEST PAIN, UNSPECIFIED TYPE: Primary | ICD-10-CM

## 2022-10-20 DIAGNOSIS — R07.9 CHEST PAIN: Primary | ICD-10-CM

## 2022-10-20 DIAGNOSIS — R07.89 CHEST DISCOMFORT: ICD-10-CM

## 2022-10-20 LAB
ALBUMIN SERPL BCP-MCNC: 3.8 G/DL (ref 3.5–5.2)
ALP SERPL-CCNC: 99 U/L (ref 55–135)
ALT SERPL W/O P-5'-P-CCNC: 12 U/L (ref 10–44)
ANION GAP SERPL CALC-SCNC: 8 MMOL/L (ref 8–16)
AST SERPL-CCNC: 17 U/L (ref 10–40)
BASOPHILS # BLD AUTO: 0.03 K/UL (ref 0–0.2)
BASOPHILS NFR BLD: 0.4 % (ref 0–1.9)
BILIRUB SERPL-MCNC: 0.2 MG/DL (ref 0.1–1)
BNP SERPL-MCNC: 23 PG/ML (ref 0–99)
BUN SERPL-MCNC: 15 MG/DL (ref 8–23)
BUN SERPL-MCNC: 16 MG/DL (ref 6–30)
CALCIUM SERPL-MCNC: 9.7 MG/DL (ref 8.7–10.5)
CHLORIDE SERPL-SCNC: 101 MMOL/L (ref 95–110)
CHLORIDE SERPL-SCNC: 102 MMOL/L (ref 95–110)
CO2 SERPL-SCNC: 28 MMOL/L (ref 23–29)
CREAT SERPL-MCNC: 1 MG/DL (ref 0.5–1.4)
CREAT SERPL-MCNC: 1.1 MG/DL (ref 0.5–1.4)
DIFFERENTIAL METHOD: ABNORMAL
EOSINOPHIL # BLD AUTO: 0.3 K/UL (ref 0–0.5)
EOSINOPHIL NFR BLD: 3 % (ref 0–8)
ERYTHROCYTE [DISTWIDTH] IN BLOOD BY AUTOMATED COUNT: 14.7 % (ref 11.5–14.5)
EST. GFR  (NO RACE VARIABLE): 58 ML/MIN/1.73 M^2
GLUCOSE SERPL-MCNC: 101 MG/DL (ref 70–110)
GLUCOSE SERPL-MCNC: 109 MG/DL (ref 70–110)
HCT VFR BLD AUTO: 41.7 % (ref 37–48.5)
HCT VFR BLD CALC: 40 %PCV (ref 36–54)
HGB BLD-MCNC: 13.1 G/DL (ref 12–16)
HIV 1+2 AB+HIV1 P24 AG SERPL QL IA: NORMAL
IMM GRANULOCYTES # BLD AUTO: 0.02 K/UL (ref 0–0.04)
IMM GRANULOCYTES NFR BLD AUTO: 0.2 % (ref 0–0.5)
INR PPP: 1.2 (ref 0.8–1.2)
LYMPHOCYTES # BLD AUTO: 3 K/UL (ref 1–4.8)
LYMPHOCYTES NFR BLD: 35.1 % (ref 18–48)
MCH RBC QN AUTO: 25.8 PG (ref 27–31)
MCHC RBC AUTO-ENTMCNC: 31.4 G/DL (ref 32–36)
MCV RBC AUTO: 82 FL (ref 82–98)
MONOCYTES # BLD AUTO: 0.8 K/UL (ref 0.3–1)
MONOCYTES NFR BLD: 8.9 % (ref 4–15)
NEUTROPHILS # BLD AUTO: 4.4 K/UL (ref 1.8–7.7)
NEUTROPHILS NFR BLD: 52.4 % (ref 38–73)
NRBC BLD-RTO: 0 /100 WBC
PLATELET # BLD AUTO: 364 K/UL (ref 150–450)
PMV BLD AUTO: 10.7 FL (ref 9.2–12.9)
POC IONIZED CALCIUM: 1.22 MMOL/L (ref 1.06–1.42)
POC TCO2 (MEASURED): 28 MMOL/L (ref 23–29)
POTASSIUM BLD-SCNC: 4 MMOL/L (ref 3.5–5.1)
POTASSIUM SERPL-SCNC: 4 MMOL/L (ref 3.5–5.1)
PROT SERPL-MCNC: 7.9 G/DL (ref 6–8.4)
PROTHROMBIN TIME: 12.6 SEC (ref 9–12.5)
RBC # BLD AUTO: 5.07 M/UL (ref 4–5.4)
SAMPLE: NORMAL
SODIUM BLD-SCNC: 140 MMOL/L (ref 136–145)
SODIUM SERPL-SCNC: 138 MMOL/L (ref 136–145)
TROPONIN I SERPL DL<=0.01 NG/ML-MCNC: 0.01 NG/ML (ref 0–0.03)
WBC # BLD AUTO: 8.41 K/UL (ref 3.9–12.7)

## 2022-10-20 PROCEDURE — 99214 OFFICE O/P EST MOD 30 MIN: CPT | Mod: S$GLB,,, | Performed by: FAMILY MEDICINE

## 2022-10-20 PROCEDURE — 71045 XR CHEST 1 VIEW: ICD-10-PCS | Mod: S$GLB,,, | Performed by: RADIOLOGY

## 2022-10-20 PROCEDURE — 80047 BASIC METABLC PNL IONIZED CA: CPT | Mod: 59

## 2022-10-20 PROCEDURE — 3078F PR MOST RECENT DIASTOLIC BLOOD PRESSURE < 80 MM HG: ICD-10-PCS | Mod: CPTII,S$GLB,, | Performed by: FAMILY MEDICINE

## 2022-10-20 PROCEDURE — 1159F PR MEDICATION LIST DOCUMENTED IN MEDICAL RECORD: ICD-10-PCS | Mod: CPTII,S$GLB,, | Performed by: FAMILY MEDICINE

## 2022-10-20 PROCEDURE — 71045 X-RAY EXAM CHEST 1 VIEW: CPT | Mod: S$GLB,,, | Performed by: RADIOLOGY

## 2022-10-20 PROCEDURE — 99214 PR OFFICE/OUTPT VISIT, EST, LEVL IV, 30-39 MIN: ICD-10-PCS | Mod: S$GLB,,, | Performed by: FAMILY MEDICINE

## 2022-10-20 PROCEDURE — 1159F MED LIST DOCD IN RCRD: CPT | Mod: CPTII,S$GLB,, | Performed by: FAMILY MEDICINE

## 2022-10-20 PROCEDURE — 93010 EKG 12-LEAD: ICD-10-PCS | Mod: S$GLB,,, | Performed by: INTERNAL MEDICINE

## 2022-10-20 PROCEDURE — 87389 HIV-1 AG W/HIV-1&-2 AB AG IA: CPT | Performed by: PHYSICIAN ASSISTANT

## 2022-10-20 PROCEDURE — 83880 ASSAY OF NATRIURETIC PEPTIDE: CPT | Performed by: PHYSICIAN ASSISTANT

## 2022-10-20 PROCEDURE — 80053 COMPREHEN METABOLIC PANEL: CPT | Performed by: PHYSICIAN ASSISTANT

## 2022-10-20 PROCEDURE — 93005 ELECTROCARDIOGRAM TRACING: CPT | Mod: S$GLB,,, | Performed by: PHYSICIAN ASSISTANT

## 2022-10-20 PROCEDURE — 99284 EMERGENCY DEPT VISIT MOD MDM: CPT | Mod: ,,, | Performed by: PHYSICIAN ASSISTANT

## 2022-10-20 PROCEDURE — 3074F PR MOST RECENT SYSTOLIC BLOOD PRESSURE < 130 MM HG: ICD-10-PCS | Mod: CPTII,S$GLB,, | Performed by: FAMILY MEDICINE

## 2022-10-20 PROCEDURE — 1125F PR PAIN SEVERITY QUANTIFIED, PAIN PRESENT: ICD-10-PCS | Mod: CPTII,S$GLB,, | Performed by: FAMILY MEDICINE

## 2022-10-20 PROCEDURE — 99284 PR EMERGENCY DEPT VISIT,LEVEL IV: ICD-10-PCS | Mod: ,,, | Performed by: PHYSICIAN ASSISTANT

## 2022-10-20 PROCEDURE — 1160F PR REVIEW ALL MEDS BY PRESCRIBER/CLIN PHARMACIST DOCUMENTED: ICD-10-PCS | Mod: CPTII,S$GLB,, | Performed by: FAMILY MEDICINE

## 2022-10-20 PROCEDURE — 1160F RVW MEDS BY RX/DR IN RCRD: CPT | Mod: CPTII,S$GLB,, | Performed by: FAMILY MEDICINE

## 2022-10-20 PROCEDURE — 85025 COMPLETE CBC W/AUTO DIFF WBC: CPT | Performed by: PHYSICIAN ASSISTANT

## 2022-10-20 PROCEDURE — 3074F SYST BP LT 130 MM HG: CPT | Mod: CPTII,S$GLB,, | Performed by: FAMILY MEDICINE

## 2022-10-20 PROCEDURE — 99285 EMERGENCY DEPT VISIT HI MDM: CPT | Mod: 25

## 2022-10-20 PROCEDURE — 84484 ASSAY OF TROPONIN QUANT: CPT | Performed by: PHYSICIAN ASSISTANT

## 2022-10-20 PROCEDURE — 85610 PROTHROMBIN TIME: CPT | Performed by: PHYSICIAN ASSISTANT

## 2022-10-20 PROCEDURE — 25500020 PHARM REV CODE 255: Performed by: EMERGENCY MEDICINE

## 2022-10-20 PROCEDURE — 93005 EKG 12-LEAD: ICD-10-PCS | Mod: S$GLB,,, | Performed by: PHYSICIAN ASSISTANT

## 2022-10-20 PROCEDURE — 1125F AMNT PAIN NOTED PAIN PRSNT: CPT | Mod: CPTII,S$GLB,, | Performed by: FAMILY MEDICINE

## 2022-10-20 PROCEDURE — 3078F DIAST BP <80 MM HG: CPT | Mod: CPTII,S$GLB,, | Performed by: FAMILY MEDICINE

## 2022-10-20 PROCEDURE — 93010 ELECTROCARDIOGRAM REPORT: CPT | Mod: S$GLB,,, | Performed by: INTERNAL MEDICINE

## 2022-10-20 RX ORDER — LIDOCAINE 50 MG/G
1 PATCH TOPICAL DAILY
Qty: 30 PATCH | Refills: 0 | Status: SHIPPED | OUTPATIENT
Start: 2022-10-20

## 2022-10-20 RX ORDER — MORPHINE SULFATE 2 MG/ML
6 INJECTION, SOLUTION INTRAMUSCULAR; INTRAVENOUS
Status: DISCONTINUED | OUTPATIENT
Start: 2022-10-20 | End: 2022-10-20 | Stop reason: HOSPADM

## 2022-10-20 RX ADMIN — IOHEXOL 100 ML: 350 INJECTION, SOLUTION INTRAVENOUS at 07:10

## 2022-10-20 NOTE — PROGRESS NOTES
"Subjective:       Patient ID: Thao Perera is a 76 y.o. female.    Vitals:  height is 5' 6" (1.676 m) and weight is 99.8 kg (220 lb). Her temperature is 98.6 °F (37 °C). Her blood pressure is 113/69 and her pulse is 63. Her respiration is 20 and oxygen saturation is 97%.     Chief Complaint: Chest Pain    76 years old female presents with chest pain since this a.m. pain is constant, moderate in intensity, sharp in character located to center and right side a front and back of chest.  Denies fever, chills, cough shortness of breath.  Patient was diagnosed with right lower extremity DVT 2 weeks ago and has been on xarelto.    Chest Pain   This is a new problem. The current episode started today. The onset quality is sudden. The problem occurs constantly. The problem has been unchanged. The pain is at a severity of 4/10. The quality of the pain is described as dull. The pain radiates to the right shoulder and upper back. Associated symptoms include back pain (RT UPPER  BACK) and lower extremity edema (RT LG SWELLING). Pertinent negatives include no headaches, numbness, palpitations, vomiting or weakness.     Cardiovascular:  Positive for chest pain. Negative for palpitations.   Gastrointestinal:  Negative for vomiting.   Musculoskeletal:  Positive for back pain (RT UPPER  BACK).   Neurological:  Negative for headaches and numbness.     Objective:      Physical Exam   Constitutional: She is oriented to person, place, and time. She appears well-developed. She is cooperative.  Non-toxic appearance. She does not appear ill. No distress.   HENT:   Head: Normocephalic and atraumatic.   Ears:   Right Ear: Hearing, tympanic membrane, external ear and ear canal normal.   Left Ear: Hearing, tympanic membrane, external ear and ear canal normal.   Nose: Nose normal. No mucosal edema, rhinorrhea or nasal deformity. No epistaxis. Right sinus exhibits no maxillary sinus tenderness and no frontal sinus tenderness. Left sinus " exhibits no maxillary sinus tenderness and no frontal sinus tenderness.   Mouth/Throat: Uvula is midline, oropharynx is clear and moist and mucous membranes are normal. No trismus in the jaw. Normal dentition. No uvula swelling. No posterior oropharyngeal erythema.   Eyes: Conjunctivae and lids are normal. Right eye exhibits no discharge. Left eye exhibits no discharge. No scleral icterus.   Neck: Trachea normal and phonation normal. Neck supple.   Cardiovascular: Normal rate, regular rhythm, normal heart sounds and normal pulses.   No murmur heard.     Comments:   Positive right lower leg diffuse swelling   Pulmonary/Chest: Effort normal and breath sounds normal. No stridor. No respiratory distress. She has no wheezes. She has no rhonchi. She has no rales. She exhibits no tenderness.   Abdominal: Normal appearance and bowel sounds are normal. She exhibits no distension and no mass. Soft. There is no abdominal tenderness.   Musculoskeletal: Normal range of motion.         General: No tenderness, deformity or signs of injury. Normal range of motion.   Neurological: She is alert and oriented to person, place, and time. She exhibits normal muscle tone. Coordination normal.   Skin: Skin is warm, dry, intact, not diaphoretic and not pale.   Psychiatric: Her speech is normal and behavior is normal. Judgment and thought content normal.   Nursing note and vitals reviewed.      Assessment:       1. Chest pain, unspecified type    2. Acute deep vein thrombosis (DVT) of popliteal vein of right lower extremity          Plan:       Considering chest pain and recent episode of DVT, patient is referred to ER to rule out PE.  ED at [facility] is notified.  Patient agreed to plan.  Chest pain, unspecified type  -     IN OFFICE EKG 12-LEAD (to Muse)  -     Cancel: X-Ray Chest PA And Lateral; Future; Expected date: 10/20/2022  -     Refer to Emergency Dept.    Acute deep vein thrombosis (DVT) of popliteal vein of right lower  extremity  -     Refer to Emergency Dept.

## 2022-10-20 NOTE — ED NOTES
I-STAT Chem-8+ Results:   Value Reference Range   Sodium 140 136-145 mmol/L   Potassium  4 3.5-5.1 mmol/L   Chloride 101  mmol/L   Ionized Calcium 1.22 1.06-1.42 mmol/L   CO2 (measured) 28 23-29 mmol/L   Glucose 109  mg/dL   BUN 16 6-30 mg/dL   Creatinine 1.1 0.5-1.4 mg/dL   Hematocrit 40 36-54%

## 2022-10-21 NOTE — ED PROVIDER NOTES
"Encounter Date: 10/20/2022       History     Chief Complaint   Patient presents with    Multiple complaints     Had blood clot to r leg on xeralto, sent from , to r/o blood clot in lung     76-year-old female with history of arthritis, recently diagnosed right lower extremity DVT on Xarelto presents to the ED complaining of right-sided chest pain that started this morning.  Pain is a 5-6/10 "sharp" that is worse with movement or deep breath.  No trauma to the chest.  She has been compliant with Xarelto.  She denies fever, chills, abdominal pain, nausea, URI symptoms, headache.    The history is provided by the patient.   Review of patient's allergies indicates:  No Known Allergies  Past Medical History:   Diagnosis Date    Arthritis      Past Surgical History:   Procedure Laterality Date    APPENDECTOMY      CHOLECYSTECTOMY      EYE SURGERY Bilateral     IOL    HIP ARTHROPLASTY Right 9/13/2018    Procedure: ARTHROPLASTY, HIP;  Surgeon: Quincy Reyna MD;  Location: [facility] OR;  Service: Orthopedics;  Laterality: Right;    HYSTERECTOMY      INJECTION OF ANESTHETIC AGENT AROUND NERVE Bilateral 3/3/2021    Procedure: BLOCK, NERVE BILATERAL L3, 4, 5 MEDIAL BRANCH BLOCK;  Surgeon: Joe Schwartz MD;  Location: [facility] PAIN MGT;  Service: Pain Management;  Laterality: Bilateral;  NEEDS CONSENT    INJECTION OF ANESTHETIC AGENT AROUND NERVE Bilateral 4/28/2021    Procedure: BLOCK, NERVE BILATERAL L3, 4, 5 MEDIAL BRANCH BLOCK;  Surgeon: Joe Schwartz MD;  Location: [facility] PAIN MGT;  Service: Pain Management;  Laterality: Bilateral;  NEEDS CONSENT    JOINT REPLACEMENT Left     hip, bilateral knees    TONSILLECTOMY       No family history on file.  Social History     Tobacco Use    Smoking status: Never    Smokeless tobacco: Never   Substance Use Topics    Alcohol use: Yes     Alcohol/week: 1.0 standard drink     Types: 1 Glasses of wine per week     Comment: occasionally    Drug use: No     Review of Systems "   Constitutional:  Negative for chills and fever.   HENT:  Negative for congestion and sore throat.    Respiratory:  Positive for shortness of breath. Negative for cough.    Cardiovascular:  Positive for chest pain and leg swelling.   Gastrointestinal:  Negative for abdominal pain, constipation, diarrhea, nausea and vomiting.   Genitourinary:  Negative for dysuria and hematuria.   Musculoskeletal:  Negative for back pain.   Skin:  Negative for rash.   Neurological:  Negative for weakness and headaches.   Psychiatric/Behavioral:  Negative for confusion.      Physical Exam     Initial Vitals [10/20/22 1627]   BP Pulse Resp Temp SpO2   117/67 (!) 56 18 98.2 °F (36.8 °C) 98 %      MAP       --         Physical Exam    Nursing note and vitals reviewed.  Constitutional: She appears well-developed and well-nourished. She is not diaphoretic. No distress.   HENT:   Head: Normocephalic and atraumatic.   Neck: Neck supple.   Normal range of motion.  Cardiovascular:  Normal rate, regular rhythm and normal heart sounds.     Exam reveals no gallop and no friction rub.       No murmur heard.  R leg edema   Pulmonary/Chest: Breath sounds normal. She has no wheezes. She has no rhonchi. She has no rales. She exhibits no tenderness.   Abdominal: Abdomen is soft. Bowel sounds are normal. There is no abdominal tenderness. There is no rebound and no guarding.   Musculoskeletal:         General: Normal range of motion.      Cervical back: Normal range of motion and neck supple.     Neurological: She is alert and oriented to person, place, and time.   Skin: Skin is warm and dry. No rash noted. No erythema.   Psychiatric: She has a normal mood and affect.       ED Course   Procedures  Labs Reviewed   CBC W/ AUTO DIFFERENTIAL - Abnormal; Notable for the following components:       Result Value    MCH 25.8 (*)     MCHC 31.4 (*)     RDW 14.7 (*)     All other components within normal limits   COMPREHENSIVE METABOLIC PANEL - Abnormal; Notable  for the following components:    eGFR 58.0 (*)     All other components within normal limits   PROTIME-INR - Abnormal; Notable for the following components:    Prothrombin Time 12.6 (*)     All other components within normal limits   HIV 1 / 2 ANTIBODY    Narrative:     Release to patient->Immediate   TROPONIN I   B-TYPE NATRIURETIC PEPTIDE   HEPATITIS C ANTIBODY   ISTAT PROCEDURE   ISTAT CHEM8          Imaging Results               CTA Chest Non-Coronary (PE Studies) (Final result)  Result time 10/20/22 20:52:19      Final result by Daniel Bueno MD (10/20/22 20:52:19)                   Impression:      No evidence of filling defect to suggest pulmonary embolism.    Fusiform ectasia of the ascending aorta measuring 4 cm.    Cardiomegaly.    Bilateral dependent atelectasis.    This report was flagged in Epic as abnormal.    Electronically signed by resident: Kassy Cochran  Date:    10/20/2022  Time:    20:17    Electronically signed by: Daniel Bueno MD  Date:    10/20/2022  Time:    20:52               Narrative:    EXAMINATION:  CTA CHEST NON CORONARY (PE STUDIES)    CLINICAL HISTORY:  Pulmonary embolism (PE) suspected, high prob;    TECHNIQUE:  Low dose axial images, sagittal and coronal reformations were obtained from the thoracic inlet to the lung bases following the IV administration of 100 mL of Omnipaque 350.  Contrast timing was optimized to evaluate the pulmonary arteries.    COMPARISON:  Chest radiograph 10/20/2022    FINDINGS:  Pulmonary vasculature: Satisfactory opacification of the pulmonary arterial system with no filling defect to the segmental level.    Aorta: Left-sided aortic arch.  Fusiform dilatation of the ascending aorta measuring 4 cm.  The descending aorta is normal in caliber.  Mild significant atherosclerosis    Base of Neck: No significant abnormality.    Thoracic soft tissues: Normal.    Heart: Cardiomegaly.  No pericardial effusion.    Lyn/Mediastinum: No pathologic vania  enlargement.    Airways: Patent.    Lungs/Pleura: Motion artifact. Lungs are symmetrically expanded.  Bilateral dependent atelectasis.  Juxta fissural nodes.  No pleural effusion or thickening.    Esophagus: Normal.    Upper Abdomen: No abnormality of the partially imaged upper abdomen.  Post cholecystectomy.    Bones: No acute fracture. No suspicious lytic or sclerotic lesions.                                       Medications   morphine injection 6 mg (has no administration in time range)   iohexoL (OMNIPAQUE 350) injection 100 mL (100 mLs Intravenous Given 10/20/22 1958)     Medical Decision Making:   History:   Old Medical Records: I decided to obtain old medical records.  Old Records Summarized: records from previous admission(s) and records from clinic visits.       <> Summary of Records: 10/3/22 - seen in the ED at outside hospital, diagnosed with right lower extremity DVT.  Started on Xarelto.  Urgent care visit today complaining of chest pain, sent to the ED for evaluation.  Clinical Tests:   Lab Tests: Reviewed and Ordered  Radiological Study: Ordered and Reviewed  Medical Tests: Reviewed and Ordered     APC / Resident Notes:   76-year-old female with history of arthritis, recently diagnosed right lower extremity DVT on Xarelto presents to the ED complaining of right-sided chest pain that started this morning.  Vital signs stable.  Well-appearing.  No chest wall tenderness.  Right lower extremity edema noted.  Abdomen is nontender.  Differential diagnosis includes but is not limited to PE, musculoskeletal pain, ACS, pneumonia.  Will get labs, CTA.    No leukocytosis or anemia.  CMP unremarkable.  Troponin normal. BNP normal.     CTA with no evidence of PE. Fusiform ectasia of the ascending aorta noted.     Pain likely MSK. She declined pain medication in the ED and states if she stays still she does not have any pain.    I do not feel that she needs any further labs or imaging at this time. Stable for  discharge.     She was discharged with a prescription for lidoderm patches.  She will follow up with her cardiologist.  Strict ED return precautions given.  All of the patient's questions were answered.  I reviewed the patient's chart, labs, and imaging and discussed the case with my supervising physician.                        Clinical Impression:   Final diagnoses:  [R07.89] Chest discomfort  [R07.9] Chest pain (Primary)  [R07.9] Right-sided chest pain        ED Disposition Condition    Discharge Stable          ED Prescriptions       Medication Sig Dispense Start Date End Date Auth. Provider    LIDOcaine (LIDODERM) 5 % Place 1 patch onto the skin once daily. Remove & Discard patch within 12 hours or as directed by MD 30 patch 10/20/2022 -- Stella Cassidy PA-C          Follow-up Information       Follow up With Specialties Details Why Contact Info    Edmond Taylor MD Family Medicine Schedule an appointment as soon as possible for a visit   [address] [facility] [address]  [phone]      Yogi Gomes MD Cardiology   [address] [place] [address]  [id number]  [facility] [address]  [phone]               Stella Cassidy PA-C  10/20/22 2118

## 2022-11-09 ENCOUNTER — OFFICE VISIT (OUTPATIENT)
Dept: ORTHOPEDICS | Facility: CLINIC | Age: 77
End: 2022-11-09
Payer: COMMERCIAL

## 2022-11-09 VITALS
HEIGHT: 66 IN | DIASTOLIC BLOOD PRESSURE: 70 MMHG | WEIGHT: 240 LBS | BODY MASS INDEX: 38.57 KG/M2 | SYSTOLIC BLOOD PRESSURE: 106 MMHG

## 2022-11-09 DIAGNOSIS — M48.061 LUMBAR FORAMINAL STENOSIS: ICD-10-CM

## 2022-11-09 DIAGNOSIS — M51.36 DDD (DEGENERATIVE DISC DISEASE), LUMBAR: Primary | ICD-10-CM

## 2022-11-09 DIAGNOSIS — M47.816 LUMBAR FACET ARTHROPATHY: ICD-10-CM

## 2022-11-09 PROCEDURE — 3078F PR MOST RECENT DIASTOLIC BLOOD PRESSURE < 80 MM HG: ICD-10-PCS | Mod: CPTII,S$GLB,, | Performed by: ORTHOPAEDIC SURGERY

## 2022-11-09 PROCEDURE — 1125F PR PAIN SEVERITY QUANTIFIED, PAIN PRESENT: ICD-10-PCS | Mod: CPTII,S$GLB,, | Performed by: ORTHOPAEDIC SURGERY

## 2022-11-09 PROCEDURE — 3288F FALL RISK ASSESSMENT DOCD: CPT | Mod: CPTII,S$GLB,, | Performed by: ORTHOPAEDIC SURGERY

## 2022-11-09 PROCEDURE — 1160F PR REVIEW ALL MEDS BY PRESCRIBER/CLIN PHARMACIST DOCUMENTED: ICD-10-PCS | Mod: CPTII,S$GLB,, | Performed by: ORTHOPAEDIC SURGERY

## 2022-11-09 PROCEDURE — 1101F PT FALLS ASSESS-DOCD LE1/YR: CPT | Mod: CPTII,S$GLB,, | Performed by: ORTHOPAEDIC SURGERY

## 2022-11-09 PROCEDURE — 1159F PR MEDICATION LIST DOCUMENTED IN MEDICAL RECORD: ICD-10-PCS | Mod: CPTII,S$GLB,, | Performed by: ORTHOPAEDIC SURGERY

## 2022-11-09 PROCEDURE — 1160F RVW MEDS BY RX/DR IN RCRD: CPT | Mod: CPTII,S$GLB,, | Performed by: ORTHOPAEDIC SURGERY

## 2022-11-09 PROCEDURE — 99213 PR OFFICE/OUTPT VISIT, EST, LEVL III, 20-29 MIN: ICD-10-PCS | Mod: S$GLB,,, | Performed by: ORTHOPAEDIC SURGERY

## 2022-11-09 PROCEDURE — 99213 OFFICE O/P EST LOW 20 MIN: CPT | Mod: S$GLB,,, | Performed by: ORTHOPAEDIC SURGERY

## 2022-11-09 PROCEDURE — 3074F SYST BP LT 130 MM HG: CPT | Mod: CPTII,S$GLB,, | Performed by: ORTHOPAEDIC SURGERY

## 2022-11-09 PROCEDURE — 3074F PR MOST RECENT SYSTOLIC BLOOD PRESSURE < 130 MM HG: ICD-10-PCS | Mod: CPTII,S$GLB,, | Performed by: ORTHOPAEDIC SURGERY

## 2022-11-09 PROCEDURE — 3288F PR FALLS RISK ASSESSMENT DOCUMENTED: ICD-10-PCS | Mod: CPTII,S$GLB,, | Performed by: ORTHOPAEDIC SURGERY

## 2022-11-09 PROCEDURE — 1101F PR PT FALLS ASSESS DOC 0-1 FALLS W/OUT INJ PAST YR: ICD-10-PCS | Mod: CPTII,S$GLB,, | Performed by: ORTHOPAEDIC SURGERY

## 2022-11-09 PROCEDURE — 1125F AMNT PAIN NOTED PAIN PRSNT: CPT | Mod: CPTII,S$GLB,, | Performed by: ORTHOPAEDIC SURGERY

## 2022-11-09 PROCEDURE — 3078F DIAST BP <80 MM HG: CPT | Mod: CPTII,S$GLB,, | Performed by: ORTHOPAEDIC SURGERY

## 2022-11-09 PROCEDURE — 1159F MED LIST DOCD IN RCRD: CPT | Mod: CPTII,S$GLB,, | Performed by: ORTHOPAEDIC SURGERY

## 2022-11-09 RX ORDER — HYDROCODONE BITARTRATE AND ACETAMINOPHEN 5; 325 MG/1; MG/1
1 TABLET ORAL DAILY PRN
COMMUNITY
Start: 2022-11-07

## 2022-11-09 RX ORDER — RIVAROXABAN 15 MG/1
15 TABLET, FILM COATED ORAL 2 TIMES DAILY
COMMUNITY
Start: 2022-11-07

## 2022-11-09 NOTE — PROGRESS NOTES
Subjective:       Patient ID: Thao Perera is a 76 y.o. female.    Chief Complaint: Pain of the Lumbar Spine (Patient went to see , he prescribed her pain medicine but she does not want to take them.)      History of Present Illness    Prior to meeting with the patient I reviewed the medical chart in [facility]. This included reviewing the previous progress notes from our office, review of the patient's last appointment with their primary care provider, review of any visits to the emergency room, and review of any pain management appointments or procedures.   Back and leg symptoms are unchanged.  Was seen by Dr. Zaldivar for pain management and because of severe pain prescribed hydrocodone.  The patient was was reluctant to take medication and her pain has actually diminished but she still is interested in going forward with pain management injections.    Current Medications  Current Outpatient Medications   Medication Sig Dispense Refill    acetaminophen (TYLENOL) 500 MG tablet Take 500 mg by mouth.      BREO ELLIPTA 100-25 mcg/dose diskus inhaler       cefUROXime (CEFTIN) 500 MG tablet Take 500 mg by mouth 2 (two) times daily.      donepeziL (ARICEPT) 5 MG tablet Take 5 mg by mouth.      furosemide (LASIX) 20 MG tablet Take 20 mg by mouth once daily.      HYDROcodone-acetaminophen (NORCO) 5-325 mg per tablet Take 1 tablet by mouth daily as needed.      pregabalin (LYRICA) 75 MG capsule Take 75 mg by mouth.      XARELTO 15 mg Tab Take 15 mg by mouth 2 (two) times daily.      aspirin 325 MG tablet Take 325 mg by mouth once daily.      LIDOcaine (LIDODERM) 5 % Place 1 patch onto the skin once daily. Remove & Discard patch within 12 hours or as directed by MD (Patient not taking: Reported on 11/9/2022) 30 patch 0     No current facility-administered medications for this visit.     Facility-Administered Medications Ordered in Other Visits   Medication Dose Route Frequency Provider Last Rate Last Admin    sodium  chloride 0.9% flush 3 mL  3 mL Intravenous PRN Quincy Reyna MD           Allergies  Review of patient's allergies indicates:  No Known Allergies    Past Medical History  Past Medical History:   Diagnosis Date    Arthritis        Surgical History  Past Surgical History:   Procedure Laterality Date    APPENDECTOMY      CHOLECYSTECTOMY      EYE SURGERY Bilateral     IOL    HIP ARTHROPLASTY Right 9/13/2018    Procedure: ARTHROPLASTY, HIP;  Surgeon: Quincy Reyna MD;  Location: [facility] OR;  Service: Orthopedics;  Laterality: Right;    HYSTERECTOMY      INJECTION OF ANESTHETIC AGENT AROUND NERVE Bilateral 3/3/2021    Procedure: BLOCK, NERVE BILATERAL L3, 4, 5 MEDIAL BRANCH BLOCK;  Surgeon: Joe Schwartz MD;  Location: [facility] PAIN MGT;  Service: Pain Management;  Laterality: Bilateral;  NEEDS CONSENT    INJECTION OF ANESTHETIC AGENT AROUND NERVE Bilateral 4/28/2021    Procedure: BLOCK, NERVE BILATERAL L3, 4, 5 MEDIAL BRANCH BLOCK;  Surgeon: Joe Schwartz MD;  Location: [facility] PAIN MGT;  Service: Pain Management;  Laterality: Bilateral;  NEEDS CONSENT    JOINT REPLACEMENT Left     hip, bilateral knees    TONSILLECTOMY         Family History:   History reviewed. No pertinent family history.    Social History:   Social History     Socioeconomic History    Marital status: Single   Tobacco Use    Smoking status: Never    Smokeless tobacco: Never   Substance and Sexual Activity    Alcohol use: Yes     Alcohol/week: 1.0 standard drink     Types: 1 Glasses of wine per week     Comment: occasionally    Drug use: No       Hospitalization/Major Diagnostic Procedure:     Review of Systems     General/Constitutional:  Chills denies. Fatigue denies. Fever denies. Weight gain denies. Weight loss denies.    Respiratory:  Shortness of breath denies.    Cardiovascular:  Chest pain denies.    Gastrointestinal:  Constipation denies. Diarrhea denies. Nausea denies. Vomiting denies.     Hematology:  Easy bruising denies.  Prolonged bleeding denies.     Genitourinary:  Frequent urination denies. Pain in lower back denies. Painful urination denies.     Musculoskeletal:  See HPI for details    Skin:  Rash denies.    Neurologic:  Dizziness denies. Gait abnormalities denies. Seizures denies. Tingling/Numbess denies.    Psychiatric:  Anxiety denies. Depressed mood denies.     Objective:   Vital Signs:   Vitals:    11/09/22 1104   BP: 106/70        Physical Exam      General Examination:     Constitutional: The patient is alert and oriented to lace person and time. Mood is pleasant.     Head/Face: Normal facial features normal eyebrows    Eyes: Normal extraocular motion bilaterally    Lungs: Respirations are equal and unlabored    Gait is coordinated.    Cardiovascular: There are no swelling or varicosities present.    Lymphatic: Negative for adenopathy    Skin: Normal    Neurological: Level of consciousness normal. Oriented to place person and time and situation    Psychiatric: Oriented to time place person and situation    Exam unchanged pain with extension and bending pain with facet joint loading maneuvers.  XRAY Report/ Interpretation:  No new x-rays today      Assessment:       1. DDD (degenerative disc disease), lumbar    2. Lumbar facet arthropathy    3. Lumbar foraminal stenosis        Plan:       Thao was seen today for pain.    Diagnoses and all orders for this visit:    DDD (degenerative disc disease), lumbar    Lumbar facet arthropathy    Lumbar foraminal stenosis       Follow up in about 2 months (around 1/9/2023) for lumbar f/u.    The natural history of the condition was discussed and treatment options.  After a further clarity she plans on returning to see the pain management doctor to undergo lumbar medial branch blocks with possible rhizotomy return following the procedure  Treatment options were discussed with regards to the nature of the medical condition. Conservative pain intervention and surgical options were  discussed in detail. The probability of success of each separate treatment option was discussed. The patient expressed a clear understanding of the treatment options. With regards to surgery, the procedure risk, benefits, complications, and outcomes were discussed. No guarantees were given with regards to surgical outcome.   The risk of complications, morbidity, and mortality of patient management decisions have been made at the time of this visit. These are associated with the patient's problems, diagnostic procedures and treatment options. This includes the possible management options selected and those considered but not selected by the patient after shared medical decision making we discussed with the patient.     This note was created using Dragon voice recognition software that occasionally misinterpreted phrases or words.

## 2022-12-01 ENCOUNTER — HOSPITAL ENCOUNTER (OUTPATIENT)
Dept: RADIOLOGY | Facility: HOSPITAL | Age: 77
Discharge: HOME | End: 2022-12-01
Attending: ORTHOPAEDIC SURGERY
Payer: COMMERCIAL

## 2022-12-01 DIAGNOSIS — M51.36 DDD (DEGENERATIVE DISC DISEASE), LUMBAR: ICD-10-CM

## 2022-12-01 PROCEDURE — 72110 X-RAY EXAM L-2 SPINE 4/>VWS: CPT | Mod: TC

## 2022-12-01 PROCEDURE — 72110 X-RAY EXAM L-2 SPINE 4/>VWS: CPT | Mod: 26,,, | Performed by: STUDENT IN AN ORGANIZED HEALTH CARE EDUCATION/TRAINING PROGRAM

## 2022-12-01 PROCEDURE — 72110 XR LUMBAR SPINE AP AND LAT WITH FLEX/EXT: ICD-10-PCS | Mod: 26,,, | Performed by: STUDENT IN AN ORGANIZED HEALTH CARE EDUCATION/TRAINING PROGRAM

## 2022-12-05 ENCOUNTER — OFFICE VISIT (OUTPATIENT)
Dept: ORTHOPEDICS | Facility: CLINIC | Age: 77
End: 2022-12-05
Payer: COMMERCIAL

## 2022-12-05 VITALS — BODY MASS INDEX: 39.37 KG/M2 | HEIGHT: 66 IN | WEIGHT: 245 LBS

## 2022-12-05 DIAGNOSIS — M54.16 LUMBAR RADICULOPATHY: ICD-10-CM

## 2022-12-05 DIAGNOSIS — M43.10 SPONDYLOLISTHESIS, ACQUIRED: Primary | ICD-10-CM

## 2022-12-05 PROCEDURE — 99999 PR PBB SHADOW E&M-EST. PATIENT-LVL III: ICD-10-PCS | Mod: PBBFAC,,, | Performed by: ORTHOPAEDIC SURGERY

## 2022-12-05 PROCEDURE — 1125F AMNT PAIN NOTED PAIN PRSNT: CPT | Mod: CPTII,S$GLB,, | Performed by: ORTHOPAEDIC SURGERY

## 2022-12-05 PROCEDURE — 99204 PR OFFICE/OUTPT VISIT, NEW, LEVL IV, 45-59 MIN: ICD-10-PCS | Mod: S$GLB,,, | Performed by: ORTHOPAEDIC SURGERY

## 2022-12-05 PROCEDURE — 99999 PR PBB SHADOW E&M-EST. PATIENT-LVL III: CPT | Mod: PBBFAC,,, | Performed by: ORTHOPAEDIC SURGERY

## 2022-12-05 PROCEDURE — 1125F PR PAIN SEVERITY QUANTIFIED, PAIN PRESENT: ICD-10-PCS | Mod: CPTII,S$GLB,, | Performed by: ORTHOPAEDIC SURGERY

## 2022-12-05 PROCEDURE — 99204 OFFICE O/P NEW MOD 45 MIN: CPT | Mod: S$GLB,,, | Performed by: ORTHOPAEDIC SURGERY

## 2022-12-05 PROCEDURE — 1159F PR MEDICATION LIST DOCUMENTED IN MEDICAL RECORD: ICD-10-PCS | Mod: CPTII,S$GLB,, | Performed by: ORTHOPAEDIC SURGERY

## 2022-12-05 PROCEDURE — 1159F MED LIST DOCD IN RCRD: CPT | Mod: CPTII,S$GLB,, | Performed by: ORTHOPAEDIC SURGERY

## 2022-12-07 NOTE — PROGRESS NOTES
DATE: 12/7/2022  PATIENT: Thao Perera    Attending Physician: Remington Peraza M.D.    CHIEF COMPLAINT: low back pain    HISTORY:  Thao Perera is a 77 y.o. female pmhx  obesity (BMI 39), recent lower extremity DVT (on xarelto 15 BID), bladder stimulator, BL CASANDRA, BL TKA, and chronic low back pain here for initial evaluation of low back and right leg pain (Back - 7, Leg - 7). The pain has been present for several years and is recently worse the past few months. The patient describes the pain as aching.  The pain is worse with activity and improved by rest. There is associated numbness and tingling radiating down the right thigh and leg in no specific distribution. There is no subjective weakness. Prior treatments have included tylenol BID, lyrica, PT, MBB L3/4/5 , but no muscle relaxer, MAEVE, or surgery. She feels her mobility is limited by her back pain and she uses a walker to ambulate 2/2 this.    The Patient denies myelopathic symptoms such as handwriting changes or difficulty with buttons/coins/keys. Denies perineal paresthesias, bowel/bladder dysfunction.    PAST MEDICAL/SURGICAL HISTORY:  Past Medical History:   Diagnosis Date    Arthritis      Past Surgical History:   Procedure Laterality Date    APPENDECTOMY      CHOLECYSTECTOMY      EYE SURGERY Bilateral     IOL    HIP ARTHROPLASTY Right 9/13/2018    Procedure: ARTHROPLASTY, HIP;  Surgeon: Quincy Reyna MD;  Location: [facility] OR;  Service: Orthopedics;  Laterality: Right;    HYSTERECTOMY      INJECTION OF ANESTHETIC AGENT AROUND NERVE Bilateral 3/3/2021    Procedure: BLOCK, NERVE BILATERAL L3, 4, 5 MEDIAL BRANCH BLOCK;  Surgeon: Joe Schwartz MD;  Location: [facility] PAIN T;  Service: Pain Management;  Laterality: Bilateral;  NEEDS CONSENT    INJECTION OF ANESTHETIC AGENT AROUND NERVE Bilateral 4/28/2021    Procedure: BLOCK, NERVE BILATERAL L3, 4, 5 MEDIAL BRANCH BLOCK;  Surgeon: Joe Schwartz MD;  Location: [facility] PAIN [facility];  Service: Pain  "Management;  Laterality: Bilateral;  NEEDS CONSENT    JOINT REPLACEMENT Left     hip, bilateral knees    TONSILLECTOMY         Current Medications:   Current Outpatient Medications:     acetaminophen (TYLENOL) 500 MG tablet, Take 500 mg by mouth., Disp: , Rfl:     aspirin 325 MG tablet, Take 325 mg by mouth once daily., Disp: , Rfl:     BREO ELLIPTA 100-25 mcg/dose diskus inhaler, , Disp: , Rfl:     cefUROXime (CEFTIN) 500 MG tablet, Take 500 mg by mouth 2 (two) times daily., Disp: , Rfl:     donepeziL (ARICEPT) 5 MG tablet, Take 5 mg by mouth., Disp: , Rfl:     furosemide (LASIX) 20 MG tablet, Take 20 mg by mouth once daily., Disp: , Rfl:     HYDROcodone-acetaminophen (NORCO) 5-325 mg per tablet, Take 1 tablet by mouth daily as needed., Disp: , Rfl:     LIDOcaine (LIDODERM) 5 %, Place 1 patch onto the skin once daily. Remove & Discard patch within 12 hours or as directed by MD, Disp: 30 patch, Rfl: 0    pregabalin (LYRICA) 75 MG capsule, Take 75 mg by mouth., Disp: , Rfl:     XARELTO 15 mg Tab, Take 15 mg by mouth 2 (two) times daily., Disp: , Rfl:   No current facility-administered medications for this visit.    Facility-Administered Medications Ordered in Other Visits:     sodium chloride 0.9% flush 3 mL, 3 mL, Intravenous, PRN, Quincy Reyna MD    Social History:   Social History     Socioeconomic History    Marital status: Single   Tobacco Use    Smoking status: Never    Smokeless tobacco: Never   Substance and Sexual Activity    Alcohol use: Yes     Alcohol/week: 1.0 standard drink     Types: 1 Glasses of wine per week     Comment: occasionally    Drug use: No        EXAM:  Ht 5' 6" (1.676 m)   Wt 111.1 kg (245 lb)   BMI 39.54 kg/m²     PHYSICAL EXAMINATION:    Gait: Normal station and gait, no difficulty with toe or heel walk.   Skin: Dorsal lumbar skin negative for rashes, lesions, hairy patches and surgical scars. There is  lumbar tenderness to palpation.  Range of motion: Lumbar range of motion is " acceptable.  Spinal Balance: Global saggital and coronal spinal balance acceptable, no significant for scoliosis and kyphosis.  Musculoskeletal: No pain with the range of motion of the bilateral hips. No trochanteric tenderness to palpation.  Vascular: Bilateral lower extremities warm and well perfused, Dorsalis pedis pulses 2+ bilaterally.  Neurological: Normal strength and tone in all major motor groups in the bilateral lower extremities. Normal sensation to light touch in the L2-S1 dermatomes bilaterally.  Deep tendon reflexes symmetric  in the bilateral lower extremities.  Negative Babinski bilaterally. Straight leg raise negative bilaterally.    IMAGING:      Today I personally reviewed AP, Lat and Flex/Ex  upright L-spine that demonstrate multilevel DDD most prominent at L2/3 where there is R>L disc height loss resulting in levocurvature of the lumbar spine. grade 1 (7mm) anterolisthesis of L4 on L5.  No dynamic instability.     MRI lumbar spine from 2020 shows multilevel degenerative changes, moderate central stenosis at L2/3, moderate foraminal stenosis     Body mass index is 39.54 kg/m².  No results found for: HGBA1C    ASSESSMENT/PLAN:    Thao was seen today for pain.    Diagnoses and all orders for this visit:    Spondylolisthesis, acquired  -     Procedure Order to Pain Management; Future    Lumbar radiculopathy  -     Procedure Order to Pain Management; Future      No follow-ups on file.    I discussed the natural history of their diagnoses as well as surgical and nonsurgical treatment options. I educated the patient on the importance of core/back strengthening, correct posture, bending/lifting ergonomics, and low-impact aerobic exercises (walking, elliptical, and aquatherapy). She has tried medications, PT, L3/4/5 MBB. Recommend weight loss. Will refer to pain management for L4/5 bilateral epidural.

## 2022-12-13 ENCOUNTER — TELEPHONE (OUTPATIENT)
Dept: ADMINISTRATIVE | Facility: OTHER | Age: 77
End: 2022-12-13
Payer: COMMERCIAL

## 2022-12-14 ENCOUNTER — TELEPHONE (OUTPATIENT)
Dept: ADMINISTRATIVE | Facility: OTHER | Age: 77
End: 2022-12-14
Payer: COMMERCIAL

## 2022-12-14 ENCOUNTER — TELEPHONE (OUTPATIENT)
Dept: PAIN MEDICINE | Facility: OTHER | Age: 77
End: 2022-12-14
Payer: COMMERCIAL

## 2022-12-20 ENCOUNTER — TELEPHONE (OUTPATIENT)
Dept: ADMINISTRATIVE | Facility: OTHER | Age: 77
End: 2022-12-20
Payer: COMMERCIAL

## 2023-01-06 ENCOUNTER — HOSPITAL ENCOUNTER (OUTPATIENT)
Facility: OTHER | Age: 78
Discharge: HOME | End: 2023-01-06
Attending: ANESTHESIOLOGY | Admitting: ANESTHESIOLOGY
Payer: COMMERCIAL

## 2023-01-06 VITALS
SYSTOLIC BLOOD PRESSURE: 146 MMHG | BODY MASS INDEX: 38.89 KG/M2 | WEIGHT: 242 LBS | HEART RATE: 57 BPM | TEMPERATURE: 98 F | OXYGEN SATURATION: 97 % | DIASTOLIC BLOOD PRESSURE: 78 MMHG | RESPIRATION RATE: 16 BRPM | HEIGHT: 66 IN

## 2023-01-06 DIAGNOSIS — M51.36 DISC DEGENERATION, LUMBAR: ICD-10-CM

## 2023-01-06 DIAGNOSIS — M51.16 LUMBAR DISC HERNIATION WITH RADICULOPATHY: Primary | ICD-10-CM

## 2023-01-06 DIAGNOSIS — G89.29 CHRONIC PAIN: ICD-10-CM

## 2023-01-06 PROCEDURE — 64483 NJX AA&/STRD TFRM EPI L/S 1: CPT | Mod: 50,,, | Performed by: ANESTHESIOLOGY

## 2023-01-06 PROCEDURE — 64483 PR EPIDURAL INJ, ANES/STEROID, TRANSFORAMINAL, LUMB/SACR, SNGL LEVL: ICD-10-PCS | Mod: 50,,, | Performed by: ANESTHESIOLOGY

## 2023-01-06 PROCEDURE — 25000003 PHARM REV CODE 250: Performed by: ANESTHESIOLOGY

## 2023-01-06 PROCEDURE — 63600175 PHARM REV CODE 636 W HCPCS: Performed by: ANESTHESIOLOGY

## 2023-01-06 PROCEDURE — 25000003 PHARM REV CODE 250: Performed by: STUDENT IN AN ORGANIZED HEALTH CARE EDUCATION/TRAINING PROGRAM

## 2023-01-06 PROCEDURE — 64483 NJX AA&/STRD TFRM EPI L/S 1: CPT | Mod: 50 | Performed by: ANESTHESIOLOGY

## 2023-01-06 PROCEDURE — 25500020 PHARM REV CODE 255: Performed by: ANESTHESIOLOGY

## 2023-01-06 RX ORDER — LIDOCAINE HYDROCHLORIDE 10 MG/ML
INJECTION, SOLUTION EPIDURAL; INFILTRATION; INTRACAUDAL; PERINEURAL
Status: DISCONTINUED | OUTPATIENT
Start: 2023-01-06 | End: 2023-01-06 | Stop reason: HOSPADM

## 2023-01-06 RX ORDER — MIDAZOLAM HYDROCHLORIDE 1 MG/ML
INJECTION INTRAMUSCULAR; INTRAVENOUS
Status: DISCONTINUED | OUTPATIENT
Start: 2023-01-06 | End: 2023-01-06 | Stop reason: HOSPADM

## 2023-01-06 RX ORDER — SODIUM CHLORIDE 9 MG/ML
500 INJECTION, SOLUTION INTRAVENOUS CONTINUOUS
Status: ACTIVE | OUTPATIENT
Start: 2023-01-06 | End: 2023-01-07

## 2023-01-06 RX ORDER — DEXAMETHASONE SODIUM PHOSPHATE 10 MG/ML
INJECTION INTRAMUSCULAR; INTRAVENOUS
Status: DISCONTINUED | OUTPATIENT
Start: 2023-01-06 | End: 2023-01-06 | Stop reason: HOSPADM

## 2023-01-06 RX ORDER — LIDOCAINE HYDROCHLORIDE 20 MG/ML
INJECTION, SOLUTION INFILTRATION; PERINEURAL
Status: DISCONTINUED | OUTPATIENT
Start: 2023-01-06 | End: 2023-01-06 | Stop reason: HOSPADM

## 2023-01-06 RX ORDER — FENTANYL CITRATE 50 UG/ML
INJECTION, SOLUTION INTRAMUSCULAR; INTRAVENOUS
Status: DISCONTINUED | OUTPATIENT
Start: 2023-01-06 | End: 2023-01-06 | Stop reason: HOSPADM

## 2023-01-06 NOTE — DISCHARGE INSTRUCTIONS

## 2023-01-06 NOTE — OP NOTE
Lumbar Transforaminal Epidural Steroid Injection under Fluoroscopic Guidance    The procedure, risks, benefits, and options were discussed with the patient. There are no contraindications to the procedure. The patent expressed understanding and agreed to the procedure. Informed written consent was obtained prior to the start of the procedure and can be found in the patient's chart.    PATIENT NAME: Thao Perera   MRN: [id number]     DATE OF PROCEDURE: 01/06/2023    PROCEDURE:  Bilateral  L4/5 Lumbar Transforaminal Epidural Steroid Injection under Fluoroscopic Guidance    PRE-OP DIAGNOSIS: Spondylolisthesis, acquired [M43.10]  Lumbar radiculopathy [M54.16] Lumbar radiculopathy [M54.16]    POST-OP DIAGNOSIS: Same    PHYSICIAN: Quirino Saucedo MD    ASSISTANTS: None     MEDICATIONS INJECTED: Preservative-free Decadron 10mg with 5cc of Lidocaine 1% MPF     LOCAL ANESTHETIC INJECTED: Xylocaine 2%     SEDATION: Versed 2mg and Fentanyl 100mcg                                                                                                                                                                                     Conscious sedation ordered by M.D. Patient re-evaluation prior to administration of conscious sedation. No changes noted in patient's status from initial evaluation. The patient's vital signs were monitored by RN and patient remained hemodynamically stable throughout the procedure.    Event Time In   Sedation Start 1325   Sedation End 1332       ESTIMATED BLOOD LOSS: None    COMPLICATIONS: None    TECHNIQUE: Time-out was performed to identify the patient and procedure to be performed. With the patient laying in a prone position, the surgical area was prepped and draped in the usual sterile fashion using ChloraPrep and a fenestrated drape.The levels were determined under fluoroscopy guidance. Skin anesthesia was achieved by injecting Lidocaine 2% over the injection sites. The transforaminal spaces were then  approached with a 22 gauge, 5 inch spinal quinke needle that was introduced under fluoroscopic guidance in the AP and Lateral views. Once the needle tip was in the area of the transforaminal space, and there was no blood, CSF or paraesthesias, contrast dye Omnipaque (240mg/mL) was injected to confirm placement and there was no vascular runoff. Fluoroscopic imaging in the AP and lateral views revealed a clear outline of the spinal nerve with proximal spread of agent through the neural foramen into the epidural space. 3 mL of the medication mixture listed above was injected slowly at each site. Displacement of the radio opaque contrast after injection of the medication confirmed that the medication went into the area of the transforaminal spaces. The needles were removed and bleeding was nil. A sterile dressing was applied. No specimens collected. The patient tolerated the procedure well.       The patient was monitored after the procedure in the recovery area. They were given post-procedure and discharge instructions to follow at home. The patient was discharged in a stable condition.        Quirino Saucedo MD

## 2023-01-06 NOTE — H&P
"HPI  Thao Perera presents for Bilateral L4/5 transforaminal epidural steroid injection        Past Medical History:   Diagnosis Date    Arthritis      Past Surgical History:   Procedure Laterality Date    APPENDECTOMY      CHOLECYSTECTOMY      EYE SURGERY Bilateral     IOL    HIP ARTHROPLASTY Right 9/13/2018    Procedure: ARTHROPLASTY, HIP;  Surgeon: Quincy Reyna MD;  Location: [facility] OR;  Service: Orthopedics;  Laterality: Right;    HYSTERECTOMY      INJECTION OF ANESTHETIC AGENT AROUND NERVE Bilateral 3/3/2021    Procedure: BLOCK, NERVE BILATERAL L3, 4, 5 MEDIAL BRANCH BLOCK;  Surgeon: Joe Schwartz MD;  Location: [facility] PAIN MGT;  Service: Pain Management;  Laterality: Bilateral;  NEEDS CONSENT    INJECTION OF ANESTHETIC AGENT AROUND NERVE Bilateral 4/28/2021    Procedure: BLOCK, NERVE BILATERAL L3, 4, 5 MEDIAL BRANCH BLOCK;  Surgeon: Joe Schwartz MD;  Location: [facility] PAIN MGT;  Service: Pain Management;  Laterality: Bilateral;  NEEDS CONSENT    JOINT REPLACEMENT Left     hip, bilateral knees    TONSILLECTOMY       Review of patient's allergies indicates:  No Known Allergies     PMHx, PSHx, Allergies, Medications reviewed in epic      ROS negative except pain complaints in HPI    OBJECTIVE:    BP (!) 146/72 (BP Location: Right arm, Patient Position: Sitting)   Pulse (!) 52   Temp 98.2 °F (36.8 °C) (Oral)   Resp 16   Ht 5' 6" (1.676 m)   Wt 109.8 kg (242 lb)   SpO2 98%   BMI 39.06 kg/m²     PHYSICAL EXAMINATION:    GENERAL: Well appearing, in no acute distress, alert and oriented x3.  PSYCH:  Mood and affect appropriate.  SKIN: Skin color, texture, turgor normal, no rashes or lesions.  CV: RRR with palpation of the radial artery.  PULM: No evidence of respiratory difficulty, symmetric chest rise. Clear to auscultation.  NEURO: Cranial nerves grossly intact.    Plan:    Proceed with procedure as planned    Benedictgarry Carson  01/06/2023    "

## 2023-01-06 NOTE — DISCHARGE SUMMARY
Discharge Note  Short Stay      SUMMARY     Admit Date: 1/6/2023    Attending Physician: Quirino Saucedo      Discharge Physician: Quirino Saucedo      Discharge Date: 1/6/2023 1:33 PM    Procedure(s) (LRB):  INJECTION, STEROID, EPIDURAL, TRANSFORAMINAL APPROACH, BILATERAL L4-L5 CONTRAST Direct Ref (Bilateral)    Final Diagnosis: Spondylolisthesis, acquired [M43.10]  Lumbar radiculopathy [M54.16]    Disposition: Home or self care    Patient Instructions:   Current Discharge Medication List        CONTINUE these medications which have NOT CHANGED    Details   acetaminophen (TYLENOL) 500 MG tablet Take 500 mg by mouth.      aspirin 325 MG tablet Take 325 mg by mouth once daily.      BREO ELLIPTA 100-25 mcg/dose diskus inhaler       cefUROXime (CEFTIN) 500 MG tablet Take 500 mg by mouth 2 (two) times daily.      donepeziL (ARICEPT) 5 MG tablet Take 5 mg by mouth.      furosemide (LASIX) 20 MG tablet Take 20 mg by mouth once daily.      HYDROcodone-acetaminophen (NORCO) 5-325 mg per tablet Take 1 tablet by mouth daily as needed.      LIDOcaine (LIDODERM) 5 % Place 1 patch onto the skin once daily. Remove & Discard patch within 12 hours or as directed by MD  Qty: 30 patch, Refills: 0      pregabalin (LYRICA) 75 MG capsule Take 75 mg by mouth.      XARELTO 15 mg Tab Take 15 mg by mouth 2 (two) times daily.                 Discharge Diagnosis: Spondylolisthesis, acquired [M43.10]  Lumbar radiculopathy [M54.16]  Condition on Discharge: Stable with no complications to procedure   Diet on Discharge: Same as before.  Activity: as per instruction sheet.  Discharge to: Home with a responsible adult.  Follow up: 2 weeks

## 2023-06-05 ENCOUNTER — TELEPHONE (OUTPATIENT)
Dept: ORTHOPEDICS | Facility: CLINIC | Age: 78
End: 2023-06-05

## 2023-06-05 NOTE — TELEPHONE ENCOUNTER
Called Patient to advise we are unable to prescribe long term narcotic medication. Referred her to her primary care. Patient verbalized understnding

## 2023-06-05 NOTE — TELEPHONE ENCOUNTER
----- Message from Tash Melba sent at 6/5/2023 10:58 AM CDT -----  Regarding: RX Refill  Phone #: [phone]  Patient is requesting a refill of [place]  Pharmacy:   [facility] ([place]) - [place] - [address]  [address] [place] [id number]  Phone: [phone] Fax: [phone]

## 2023-11-06 ENCOUNTER — OFFICE VISIT (OUTPATIENT)
Dept: URGENT CARE | Facility: CLINIC | Age: 78
End: 2023-11-06
Payer: COMMERCIAL

## 2023-11-06 VITALS
HEIGHT: 66 IN | TEMPERATURE: 98 F | HEART RATE: 54 BPM | DIASTOLIC BLOOD PRESSURE: 65 MMHG | OXYGEN SATURATION: 98 % | SYSTOLIC BLOOD PRESSURE: 102 MMHG | BODY MASS INDEX: 38.89 KG/M2 | WEIGHT: 242 LBS | RESPIRATION RATE: 16 BRPM

## 2023-11-06 DIAGNOSIS — U07.1 COVID-19 VIRUS DETECTED: ICD-10-CM

## 2023-11-06 DIAGNOSIS — R05.9 COUGH, UNSPECIFIED TYPE: Primary | ICD-10-CM

## 2023-11-06 DIAGNOSIS — U07.1 COVID: ICD-10-CM

## 2023-11-06 LAB
CTP QC/QA: YES
SARS-COV-2 AG RESP QL IA.RAPID: POSITIVE

## 2023-11-06 PROCEDURE — 87811 SARS-COV-2 COVID19 W/OPTIC: CPT | Mod: QW,S$GLB,, | Performed by: NURSE PRACTITIONER

## 2023-11-06 PROCEDURE — 99212 PR OFFICE/OUTPT VISIT, EST, LEVL II, 10-19 MIN: ICD-10-PCS | Mod: S$GLB,,, | Performed by: NURSE PRACTITIONER

## 2023-11-06 PROCEDURE — 99212 OFFICE O/P EST SF 10 MIN: CPT | Mod: S$GLB,,, | Performed by: NURSE PRACTITIONER

## 2023-11-06 PROCEDURE — 87811 SARS CORONAVIRUS 2 ANTIGEN POCT, MANUAL READ: ICD-10-PCS | Mod: QW,S$GLB,, | Performed by: NURSE PRACTITIONER

## 2023-11-06 NOTE — PROGRESS NOTES
"Subjective:      Patient ID: Thao Perera is a 77 y.o. female.    Vitals:  height is 5' 6" (1.676 m) and weight is 109.8 kg (242 lb). Her oral temperature is 97.8 °F (36.6 °C). Her blood pressure is 102/65 and her pulse is 54 (abnormal). Her respiration is 16 and oxygen saturation is 98%.     Chief Complaint: Fever and Nasal Congestion    Patient is a 77 y.o. female whom is currently confined to a wheelchair reports to the clinic with the compliant of a fever and congestion that presented on this morning, she reports with taking robitussin for her current symptoms and reports with minimal relief.    Fever   This is a new problem. The current episode started today. The problem occurs intermittently. The problem has been gradually worsening. Associated symptoms include congestion and coughing. Treatments tried: robitussin. The treatment provided no relief.   Risk factors: no contaminated food, no contaminated water, no hx of cancer, no immunosuppression, no occupational exposure, no recent sickness, no recent travel and no sick contacts        Constitution: Negative for fatigue and fever.   HENT:  Positive for congestion.    Neck: neck negative.   Respiratory:  Positive for cough. Negative for shortness of breath.       Objective:     Physical Exam   HENT:   Head: Normocephalic.   Nose: Nose normal.   Mouth/Throat: Mucous membranes are moist. No oropharyngeal exudate or posterior oropharyngeal erythema. Oropharynx is clear.   Neck: Neck supple.   Cardiovascular: Normal rate and regular rhythm.   Pulmonary/Chest: Effort normal and breath sounds normal.   Abdominal: Normal appearance.   Neurological: She is alert.   Skin: Skin is warm and dry.       Assessment:     1. Cough, unspecified type    2. COVID        Plan:       Cough, unspecified type  -     SARS Coronavirus 2 Antigen, POCT Manual Read    COVID      Results for orders placed or performed in visit on 11/06/23   SARS Coronavirus 2 Antigen, POCT Manual Read "   Result Value Ref Range    SARS Coronavirus 2 Antigen Positive (A) Negative     Acceptable Yes        Patient Instructions   Instruct on medication  Isolate 5 days form start of symptoms  Wear a mask 10 days  Increase fluids  If experience SOB or worsen symptoms go to ER

## 2023-11-06 NOTE — PATIENT INSTRUCTIONS
Instruct on medication  Isolate 5 days form start of symptoms  Wear a mask 10 days  Increase fluids  If experience SOB or worsen symptoms go to ER

## 2023-11-06 NOTE — PROGRESS NOTES
"Subjective:      Patient ID: Thao Perera is a 77 y.o. female.    Vitals:  height is 5' 6" (1.676 m) and weight is 109.8 kg (242 lb).     Chief Complaint: Fever    Ronny is a 77 y.o. female with the complaint of congestion and fever     Fever     Constitution: Positive for fever.    Objective:     Physical Exam    Assessment:     No diagnosis found.    Plan:       There are no diagnoses linked to this encounter.                "

## 2023-11-07 ENCOUNTER — TELEPHONE (OUTPATIENT)
Dept: URGENT CARE | Facility: CLINIC | Age: 78
End: 2023-11-07
Payer: COMMERCIAL

## 2023-11-07 DIAGNOSIS — U07.1 COVID-19: ICD-10-CM

## 2023-11-07 DIAGNOSIS — U07.1 COVID-19: Primary | ICD-10-CM

## 2023-11-13 ENCOUNTER — TELEPHONE (OUTPATIENT)
Dept: PRIMARY CARE CLINIC | Facility: CLINIC | Age: 78
End: 2023-11-13
Payer: COMMERCIAL

## 2023-11-13 ENCOUNTER — OFFICE VISIT (OUTPATIENT)
Dept: PRIMARY CARE CLINIC | Facility: CLINIC | Age: 78
End: 2023-11-13
Payer: COMMERCIAL

## 2023-11-13 VITALS
OXYGEN SATURATION: 96 % | SYSTOLIC BLOOD PRESSURE: 124 MMHG | WEIGHT: 230.63 LBS | HEART RATE: 59 BPM | BODY MASS INDEX: 36.2 KG/M2 | HEIGHT: 67 IN | DIASTOLIC BLOOD PRESSURE: 72 MMHG

## 2023-11-13 DIAGNOSIS — U07.1 COVID-19: Primary | ICD-10-CM

## 2023-11-13 PROCEDURE — 99213 OFFICE O/P EST LOW 20 MIN: CPT | Mod: S$GLB,,, | Performed by: STUDENT IN AN ORGANIZED HEALTH CARE EDUCATION/TRAINING PROGRAM

## 2023-11-13 PROCEDURE — 1125F AMNT PAIN NOTED PAIN PRSNT: CPT | Mod: CPTII,S$GLB,, | Performed by: STUDENT IN AN ORGANIZED HEALTH CARE EDUCATION/TRAINING PROGRAM

## 2023-11-13 PROCEDURE — 99999 PR PBB SHADOW E&M-EST. PATIENT-LVL IV: ICD-10-PCS | Mod: PBBFAC,,, | Performed by: STUDENT IN AN ORGANIZED HEALTH CARE EDUCATION/TRAINING PROGRAM

## 2023-11-13 PROCEDURE — 1101F PR PT FALLS ASSESS DOC 0-1 FALLS W/OUT INJ PAST YR: ICD-10-PCS | Mod: CPTII,S$GLB,, | Performed by: STUDENT IN AN ORGANIZED HEALTH CARE EDUCATION/TRAINING PROGRAM

## 2023-11-13 PROCEDURE — 99999 PR PBB SHADOW E&M-EST. PATIENT-LVL IV: CPT | Mod: PBBFAC,,, | Performed by: STUDENT IN AN ORGANIZED HEALTH CARE EDUCATION/TRAINING PROGRAM

## 2023-11-13 PROCEDURE — 99213 PR OFFICE/OUTPT VISIT, EST, LEVL III, 20-29 MIN: ICD-10-PCS | Mod: S$GLB,,, | Performed by: STUDENT IN AN ORGANIZED HEALTH CARE EDUCATION/TRAINING PROGRAM

## 2023-11-13 PROCEDURE — 1160F RVW MEDS BY RX/DR IN RCRD: CPT | Mod: CPTII,S$GLB,, | Performed by: STUDENT IN AN ORGANIZED HEALTH CARE EDUCATION/TRAINING PROGRAM

## 2023-11-13 PROCEDURE — 1159F MED LIST DOCD IN RCRD: CPT | Mod: CPTII,S$GLB,, | Performed by: STUDENT IN AN ORGANIZED HEALTH CARE EDUCATION/TRAINING PROGRAM

## 2023-11-13 PROCEDURE — 1125F PR PAIN SEVERITY QUANTIFIED, PAIN PRESENT: ICD-10-PCS | Mod: CPTII,S$GLB,, | Performed by: STUDENT IN AN ORGANIZED HEALTH CARE EDUCATION/TRAINING PROGRAM

## 2023-11-13 PROCEDURE — 1160F PR REVIEW ALL MEDS BY PRESCRIBER/CLIN PHARMACIST DOCUMENTED: ICD-10-PCS | Mod: CPTII,S$GLB,, | Performed by: STUDENT IN AN ORGANIZED HEALTH CARE EDUCATION/TRAINING PROGRAM

## 2023-11-13 PROCEDURE — 3288F PR FALLS RISK ASSESSMENT DOCUMENTED: ICD-10-PCS | Mod: CPTII,S$GLB,, | Performed by: STUDENT IN AN ORGANIZED HEALTH CARE EDUCATION/TRAINING PROGRAM

## 2023-11-13 PROCEDURE — 3074F PR MOST RECENT SYSTOLIC BLOOD PRESSURE < 130 MM HG: ICD-10-PCS | Mod: CPTII,S$GLB,, | Performed by: STUDENT IN AN ORGANIZED HEALTH CARE EDUCATION/TRAINING PROGRAM

## 2023-11-13 PROCEDURE — 3078F PR MOST RECENT DIASTOLIC BLOOD PRESSURE < 80 MM HG: ICD-10-PCS | Mod: CPTII,S$GLB,, | Performed by: STUDENT IN AN ORGANIZED HEALTH CARE EDUCATION/TRAINING PROGRAM

## 2023-11-13 PROCEDURE — 3288F FALL RISK ASSESSMENT DOCD: CPT | Mod: CPTII,S$GLB,, | Performed by: STUDENT IN AN ORGANIZED HEALTH CARE EDUCATION/TRAINING PROGRAM

## 2023-11-13 PROCEDURE — 3074F SYST BP LT 130 MM HG: CPT | Mod: CPTII,S$GLB,, | Performed by: STUDENT IN AN ORGANIZED HEALTH CARE EDUCATION/TRAINING PROGRAM

## 2023-11-13 PROCEDURE — 1101F PT FALLS ASSESS-DOCD LE1/YR: CPT | Mod: CPTII,S$GLB,, | Performed by: STUDENT IN AN ORGANIZED HEALTH CARE EDUCATION/TRAINING PROGRAM

## 2023-11-13 PROCEDURE — 1159F PR MEDICATION LIST DOCUMENTED IN MEDICAL RECORD: ICD-10-PCS | Mod: CPTII,S$GLB,, | Performed by: STUDENT IN AN ORGANIZED HEALTH CARE EDUCATION/TRAINING PROGRAM

## 2023-11-13 PROCEDURE — 3078F DIAST BP <80 MM HG: CPT | Mod: CPTII,S$GLB,, | Performed by: STUDENT IN AN ORGANIZED HEALTH CARE EDUCATION/TRAINING PROGRAM

## 2023-11-13 NOTE — TELEPHONE ENCOUNTER
----- Message from Mckenna Frye sent at 11/13/2023  1:05 PM CST -----  Contact: [phone]  Fyi: Patient will be there in about 5 mins however caregiver says she is on a walker and it will take time to get her up to the 3 rd floor to ck in ..  It is also raining pretty bad please call to advise... [phone]

## 2023-11-17 NOTE — PROGRESS NOTES
Subjective:       Patient ID: Thao Perera is a 78 y.o. female.   Chief Complaint: Urgent Care Follow up    PCP: Dr. Edmond Taylor    Patient presents for urgent care follow up. Patient was diagnosed with COVID 11/6/2023 at urgent care. She was prescribed molnupivar which she has completed. Has mild nasal congestion remaining. No cp, sob, difficulty breathing. No current OTC cold medications. No fevers.    Review of Systems   Constitutional:  Negative for fatigue and fever.   HENT:  Positive for sinus pressure/congestion. Negative for rhinorrhea, sneezing, sore throat and trouble swallowing.    Respiratory:  Negative for cough and shortness of breath.    Cardiovascular:  Negative for chest pain.   Gastrointestinal:  Negative for abdominal pain, diarrhea, nausea and vomiting.   Musculoskeletal:  Negative for back pain.   Neurological:  Negative for weakness.              Objective:        Physical Exam  HENT:      Head: Normocephalic and atraumatic.      Nose: Nose normal.      Mouth/Throat:      Mouth: Mucous membranes are moist.      Pharynx: Oropharynx is clear.   Eyes:      Extraocular Movements: Extraocular movements intact.      Conjunctiva/sclera: Conjunctivae normal.      Pupils: Pupils are equal, round, and reactive to light.   Cardiovascular:      Rate and Rhythm: Normal rate and regular rhythm.   Pulmonary:      Effort: Pulmonary effort is normal.      Breath sounds: Normal breath sounds.   Musculoskeletal:         General: No swelling. Normal range of motion.      Cervical back: Normal range of motion.      Right lower leg: No edema.      Left lower leg: No edema.   Skin:     General: Skin is warm.      Findings: No lesion or rash.   Neurological:      General: No focal deficit present.      Mental Status: She is alert and oriented to person, place, and time.      Motor: No weakness.   Psychiatric:         Mood and Affect: Mood normal.         Thought Content: Thought content normal.          Assessment:         Problem List Items Addressed This Visit    None  Visit Diagnoses       COVID-19    -  Primary              Plan:         1. COVID-19    - discussed etiology of symptoms and treatment: recommend low dose intranasal steroids and Antihistamine, and nasal saline rinse. OTC cough suppressants and lozenges prn. Pt instructed to notify clinic if fever develops, or no improvement.          Follow up with PCP    Tegan Cota MD

## 2024-07-24 ENCOUNTER — HOSPITAL ENCOUNTER (OUTPATIENT)
Dept: RADIOLOGY | Facility: HOSPITAL | Age: 79
Discharge: HOME | End: 2024-07-24
Attending: ORTHOPAEDIC SURGERY
Payer: COMMERCIAL

## 2024-07-24 ENCOUNTER — OFFICE VISIT (OUTPATIENT)
Dept: ORTHOPEDICS | Facility: CLINIC | Age: 79
End: 2024-07-24
Payer: COMMERCIAL

## 2024-07-24 VITALS — HEIGHT: 67 IN | BODY MASS INDEX: 37.98 KG/M2 | WEIGHT: 242 LBS

## 2024-07-24 DIAGNOSIS — M47.816 LUMBAR FACET ARTHROPATHY: ICD-10-CM

## 2024-07-24 DIAGNOSIS — M48.061 LUMBAR FORAMINAL STENOSIS: ICD-10-CM

## 2024-07-24 DIAGNOSIS — M70.61 GREATER TROCHANTERIC BURSITIS OF RIGHT HIP: ICD-10-CM

## 2024-07-24 DIAGNOSIS — M54.16 LUMBAR RADICULOPATHY: ICD-10-CM

## 2024-07-24 DIAGNOSIS — M43.10 SPONDYLOLISTHESIS, ACQUIRED: Primary | ICD-10-CM

## 2024-07-24 DIAGNOSIS — M51.36 DDD (DEGENERATIVE DISC DISEASE), LUMBAR: ICD-10-CM

## 2024-07-24 PROCEDURE — 72100 X-RAY EXAM L-S SPINE 2/3 VWS: CPT | Mod: TC,PN

## 2024-07-24 PROCEDURE — 99999 PR PBB SHADOW E&M-EST. PATIENT-LVL III: CPT | Mod: PBBFAC,,, | Performed by: ORTHOPAEDIC SURGERY

## 2024-07-24 PROCEDURE — 1159F MED LIST DOCD IN RCRD: CPT | Mod: CPTII,S$GLB,, | Performed by: ORTHOPAEDIC SURGERY

## 2024-07-24 PROCEDURE — 72170 X-RAY EXAM OF PELVIS: CPT | Mod: 26,,, | Performed by: RADIOLOGY

## 2024-07-24 PROCEDURE — 72100 X-RAY EXAM L-S SPINE 2/3 VWS: CPT | Mod: 26,,, | Performed by: RADIOLOGY

## 2024-07-24 PROCEDURE — 1101F PT FALLS ASSESS-DOCD LE1/YR: CPT | Mod: CPTII,S$GLB,, | Performed by: ORTHOPAEDIC SURGERY

## 2024-07-24 PROCEDURE — 72170 X-RAY EXAM OF PELVIS: CPT | Mod: TC,PN

## 2024-07-24 PROCEDURE — 3288F FALL RISK ASSESSMENT DOCD: CPT | Mod: CPTII,S$GLB,, | Performed by: ORTHOPAEDIC SURGERY

## 2024-07-24 PROCEDURE — 99213 OFFICE O/P EST LOW 20 MIN: CPT | Mod: 25,S$GLB,, | Performed by: ORTHOPAEDIC SURGERY

## 2024-07-24 PROCEDURE — 20610 DRAIN/INJ JOINT/BURSA W/O US: CPT | Mod: RT,S$GLB,, | Performed by: ORTHOPAEDIC SURGERY

## 2024-07-24 PROCEDURE — 1125F AMNT PAIN NOTED PAIN PRSNT: CPT | Mod: CPTII,S$GLB,, | Performed by: ORTHOPAEDIC SURGERY

## 2024-07-24 PROCEDURE — 1160F RVW MEDS BY RX/DR IN RCRD: CPT | Mod: CPTII,S$GLB,, | Performed by: ORTHOPAEDIC SURGERY

## 2024-07-24 RX ORDER — TRIAMCINOLONE ACETONIDE 40 MG/ML
40 INJECTION, SUSPENSION INTRA-ARTICULAR; INTRAMUSCULAR
Status: DISCONTINUED | OUTPATIENT
Start: 2024-07-24 | End: 2024-07-24 | Stop reason: HOSPADM

## 2024-07-24 RX ORDER — MEMANTINE HYDROCHLORIDE 10 MG/1
5 TABLET ORAL 2 TIMES DAILY
COMMUNITY

## 2024-07-24 RX ADMIN — TRIAMCINOLONE ACETONIDE 40 MG: 40 INJECTION, SUSPENSION INTRA-ARTICULAR; INTRAMUSCULAR at 11:07

## 2024-07-24 NOTE — PROGRESS NOTES
Subjective:       Patient ID: Thao Perera is a 78 y.o. female.    Chief Complaint: Pain of the Lumbar Spine (Patient is here for a f/up on Lumbar pain, states her pain is a little worse, denies numbness & tingling, occasional pain down the legs)      History of Present Illness    Prior to meeting with the patient I reviewed the medical chart in [facility]. This included reviewing the previous progress notes from our office, review of the patient's last appointment with their primary care provider, review of any visits to the emergency room, and review of any pain management appointments or procedures.   ***    Current Medications  Current Outpatient Medications   Medication Sig Dispense Refill    acetaminophen (TYLENOL) 500 MG tablet Take 500 mg by mouth.      donepeziL (ARICEPT) 5 MG tablet Take 5 mg by mouth.      furosemide (LASIX) 20 MG tablet Take 20 mg by mouth once daily.      memantine (NAMENDA) 10 MG Tab Take 5 mg by mouth 2 (two) times daily.      pregabalin (LYRICA) 75 MG capsule Take 75 mg by mouth.      XARELTO 15 mg Tab Take 15 mg by mouth 2 (two) times daily.       No current facility-administered medications for this visit.     Facility-Administered Medications Ordered in Other Visits   Medication Dose Route Frequency Provider Last Rate Last Admin    sodium chloride 0.9% flush 3 mL  3 mL Intravenous PRN Quincy Reyna MD           Allergies  Review of patient's allergies indicates:  No Known Allergies    Past Medical History  Past Medical History:   Diagnosis Date    Arthritis        Surgical History  Past Surgical History:   Procedure Laterality Date    APPENDECTOMY      CHOLECYSTECTOMY      EYE SURGERY Bilateral     IOL    HIP ARTHROPLASTY Right 9/13/2018    Procedure: ARTHROPLASTY, HIP;  Surgeon: Quincy Reyna MD;  Location: [facility];  Service: Orthopedics;  Laterality: Right;    HYSTERECTOMY      INJECTION OF ANESTHETIC AGENT AROUND NERVE Bilateral 3/3/2021    Procedure: BLOCK, NERVE  BILATERAL L3, 4, 5 MEDIAL BRANCH BLOCK;  Surgeon: Joe Schwartz MD;  Location: [facility] PAIN MGT;  Service: Pain Management;  Laterality: Bilateral;  NEEDS CONSENT    INJECTION OF ANESTHETIC AGENT AROUND NERVE Bilateral 4/28/2021    Procedure: BLOCK, NERVE BILATERAL L3, 4, 5 MEDIAL BRANCH BLOCK;  Surgeon: Joe Schwartz MD;  Location: [facility] PAIN MGT;  Service: Pain Management;  Laterality: Bilateral;  NEEDS CONSENT    JOINT REPLACEMENT Left     hip, bilateral knees    TONSILLECTOMY      TRANSFORAMINAL EPIDURAL INJECTION OF STEROID Bilateral 1/6/2023    Procedure: INJECTION, STEROID, EPIDURAL, TRANSFORAMINAL APPROACH, BILATERAL L4-L5 CONTRAST Direct Ref;  Surgeon: Quirino Saucedo MD;  Location: [facility] PAIN MGT;  Service: Pain Management;  Laterality: Bilateral;       Family History:   No family history on file.    Social History:   Social History     Socioeconomic History    Marital status: Single   Tobacco Use    Smoking status: Never     Passive exposure: Never    Smokeless tobacco: Never   Substance and Sexual Activity    Alcohol use: Yes     Alcohol/week: 1.0 standard drink of alcohol     Types: 1 Glasses of wine per week     Comment: occasionally    Drug use: No       Hospitalization/Major Diagnostic Procedure:     Review of Systems     General/Constitutional:  Chills denies. Fatigue denies. Fever denies. Weight gain denies. Weight loss denies.    Respiratory:  Shortness of breath denies.    Cardiovascular:  Chest pain denies.    Gastrointestinal:  Constipation denies. Diarrhea denies. Nausea denies. Vomiting denies.     Hematology:  Easy bruising denies. Prolonged bleeding denies.     Genitourinary:  Frequent urination denies. Pain in lower back denies. Painful urination denies.     Musculoskeletal:  See HPI for details    Skin:  Rash denies.    Neurologic:  Dizziness denies. Gait abnormalities denies. Seizures denies. Tingling/Numbess denies.    Psychiatric:  Anxiety denies. Depressed mood denies.      Objective:   Vital Signs: There were no vitals filed for this visit.     Physical Exam      General Examination:     Constitutional: The patient is alert and oriented to lace person and time. Mood is pleasant.     Head/Face: Normal facial features normal eyebrows    Eyes: Normal extraocular motion bilaterally    Lungs: Respirations are equal and unlabored    Gait is coordinated.    Cardiovascular: There are no swelling or varicosities present.    Lymphatic: Negative for adenopathy    Skin: Normal    Neurological: Level of consciousness normal. Oriented to place person and time and situation    Psychiatric: Oriented to time place person and situation    ***  XRAY Report/ Interpretation:***      Assessment:       1. Spondylolisthesis, acquired    2. Lumbar radiculopathy    3. DDD (degenerative disc disease), lumbar    4. Lumbar facet arthropathy    5. Lumbar foraminal stenosis        Plan:       Thao was seen today for pain.    Diagnoses and all orders for this visit:    Spondylolisthesis, acquired    Lumbar radiculopathy    DDD (degenerative disc disease), lumbar    Lumbar facet arthropathy    Lumbar foraminal stenosis         No follow-ups on file.    ***  Treatment options were discussed with regards to the nature of the medical condition. Conservative pain intervention and surgical options were discussed in detail. The probability of success of each separate treatment option was discussed. The patient expressed a clear understanding of the treatment options. With regards to surgery, the procedure risk, benefits, complications, and outcomes were discussed. No guarantees were given with regards to surgical outcome.   The risk of complications, morbidity, and mortality of patient management decisions have been made at the time of this visit. These are associated with the patient's problems, diagnostic procedures and treatment options. This includes the possible management options selected and those considered  but not selected by the patient after shared medical decision making we discussed with the patient.     This note was created using Dragon voice recognition software that occasionally misinterpreted phrases or words.

## 2024-07-24 NOTE — PROCEDURES
Large Joint Aspiration/Injection: R hip joint    Date/Time: 7/24/2024 11:00 AM    Performed by: Yadiel Denis MD  Authorized by: Yadiel eDnis MD    Consent Done?:  Yes (Verbal)  Indications:  Pain  Site marked: the procedure site was marked    Timeout: prior to procedure the correct patient, procedure, and site was verified    Prep: patient was prepped and draped in usual sterile fashion      Local anesthesia used?: Yes    Local anesthetic:  Lidocaine 1% without epinephrine    Details:  Needle Size:  22 G  Ultrasonic Guidance for needle placement?: No    Location:  Hip  Site:  R hip joint  Medications:  40 mg triamcinolone acetonide 40 mg/mL  Patient tolerance:  Patient tolerated the procedure well with no immediate complications

## 2024-07-24 NOTE — PROGRESS NOTES
Subjective:       Patient ID: Thao Perera is a 78 y.o. female.    Chief Complaint: Pain of the Lumbar Spine (Patient is here for a f/up on Lumbar pain, states her pain is a little worse, denies numbness & tingling, occasional pain down the legs)      History of Present Illness    Prior to meeting with the patient I reviewed the medical chart in [facility]. This included reviewing the previous progress notes from our office, review of the patient's last appointment with their primary care provider, review of any visits to the emergency room, and review of any pain management appointments or procedures.   Thao comes in today for follow-up for her chronic low back pain.  She has had various treatments for her lumbar spine over the years to include medial branch blocks and epidural steroid injections.  She she does have a bladder stimulator in place to help with emptying.  Does not know if it is MRI compatible or not.  She is also complaining of right lateral hip pain.  No real complaint of radicular symptoms.    Current Medications  Current Outpatient Medications   Medication Sig Dispense Refill    acetaminophen (TYLENOL) 500 MG tablet Take 500 mg by mouth.      donepeziL (ARICEPT) 5 MG tablet Take 5 mg by mouth.      furosemide (LASIX) 20 MG tablet Take 20 mg by mouth once daily.      memantine (NAMENDA) 10 MG Tab Take 5 mg by mouth 2 (two) times daily.      pregabalin (LYRICA) 75 MG capsule Take 75 mg by mouth.      XARELTO 15 mg Tab Take 15 mg by mouth 2 (two) times daily.       No current facility-administered medications for this visit.     Facility-Administered Medications Ordered in Other Visits   Medication Dose Route Frequency Provider Last Rate Last Admin    sodium chloride 0.9% flush 3 mL  3 mL Intravenous PRN Quincy Reyna MD           Allergies  Review of patient's allergies indicates:  No Known Allergies    Past Medical History  Past Medical History:   Diagnosis Date    Arthritis        Surgical  History  Past Surgical History:   Procedure Laterality Date    APPENDECTOMY      CHOLECYSTECTOMY      EYE SURGERY Bilateral     IOL    HIP ARTHROPLASTY Right 9/13/2018    Procedure: ARTHROPLASTY, HIP;  Surgeon: Quincy Reyna MD;  Location: [facility] OR;  Service: Orthopedics;  Laterality: Right;    HYSTERECTOMY      INJECTION OF ANESTHETIC AGENT AROUND NERVE Bilateral 3/3/2021    Procedure: BLOCK, NERVE BILATERAL L3, 4, 5 MEDIAL BRANCH BLOCK;  Surgeon: Joe Schwartz MD;  Location: [facility] PAIN MGT;  Service: Pain Management;  Laterality: Bilateral;  NEEDS CONSENT    INJECTION OF ANESTHETIC AGENT AROUND NERVE Bilateral 4/28/2021    Procedure: BLOCK, NERVE BILATERAL L3, 4, 5 MEDIAL BRANCH BLOCK;  Surgeon: Joe Schwartz MD;  Location: [facility] PAIN MGT;  Service: Pain Management;  Laterality: Bilateral;  NEEDS CONSENT    JOINT REPLACEMENT Left     hip, bilateral knees    TONSILLECTOMY      TRANSFORAMINAL EPIDURAL INJECTION OF STEROID Bilateral 1/6/2023    Procedure: INJECTION, STEROID, EPIDURAL, TRANSFORAMINAL APPROACH, BILATERAL L4-L5 CONTRAST Direct Ref;  Surgeon: Quirino Saucedo MD;  Location: [facility] PAIN MGT;  Service: Pain Management;  Laterality: Bilateral;       Family History:   No family history on file.    Social History:   Social History     Socioeconomic History    Marital status: Single   Tobacco Use    Smoking status: Never     Passive exposure: Never    Smokeless tobacco: Never   Substance and Sexual Activity    Alcohol use: Yes     Alcohol/week: 1.0 standard drink of alcohol     Types: 1 Glasses of wine per week     Comment: occasionally    Drug use: No       Hospitalization/Major Diagnostic Procedure:     Review of Systems     General/Constitutional:  Chills denies. Fatigue denies. Fever denies. Weight gain denies. Weight loss denies.    Respiratory:  Shortness of breath denies.    Cardiovascular:  Chest pain denies.    Gastrointestinal:  Constipation denies. Diarrhea denies. Nausea denies.  Vomiting denies.     Hematology:  Easy bruising denies. Prolonged bleeding denies.     Genitourinary:  Frequent urination denies. Pain in lower back denies. Painful urination denies.     Musculoskeletal:  See HPI for details    Skin:  Rash denies.    Neurologic:  Dizziness denies. Gait abnormalities denies. Seizures denies. Tingling/Numbess denies.    Psychiatric:  Anxiety denies. Depressed mood denies.     Objective:   Vital Signs: There were no vitals filed for this visit.     Physical Exam      General Examination:     Constitutional: The patient is alert and oriented to lace person and time. Mood is pleasant.     Head/Face: Normal facial features normal eyebrows    Eyes: Normal extraocular motion bilaterally    Lungs: Respirations are equal and unlabored    Gait is coordinated.    Cardiovascular: There are no swelling or varicosities present.    Lymphatic: Negative for adenopathy    Skin: Normal    Neurological: Level of consciousness normal. Oriented to place person and time and situation    Psychiatric: Oriented to time place person and situation    Lumbar exam: Skin throughout the lower back clean dry and intact.  No erythema or ecchymosis.  No signs or symptoms of infection.  Negative Homans sign bilaterally.  She does present to the clinic today ambulating via wheelchair.  She does ambulate with a rolling walker.  She is morbidly obese.  She is tender to palpation over the right greater trochanter localizes this as her area of maximal tenderness.  She does have diffuse tenderness to palpation about bilateral lumbar paravertebrals.    XRAY Report/ Interpretation:  Single AP pelvis taken today.  No acute fractures or dislocations seen.  She does have bilateral total hip arthroplasties in place without evidence of hardware failure or subsidence.      Two views taken lumbar spine today: AP and lateral.  No acute fractures or dislocations seen.  She has multilevel degenerative disc disease throughout the lumbar  spine.  She has diffuse sclerosis in the lower lumbar facet joints.    Assessment:       1. Spondylolisthesis, acquired    2. Lumbar radiculopathy    3. DDD (degenerative disc disease), lumbar    4. Lumbar facet arthropathy    5. Lumbar foraminal stenosis    6. Greater trochanteric bursitis of right hip        Plan:       Thao was seen today for pain.    Diagnoses and all orders for this visit:    Spondylolisthesis, acquired  -     X-Ray Lumbar Spine Ap And Lateral  -     X-Ray Pelvis Routine AP    Lumbar radiculopathy  -     X-Ray Lumbar Spine Ap And Lateral  -     X-Ray Pelvis Routine AP    DDD (degenerative disc disease), lumbar  -     X-Ray Lumbar Spine Ap And Lateral  -     X-Ray Pelvis Routine AP    Lumbar facet arthropathy  -     X-Ray Lumbar Spine Ap And Lateral  -     X-Ray Pelvis Routine AP    Lumbar foraminal stenosis  -     X-Ray Lumbar Spine Ap And Lateral  -     X-Ray Pelvis Routine AP    Greater trochanteric bursitis of right hip         No follow-ups on file.  This is to attest that the medical assistant, Ethan Gold served in the capacity as a scribe for this patient's encounter.  This is also verify that I have reviewed the patient's history and  formulated the treatment plan for this patient.  I have  evaluated this patient  and formulated a treatment plan for this patient visit.  The treatment plan and medical decision-making is outlined below    In regards to the right hip, I injected the right greater trochanteric bursa today 40 mg of Kenalog and lidocaine.  She tolerated this well.  She does have an appointment next week with her neurologist, Dr. Labadie in [place].  She will discuss with him with the compatibility of her bladder stimulator undergo an MRI of her lumbar spine.  If it is compatible, I would like to proceed with an MRI of her lumbar spine and have her follow-up with that data and make further orthopedic treatments from there.  I believe she may benefit from referral to pain  management for interventional procedures such as medial branch blocks with progression to rhizotomy.  If her stimulator is not MRI compatible, then we would proceed with a CT scan.  She will call our office and let us know after she speaks with urologist and we will place the order at that time.  She will follow up after the advanced imaging is complete.    Treatment options were discussed with regards to the nature of the medical condition. Conservative pain intervention and surgical options were discussed in detail. The probability of success of each separate treatment option was discussed. The patient expressed a clear understanding of the treatment options. With regards to surgery, the procedure risk, benefits, complications, and outcomes were discussed. No guarantees were given with regards to surgical outcome.   The risk of complications, morbidity, and mortality of patient management decisions have been made at the time of this visit. These are associated with the patient's problems, diagnostic procedures and treatment options. This includes the possible management options selected and those considered but not selected by the patient after shared medical decision making we discussed with the patient.     This note was created using Dragon voice recognition software that occasionally misinterpreted phrases or words.

## 2024-07-30 ENCOUNTER — TELEPHONE (OUTPATIENT)
Dept: ORTHOPEDICS | Facility: CLINIC | Age: 79
End: 2024-07-30
Payer: COMMERCIAL

## 2024-07-30 DIAGNOSIS — M51.36 DDD (DEGENERATIVE DISC DISEASE), LUMBAR: ICD-10-CM

## 2024-07-30 DIAGNOSIS — M48.061 LUMBAR FORAMINAL STENOSIS: ICD-10-CM

## 2024-07-30 DIAGNOSIS — M54.16 LUMBAR RADICULOPATHY: Primary | ICD-10-CM

## 2024-07-30 NOTE — TELEPHONE ENCOUNTER
Received note that patient's bladder stimulator (TruTone) is MRI compatible and that the radiologist has to turn it off when doing MRI.    Order pended to sign

## 2024-08-02 ENCOUNTER — TELEPHONE (OUTPATIENT)
Dept: ORTHOPEDICS | Facility: CLINIC | Age: 79
End: 2024-08-02
Payer: COMMERCIAL

## 2024-08-15 ENCOUNTER — TELEPHONE (OUTPATIENT)
Dept: ORTHOPEDICS | Facility: CLINIC | Age: 79
End: 2024-08-15
Payer: COMMERCIAL

## 2024-08-15 NOTE — TELEPHONE ENCOUNTER
"Patients caregiver called to say that it has been determined that her implant is compatible with an MRI. The radiologist just "needs to turn it off" Will we please re order the MRI? Please call caregiver Elizabeth Arreola [phone].  "

## 2024-10-08 NOTE — DISCHARGE INSTRUCTIONS
No PE on CTA. 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 I do not think this is causing your symptoms. Follow up with your cardiologist about this.   
No

## 2024-10-11 ENCOUNTER — TELEPHONE (OUTPATIENT)
Dept: ORTHOPEDICS | Facility: CLINIC | Age: 79
End: 2024-10-11
Payer: COMMERCIAL

## 2024-10-11 NOTE — TELEPHONE ENCOUNTER
----- Message from Med Assistant Ceballos sent at 10/11/2024  9:19 AM CDT -----  Elizabeth Arreola --[phone]--Returned your call

## 2024-10-11 NOTE — TELEPHONE ENCOUNTER
Spoke to Elizabeth. She is just now calling me back from 2 months ago when I left a message in regards to the patient. Advised patient will need to do physical therapy 2 times a week for 6 weeks before insurance will consider approving a MRI.She said she'd relay message to the daughter.

## 2024-12-18 ENCOUNTER — OFFICE VISIT (OUTPATIENT)
Dept: ORTHOPEDICS | Facility: CLINIC | Age: 79
End: 2024-12-18
Payer: COMMERCIAL

## 2024-12-18 VITALS — WEIGHT: 247 LBS | HEIGHT: 67 IN | BODY MASS INDEX: 38.77 KG/M2

## 2024-12-18 DIAGNOSIS — M54.16 LUMBAR RADICULOPATHY: Primary | ICD-10-CM

## 2024-12-18 DIAGNOSIS — M51.362 DEGENERATION OF INTERVERTEBRAL DISC OF LUMBAR REGION WITH DISCOGENIC BACK PAIN AND LOWER EXTREMITY PAIN: ICD-10-CM

## 2024-12-18 DIAGNOSIS — M43.16 SPONDYLOLISTHESIS OF LUMBAR REGION: ICD-10-CM

## 2024-12-18 DIAGNOSIS — M47.816 LUMBAR FACET ARTHROPATHY: ICD-10-CM

## 2024-12-18 PROCEDURE — 1101F PT FALLS ASSESS-DOCD LE1/YR: CPT | Mod: CPTII,S$GLB,, | Performed by: ORTHOPAEDIC SURGERY

## 2024-12-18 PROCEDURE — 99999 PR PBB SHADOW E&M-EST. PATIENT-LVL III: CPT | Mod: PBBFAC,,, | Performed by: ORTHOPAEDIC SURGERY

## 2024-12-18 PROCEDURE — 1125F AMNT PAIN NOTED PAIN PRSNT: CPT | Mod: CPTII,S$GLB,, | Performed by: ORTHOPAEDIC SURGERY

## 2024-12-18 PROCEDURE — 1160F RVW MEDS BY RX/DR IN RCRD: CPT | Mod: CPTII,S$GLB,, | Performed by: ORTHOPAEDIC SURGERY

## 2024-12-18 PROCEDURE — 1159F MED LIST DOCD IN RCRD: CPT | Mod: CPTII,S$GLB,, | Performed by: ORTHOPAEDIC SURGERY

## 2024-12-18 PROCEDURE — 3288F FALL RISK ASSESSMENT DOCD: CPT | Mod: CPTII,S$GLB,, | Performed by: ORTHOPAEDIC SURGERY

## 2024-12-18 PROCEDURE — 99213 OFFICE O/P EST LOW 20 MIN: CPT | Mod: S$GLB,,, | Performed by: ORTHOPAEDIC SURGERY

## 2024-12-18 NOTE — PROGRESS NOTES
Subjective:       Patient ID: Thao Perera is a 79 y.o. female.    Chief Complaint: Pain of the Lumbar Spine (Patient is here for a f/up on Lumbar pain, has completed PT, states her pain is the same as her last visit, feels like Pt helped a little. )      History of Present Illness    Prior to meeting with the patient I reviewed the medical chart in [facility]. This included reviewing the previous progress notes from our office, review of the patient's last appointment with their primary care provider, review of any visits to the emergency room, and review of any pain management appointments or procedures.   Patient returns for reassessment continued back pain that radiates to both legs as completed extensive course of physical therapy with no success symptoms are worse with prolonged standing walking no bowel or bladder dysfunction.    Current Medications  Current Outpatient Medications   Medication Sig Dispense Refill    acetaminophen (TYLENOL) 500 MG tablet Take 500 mg by mouth.      donepeziL (ARICEPT) 5 MG tablet Take 5 mg by mouth.      furosemide (LASIX) 20 MG tablet Take 20 mg by mouth once daily.      memantine (NAMENDA) 10 MG Tab Take 5 mg by mouth 2 (two) times daily.      pregabalin (LYRICA) 75 MG capsule Take 75 mg by mouth.      XARELTO 15 mg Tab Take 15 mg by mouth 2 (two) times daily.       No current facility-administered medications for this visit.     Facility-Administered Medications Ordered in Other Visits   Medication Dose Route Frequency Provider Last Rate Last Admin    sodium chloride 0.9% flush 3 mL  3 mL Intravenous PRN Quincy Reyna MD           Allergies  Review of patient's allergies indicates:  No Known Allergies    Past Medical History  Past Medical History:   Diagnosis Date    Arthritis        Surgical History  Past Surgical History:   Procedure Laterality Date    APPENDECTOMY      CHOLECYSTECTOMY      EYE SURGERY Bilateral     IOL    HIP ARTHROPLASTY Right 9/13/2018    Procedure:  ARTHROPLASTY, HIP;  Surgeon: Quincy Reyna MD;  Location: [facility] OR;  Service: Orthopedics;  Laterality: Right;    HYSTERECTOMY      INJECTION OF ANESTHETIC AGENT AROUND NERVE Bilateral 3/3/2021    Procedure: BLOCK, NERVE BILATERAL L3, 4, 5 MEDIAL BRANCH BLOCK;  Surgeon: Joe Schwartz MD;  Location: [facility] PAIN MGT;  Service: Pain Management;  Laterality: Bilateral;  NEEDS CONSENT    INJECTION OF ANESTHETIC AGENT AROUND NERVE Bilateral 4/28/2021    Procedure: BLOCK, NERVE BILATERAL L3, 4, 5 MEDIAL BRANCH BLOCK;  Surgeon: Joe Schwartz MD;  Location: [facility] PAIN MGT;  Service: Pain Management;  Laterality: Bilateral;  NEEDS CONSENT    JOINT REPLACEMENT Left     hip, bilateral knees    TONSILLECTOMY      TRANSFORAMINAL EPIDURAL INJECTION OF STEROID Bilateral 1/6/2023    Procedure: INJECTION, STEROID, EPIDURAL, TRANSFORAMINAL APPROACH, BILATERAL L4-L5 CONTRAST Direct Ref;  Surgeon: Quirino Saucedo MD;  [facility];  Service: Pain Management;  Laterality: Bilateral;       Family History:   No family history on file.    Social History:   Social History     Socioeconomic History    Marital status: Single   Tobacco Use    Smoking status: Never     Passive exposure: Never    Smokeless tobacco: Never   Substance and Sexual Activity    Alcohol use: Yes     Alcohol/week: 1.0 standard drink of alcohol     Types: 1 Glasses of wine per week     Comment: occasionally    Drug use: No       Hospitalization/Major Diagnostic Procedure:     Review of Systems     General/Constitutional:  Chills denies. Fatigue denies. Fever denies. Weight gain denies. Weight loss denies.    Respiratory:  Shortness of breath denies.    Cardiovascular:  Chest pain denies.    Gastrointestinal:  Constipation denies. Diarrhea denies. Nausea denies. Vomiting denies.     Hematology:  Easy bruising denies. Prolonged bleeding denies.     Genitourinary:  Frequent urination denies. Pain in lower back denies. Painful urination denies.      Musculoskeletal:  See HPI for details    Skin:  Rash denies.    Neurologic:  Dizziness denies. Gait abnormalities denies. Seizures denies. Tingling/Numbess denies.    Psychiatric:  Anxiety denies. Depressed mood denies.     Objective:   Vital Signs: There were no vitals filed for this visit.     Physical Exam      General Examination:     Constitutional: The patient is alert and oriented to lace person and time. Mood is pleasant.     Head/Face: Normal facial features normal eyebrows    Eyes: Normal extraocular motion bilaterally    Lungs: Respirations are equal and unlabored    Gait is coordinated.    Cardiovascular: There are no swelling or varicosities present.    Lymphatic: Negative for adenopathy    Skin: Normal    Neurological: Level of consciousness normal. Oriented to place person and time and situation    Psychiatric: Oriented to time place person and situation    Patient has difficulty standing erect range of motion is markedly restricted straight leg raising maneuver is positive bilaterally subjective numbness L4 nerve root distribution bilaterally motor exam intact  XRAY Report/ Interpretation:  Prior lumbar x-rays were reviewed degenerative spondylolisthesis at L4-5 multilevel facet joint arthropathy L3-4 L4-5 L5-S1 noted slight scoliosis apex L3-4 L4-5 noted      Assessment:       1. Lumbar radiculopathy    2. Degeneration of intervertebral disc of lumbar region with discogenic back pain and lower extremity pain    3. Spondylolisthesis of lumbar region    4. Lumbar facet arthropathy        Plan:       Thao was seen today for pain.    Diagnoses and all orders for this visit:    Lumbar radiculopathy    Degeneration of intervertebral disc of lumbar region with discogenic back pain and lower extremity pain    Spondylolisthesis of lumbar region    Lumbar facet arthropathy         No follow-ups on file.    Due to failed conservative measures including physical therapy MRIs spine has been ordered if the  suspicion is that her symptoms of stenosis at L 4 5 we have discussed an interlaminar epidural steroid injection at L4-5 versus pain management referral for spinal cord stimulator.  She is not interested in surgical intervention whatsoever.  Treatment options were discussed with regards to the nature of the medical condition. Conservative pain intervention and surgical options were discussed in detail. The probability of success of each separate treatment option was discussed. The patient expressed a clear understanding of the treatment options. With regards to surgery, the procedure risk, benefits, complications, and outcomes were discussed. No guarantees were given with regards to surgical outcome.   The risk of complications, morbidity, and mortality of patient management decisions have been made at the time of this visit. These are associated with the patient's problems, diagnostic procedures and treatment options. This includes the possible management options selected and those considered but not selected by the patient after shared medical decision making we discussed with the patient.     This note was created using Dragon voice recognition software that occasionally misinterpreted phrases or words.

## 2025-01-14 ENCOUNTER — HOSPITAL ENCOUNTER (OUTPATIENT)
Dept: RADIOLOGY | Facility: HOSPITAL | Age: 80
Discharge: HOME | End: 2025-01-14
Attending: ORTHOPAEDIC SURGERY
Payer: COMMERCIAL

## 2025-01-14 DIAGNOSIS — M54.16 LUMBAR RADICULOPATHY: ICD-10-CM

## 2025-01-14 DIAGNOSIS — M51.362 DEGENERATION OF INTERVERTEBRAL DISC OF LUMBAR REGION WITH DISCOGENIC BACK PAIN AND LOWER EXTREMITY PAIN: ICD-10-CM

## 2025-01-14 DIAGNOSIS — M43.16 SPONDYLOLISTHESIS OF LUMBAR REGION: ICD-10-CM

## 2025-01-21 NOTE — OR NURSING
Asked pt if her hip pain had improved and states that her headache is better.  I said you did not tell me that you had a headache.  Pt states I did not want to complain about that.  asked about her hip and states that it is a 6.  Wants to go to her room  
Asked pt if she was having any pain and states no.  Disconnected from monitors and waiting for transport.  When transport arrived pt asked if I was going to give her pain meds and I stated that she said she was not having pain.  States pain is a 7.  States she just moved and she is hurting.  Cancelled transport.  Daughter notified of status.  
Consult called to Nathaly Beavers np for Dr Dow.  
Rt hip x-ray completed.  Attempted to contact daughter. Went straight to voicemail.  Sent text message through computer  
Transferring to room  
All other review of systems negative, except as noted in HPI

## 2025-01-27 ENCOUNTER — HOSPITAL ENCOUNTER (OUTPATIENT)
Dept: RADIOLOGY | Facility: HOSPITAL | Age: 80
Discharge: HOME | End: 2025-01-27
Attending: ORTHOPAEDIC SURGERY
Payer: COMMERCIAL

## 2025-01-27 PROCEDURE — 72148 MRI LUMBAR SPINE W/O DYE: CPT | Mod: TC,PO

## 2025-01-27 PROCEDURE — 72148 MRI LUMBAR SPINE W/O DYE: CPT | Mod: 26,,, | Performed by: RADIOLOGY

## 2025-02-05 ENCOUNTER — OFFICE VISIT (OUTPATIENT)
Dept: ORTHOPEDICS | Facility: CLINIC | Age: 80
End: 2025-02-05
Payer: COMMERCIAL

## 2025-02-05 VITALS — HEIGHT: 67 IN | BODY MASS INDEX: 40.18 KG/M2 | WEIGHT: 256 LBS

## 2025-02-05 DIAGNOSIS — M48.061 LUMBAR FORAMINAL STENOSIS: Primary | ICD-10-CM

## 2025-02-05 DIAGNOSIS — M51.362 DEGENERATION OF INTERVERTEBRAL DISC OF LUMBAR REGION WITH DISCOGENIC BACK PAIN AND LOWER EXTREMITY PAIN: ICD-10-CM

## 2025-02-05 DIAGNOSIS — M47.816 FACET ARTHRITIS OF LUMBAR REGION: ICD-10-CM

## 2025-02-05 DIAGNOSIS — M43.16 SPONDYLOLISTHESIS OF LUMBAR REGION: ICD-10-CM

## 2025-02-05 DIAGNOSIS — M54.16 LUMBAR RADICULOPATHY: ICD-10-CM

## 2025-02-05 DIAGNOSIS — M41.9 SCOLIOSIS OF THORACOLUMBAR SPINE, UNSPECIFIED SCOLIOSIS TYPE: ICD-10-CM

## 2025-02-05 PROCEDURE — 1101F PT FALLS ASSESS-DOCD LE1/YR: CPT | Mod: CPTII,S$GLB,, | Performed by: ORTHOPAEDIC SURGERY

## 2025-02-05 PROCEDURE — 1125F AMNT PAIN NOTED PAIN PRSNT: CPT | Mod: CPTII,S$GLB,, | Performed by: ORTHOPAEDIC SURGERY

## 2025-02-05 PROCEDURE — 1160F RVW MEDS BY RX/DR IN RCRD: CPT | Mod: CPTII,S$GLB,, | Performed by: ORTHOPAEDIC SURGERY

## 2025-02-05 PROCEDURE — 99999 PR PBB SHADOW E&M-EST. PATIENT-LVL IV: CPT | Mod: PBBFAC,,, | Performed by: ORTHOPAEDIC SURGERY

## 2025-02-05 PROCEDURE — 99213 OFFICE O/P EST LOW 20 MIN: CPT | Mod: S$GLB,,, | Performed by: ORTHOPAEDIC SURGERY

## 2025-02-05 PROCEDURE — 1159F MED LIST DOCD IN RCRD: CPT | Mod: CPTII,S$GLB,, | Performed by: ORTHOPAEDIC SURGERY

## 2025-02-05 PROCEDURE — 3288F FALL RISK ASSESSMENT DOCD: CPT | Mod: CPTII,S$GLB,, | Performed by: ORTHOPAEDIC SURGERY

## 2025-02-05 RX ORDER — PREGABALIN 100 MG/1
100 CAPSULE ORAL 2 TIMES DAILY
COMMUNITY
Start: 2025-02-04

## 2025-02-05 NOTE — PROGRESS NOTES
Subjective:       Patient ID: Thao Perera is a 79 y.o. female.    Chief Complaint: Pain of the Lumbar Spine (MRI Lumbar results, feeling terrible, no leg pain, weakness with legs, )      History of Present Illness    Prior to meeting with the patient I reviewed the medical chart in [facility]. This included reviewing the previous progress notes from our office, review of the patient's last appointment with their primary care provider, review of any visits to the emergency room, and review of any pain management appointments or procedures.   Continued symptoms of back pain with pain and weakness in both legs he discuss results of most recent MRI patient did have medial branch blocks done about 4 years ago which were unsuccessful she also had an epidural steroid injection which was unsuccessful her symptoms are worse with activity she has limited ambulation no bowel or bladder incontinence    Current Medications  Current Outpatient Medications   Medication Sig Dispense Refill    acetaminophen (TYLENOL) 500 MG tablet Take 500 mg by mouth.      donepeziL (ARICEPT) 5 MG tablet Take 5 mg by mouth.      furosemide (LASIX) 20 MG tablet Take 20 mg by mouth once daily.      memantine (NAMENDA) 10 MG Tab Take 5 mg by mouth 2 (two) times daily.      pregabalin (LYRICA) 100 MG capsule Take 100 mg by mouth 2 (two) times daily.      XARELTO 15 mg Tab Take 15 mg by mouth 2 (two) times daily.      pregabalin (LYRICA) 75 MG capsule Take 75 mg by mouth. (Patient not taking: Reported on 2/5/2025)       No current facility-administered medications for this visit.     Facility-Administered Medications Ordered in Other Visits   Medication Dose Route Frequency Provider Last Rate Last Admin    sodium chloride 0.9% flush 3 mL  3 mL Intravenous PRN Quincy Reyna MD           Allergies  Review of patient's allergies indicates:  No Known Allergies    Past Medical History  Past Medical History:   Diagnosis Date    Arthritis        Surgical  History  Past Surgical History:   Procedure Laterality Date    APPENDECTOMY      CHOLECYSTECTOMY      EYE SURGERY Bilateral     IOL    HIP ARTHROPLASTY Right 9/13/2018    Procedure: ARTHROPLASTY, HIP;  Surgeon: Quincy Reyna MD;  Location: [facility] OR;  Service: Orthopedics;  Laterality: Right;    HYSTERECTOMY      INJECTION OF ANESTHETIC AGENT AROUND NERVE Bilateral 3/3/2021    Procedure: BLOCK, NERVE BILATERAL L3, 4, 5 MEDIAL BRANCH BLOCK;  Surgeon: Joe Schwartz MD;  Location: [facility] PAIN MGT;  Service: Pain Management;  Laterality: Bilateral;  NEEDS CONSENT    INJECTION OF ANESTHETIC AGENT AROUND NERVE Bilateral 4/28/2021    Procedure: BLOCK, NERVE BILATERAL L3, 4, 5 MEDIAL BRANCH BLOCK;  Surgeon: Joe Schwartz MD;  Location: [facility] PAIN MGT;  Service: Pain Management;  Laterality: Bilateral;  NEEDS CONSENT    JOINT REPLACEMENT Left     hip, bilateral knees    TONSILLECTOMY      TRANSFORAMINAL EPIDURAL INJECTION OF STEROID Bilateral 1/6/2023    Procedure: INJECTION, STEROID, EPIDURAL, TRANSFORAMINAL APPROACH, BILATERAL L4-L5 CONTRAST Direct Ref;  Surgeon: Quirino Saucedo MD;  Location: [facility] PAIN MGT;  Service: Pain Management;  Laterality: Bilateral;       Family History:   No family history on file.    Social History:   Social History     Socioeconomic History    Marital status: Single   Tobacco Use    Smoking status: Never     Passive exposure: Never    Smokeless tobacco: Never   Substance and Sexual Activity    Alcohol use: Yes     Alcohol/week: 1.0 standard drink of alcohol     Types: 1 Glasses of wine per week     Comment: occasionally    Drug use: No       Hospitalization/Major Diagnostic Procedure:     Review of Systems     General/Constitutional:  Chills denies. Fatigue denies. Fever denies. Weight gain denies. Weight loss denies.    Respiratory:  Shortness of breath denies.    Cardiovascular:  Chest pain denies.    Gastrointestinal:  Constipation denies. Diarrhea denies. Nausea denies.  Vomiting denies.     Hematology:  Easy bruising denies. Prolonged bleeding denies.     Genitourinary:  Frequent urination denies. Pain in lower back denies. Painful urination denies.     Musculoskeletal:  See HPI for details    Skin:  Rash denies.    Neurologic:  Dizziness denies. Gait abnormalities denies. Seizures denies. Tingling/Numbess denies.    Psychiatric:  Anxiety denies. Depressed mood denies.     Objective:   Vital Signs: There were no vitals filed for this visit.     Physical Exam      General Examination:     Constitutional: The patient is alert and oriented to lace person and time. Mood is pleasant.     Head/Face: Normal facial features normal eyebrows    Eyes: Normal extraocular motion bilaterally    Lungs: Respirations are equal and unlabored    Gait is coordinated.    Cardiovascular: There are no swelling or varicosities present.    Lymphatic: Negative for adenopathy    Skin: Normal    Neurological: Level of consciousness normal. Oriented to place person and time and situation    Psychiatric: Oriented to time place person and situation       XRAY Report/ Interpretation:  MRI was personally reviewed and compared the MRI done previously there is evidence of spinal canal stenosis greatest at the L4-5 level please see report for details  42     MRI Lumbar Spine Without Contrast  Order: [id number]  Status: Final result       Visible to patient: No (inaccessible in MyChart)       Next appt: 05/07/2025 at 02:00 PM in Orthopedics (Yadiel Denis MD)       Dx: Lumbar radiculopathy; Spondylolisthes...    0 Result Notes  Details    Reading Physician Reading Date Result Priority   Jamison Palma DO  [phone] 1/27/2025 Routine     Narrative & Impression  EXAMINATION:  MRI LUMBAR SPINE WITHOUT CONTRAST     CLINICAL HISTORY:  lumbar radiculopathy; Radiculopathy, lumbar region     TECHNIQUE:  Multiplanar and multi sequential MRI of the lumbar spine without IV contrast.     COMPARISON:  MRI 11/09/2020.   Lumbar spine radiograph 07/24/2024.     FINDINGS:  The lumbar lordosis is maintained.  Levoscoliosis of the lumbar spine is unchanged.  Inferior endplate Schmorl's nodes at L5 and L1 noted.  There is no other abnormal bone marrow signal.  There is diffuse intervertebral disc height loss with disc desiccation throughout the lumbar spine with associated vertebral body osteophytes.  The visualized distal spinal cord is unremarkable.  The conus medullaris terminates at the L1-2 disc level.  No intrathecal abnormality is observed.  There is atrophy of the dorsal paravertebral musculature.  The visualized abdominal viscera unremarkable.     T12-L1: Mild bilateral facet joint arthropathy.     L1-2: Minimal broad-based disc bulge without significant spinal canal narrowing.  Moderate bilateral facet joint arthropathy and ligamentum flavum thickening.  Small right facet joint effusion.  Mild bilateral neural foraminal narrowing secondary to facet joint arthropathy.     L2-3: Broad-based disc bulge with right foraminal disc protrusion narrowing the bilateral lateral recesses.  There is severe bilateral facet joint arthropathy ligamentum flavum thickening.  There is severe right neural foraminal narrowing secondary to broad-based disc bulge and facet joint arthropathy with facet joint arthropathy touching the right L2 nerve root in the neural foramina.  Right neural foraminal disc protrusion causes mild right neural foraminal narrowing.     L3-4: Broad-based disc bulge indents the ventral thecal sac.  There is severe bilateral facet joint arthropathy and ligamentum flavum thickening.  There is severe right neural foraminal narrowing secondary to facet joint arthropathy and vertebral body osteophytes with facet joint arthropathy touching the right L3 nerve root in the neural foramina.  Mild left neural foraminal narrowing secondary to broad-based disc bulge.     L4-5: A broad-based disc bulge indents the ventral thecal sac.   There is severe bilateral facet joint arthropathy and mild ligamentum flavum thickening contribute to spinal canal narrowing.  AP diameter thecal sac 9 mm.  Mild-to-moderate right and mild left neural foraminal narrowing secondary to facet joint arthropathy.     L5-S1: Severe bilateral facet joint arthropathy and mild ligamentum flavum thickening with moderate to severe left neural foraminal narrowing.     Impression:     Multilevel discogenic and facet degenerative changes with varying degrees of spinal canal or neural foraminal narrowing as described.        Electronically signed by:Jamison Palma  Date:                                            01/27/2025  Time:                                           13:59          Exam Ended: 01/27/25 13:12 CST Last Resulted: 01/27/25 13:59 CST       Order Details        View Encounter        Lab and Collection Details        Routing        Result History             Assessment:       1. Lumbar foraminal stenosis    2. Degeneration of intervertebral disc of lumbar region with discogenic back pain and lower extremity pain    3. Lumbar radiculopathy    4. Spondylolisthesis of lumbar region    5. Scoliosis of thoracolumbar spine, unspecified scoliosis type    6. Facet arthritis of lumbar region        Plan:       Thao was seen today for pain.    Diagnoses and all orders for this visit:    Lumbar foraminal stenosis    Degeneration of intervertebral disc of lumbar region with discogenic back pain and lower extremity pain  -     Ambulatory referral/consult to Pain Clinic; Future    Lumbar radiculopathy  -     Ambulatory referral/consult to Pain Clinic; Future    Spondylolisthesis of lumbar region    Scoliosis of thoracolumbar spine, unspecified scoliosis type    Facet arthritis of lumbar region  -     Ambulatory referral/consult to Pain Clinic; Future         Follow up for 2 month lumbar pain - pain management f/u.    Treatment options have been discussed and she is not  interested in surgical intervention her only options at this point in time a repeat trial of pain management injections such as medial branch blocks and possible rhizotomy is versus a spinal cord stimulator.  I suggested she take the least aggressive approach and we will refer her to pain management for possible medial branch blocks and rhizotomy is further questioning of the patient revealed that when she had medial branch blocks there were done at 6 different levels and she had excruciating pain and does not recall whether there was any temporary improvement in her symptomatology therefore we are reassessing her facet joint mediated pain if at all possible  Treatment options were discussed with regards to the nature of the medical condition. Conservative pain intervention and surgical options were discussed in detail. The probability of success of each separate treatment option was discussed. The patient expressed a clear understanding of the treatment options. With regards to surgery, the procedure risk, benefits, complications, and outcomes were discussed. No guarantees were given with regards to surgical outcome.   The risk of complications, morbidity, and mortality of patient management decisions have been made at the time of this visit. These are associated with the patient's problems, diagnostic procedures and treatment options. This includes the possible management options selected and those considered but not selected by the patient after shared medical decision making we discussed with the patient.     This note was created using Dragon voice recognition software that occasionally misinterpreted phrases or words.

## 2025-02-14 ENCOUNTER — TELEPHONE (OUTPATIENT)
Dept: PAIN MEDICINE | Facility: CLINIC | Age: 80
End: 2025-02-14

## 2025-02-14 ENCOUNTER — OFFICE VISIT (OUTPATIENT)
Dept: PAIN MEDICINE | Facility: CLINIC | Age: 80
End: 2025-02-14
Payer: COMMERCIAL

## 2025-02-14 VITALS — HEIGHT: 67 IN | WEIGHT: 256 LBS | BODY MASS INDEX: 40.18 KG/M2

## 2025-02-14 DIAGNOSIS — M47.817 SPONDYLOSIS OF LUMBOSACRAL REGION WITHOUT MYELOPATHY OR RADICULOPATHY: Primary | ICD-10-CM

## 2025-02-14 DIAGNOSIS — M47.816 FACET ARTHRITIS OF LUMBAR REGION: ICD-10-CM

## 2025-02-14 DIAGNOSIS — M51.362 DEGENERATION OF INTERVERTEBRAL DISC OF LUMBAR REGION WITH DISCOGENIC BACK PAIN AND LOWER EXTREMITY PAIN: ICD-10-CM

## 2025-02-14 DIAGNOSIS — M54.16 LUMBAR RADICULOPATHY: ICD-10-CM

## 2025-02-14 PROCEDURE — 99999 PR PBB SHADOW E&M-EST. PATIENT-LVL III: CPT | Mod: PBBFAC,,, | Performed by: STUDENT IN AN ORGANIZED HEALTH CARE EDUCATION/TRAINING PROGRAM

## 2025-02-14 NOTE — PROGRESS NOTES
[place] - Department    Edmond Taylor MD      First Office Visit: 2/14/25  Today' Date: 2/14/2025  Last Office Visit: None    Chief complaint: back pain    HPI: Pt is a pleasant 79 y.o., who presents for evaluation. Referred by Dr. Denis. Pt seen for back pain for yrs. Endorses having back pain that goes across the lower back and is worse with all movement. States in the past she has had sharp, shooting pain going down the back of the R leg to the ankle but states she is currently not having that pain. Pt has seen Dr. Denis and was recommended to try MBB/RFA and also potentially SCS trial. Has had a few back injections in the past but it has been yrs since last injection. No BB changes. Pt is very deconditioned and has been trying to be more active. Is doing HEP.         Pain disability index score: 63  Pain score: 5    Relevant Imaging/ Testing:   MR L-spine 12/24  XR Pelvis 7/24  XR L-spine 8/22, 7/24    Procedures: None    Date of board of pharmacy review:2/14/2025  Date of opioid risk screening/ pain psych: None  Date of opioid agreement and consent: None  Date of urine drug screen: None  Date of random pill count: None     was reviewed today: reviewed, no concerns     Prescribed medications: None    See EHR for  PMH, PSH, FH, SH, Medications and Allergy    ROS:  Positive for pain  ROS     PE:  There were no vitals filed for this visit.  General: Pleasant, no distress  HEENT: NC/ AT. PERRLA  CV: Radial pulses intact  Pulm: No distress  Ext: No edema    Physical Exam     Neuromusculoskeletal:  Head: NC, AT. PERRLA  Neck: Intact range of motions  Shoulder: Intact range of motion  Lumbar: limited range of motion d/t pain and chronic deconditioning\. Bilat Facet loading. Min Tenderness. Neg SL. No pain with flexion. No pain with extension.   Hip: Intact range of motion  SI: Level, B SIJ tenderness  Knee: Intact range of motion  Reflexes: normal Knee  Strength: 4/5 R hip flexor, 5/5 globally elsewhere    Sensory: L thigh decreased sensation, otherwise grossly intact   Skin: No bruising, erythema  Gait: w/ chair      Impression:  Back pain  Relevant History  BMI 40.10  Osteoarthritis hip  L shoulder impingement   B CTS  Bladder stimulator in situ     Assessment:  Axial back pain  Lumbar spondylosis   DDD - multilevel mod to severe spinal stenosis and L4-5 broad disc bulge indents the ventral thecal sac     Plan:  Discussed options  Imaging/ relevant records viewed/ reviewed/ discussed  Imaging results viewed and reviewed (noted above)/ reviewed with patient   reviewed  Cont HEP  Recommended dedicated exercise and activity d/t chronic deconditioning   B MBB L4-5 and L5-S1  Re-eval after  Plan for repeat MBB and RFA if appropriate  Consider ILESI L4-5  Consider SCS trial per Dr. Denis recs   Cont care with Dr. Denis      Prescribed medications:  1. None    The impression and plan were discussed and explained in detail. All the questions were answered. Education was provided accordingly.     The procedure was explained in detail, along with risks and potential side effects.    Follow-up:  For procedure     Tory Ardon MD

## 2025-02-14 NOTE — TELEPHONE ENCOUNTER
Types of orders made on 02/14/2025: Outpatient Referral, Procedure Request      Order Date:2/14/2025   Ordering User:BART DICK [[id number]]   Encounter Provider:Bart Dick MD [[id number]]   Authorizing Provider: Bart Dick MD [[id number]]   Department:[facility] PAIN MANAGEMENT[[id number]]      Common Order Information   Procedure -> Medial Branch Block (Specify level and laterality) Cmt: B MBB L4-5             and L5-S1      Order Specific Information   Order: Procedure Order to Pain Management [Custom: [id number]]  Order #:          [id number]: 1 FUTURE     Priority: Routine  Class: Clinic Performed     Future Order Information       Expires on:02/14/2026            Expected by:02/14/2025                   Associated Diagnoses       M47.817 Spondylosis of lumbosacral region without myelopathy or       radiculopathy       Facility Name: [facility]          Follow-up: -> 2 weeks              Priority: Routine  Class: Clinic Performed     Future Order Information       Expires on:02/14/2026            Expected by:02/14/2025                   Associated Diagnoses       M47.817 Spondylosis of lumbosacral region without myelopathy or       radiculopathy       Procedure -> Medial Branch Block (Specify level and laterality) Cmt: B MBB                 L4-5 and L5-S1          Facility Name: -> [facility]

## 2025-02-14 NOTE — H&P (VIEW-ONLY)
[place] - Department    Edmond Taylor MD      First Office Visit: 2/14/25  Today' Date: 2/14/2025  Last Office Visit: None    Chief complaint: back pain    HPI: Pt is a pleasant 79 y.o., who presents for evaluation. Referred by Dr. Denis. Pt seen for back pain for yrs. Endorses having back pain that goes across the lower back and is worse with all movement. States in the past she has had sharp, shooting pain going down the back of the R leg to the ankle but states she is currently not having that pain. Pt has seen Dr. Denis and was recommended to try MBB/RFA and also potentially SCS trial. Has had a few back injections in the past but it has been yrs since last injection. No BB changes. Pt is very deconditioned and has been trying to be more active. Is doing HEP.         Pain disability index score: 63  Pain score: 5    Relevant Imaging/ Testing:   MR L-spine 12/24  XR Pelvis 7/24  XR L-spine 8/22, 7/24    Procedures: None    Date of board of pharmacy review:2/14/2025  Date of opioid risk screening/ pain psych: None  Date of opioid agreement and consent: None  Date of urine drug screen: None  Date of random pill count: None     was reviewed today: reviewed, no concerns     Prescribed medications: None    See EHR for  PMH, PSH, FH, SH, Medications and Allergy    ROS:  Positive for pain  ROS     PE:  There were no vitals filed for this visit.  General: Pleasant, no distress  HEENT: NC/ AT. PERRLA  CV: Radial pulses intact  Pulm: No distress  Ext: No edema    Physical Exam     Neuromusculoskeletal:  Head: NC, AT. PERRLA  Neck: Intact range of motions  Shoulder: Intact range of motion  Lumbar: limited range of motion d/t pain and chronic deconditioning\. Bilat Facet loading. Min Tenderness. Neg SL. No pain with flexion. No pain with extension.   Hip: Intact range of motion  SI: Level, B SIJ tenderness  Knee: Intact range of motion  Reflexes: normal Knee  Strength: 4/5 R hip flexor, 5/5 globally elsewhere    Sensory: L thigh decreased sensation, otherwise grossly intact   Skin: No bruising, erythema  Gait: w/ chair      Impression:  Back pain  Relevant History  BMI 40.10  Osteoarthritis hip  L shoulder impingement   B CTS  Bladder stimulator in situ     Assessment:  Axial back pain  Lumbar spondylosis   DDD - multilevel mod to severe spinal stenosis and L4-5 broad disc bulge indents the ventral thecal sac     Plan:  Discussed options  Imaging/ relevant records viewed/ reviewed/ discussed  Imaging results viewed and reviewed (noted above)/ reviewed with patient   reviewed  Cont HEP  Recommended dedicated exercise and activity d/t chronic deconditioning   B MBB L4-5 and L5-S1  Re-eval after  Plan for repeat MBB and RFA if appropriate  Consider ILESI L4-5  Consider SCS trial per Dr. Denis recs   Cont care with Dr. Denis      Prescribed medications:  1. None    The impression and plan were discussed and explained in detail. All the questions were answered. Education was provided accordingly.     The procedure was explained in detail, along with risks and potential side effects.    Follow-up:  For procedure     Tory Ardon MD

## 2025-03-03 ENCOUNTER — TELEPHONE (OUTPATIENT)
Dept: PAIN MEDICINE | Facility: CLINIC | Age: 80
End: 2025-03-03
Payer: COMMERCIAL

## 2025-03-03 NOTE — TELEPHONE ENCOUNTER
----- Message from Lucila sent at 3/3/2025  9:48 AM CST -----  Type: Reschedule Appointment RequestCaller is requesting a sooner appointment.  Name of Caller:Elizabeth Spann/CaregiverWhana is the pt's appointment? 03/03/25 at 11:00Would the patient rather a call back or a response via [facility]? Call [facility] Call Back Number:[phone] Information: Needs to reschedule this procedure, please call caregiver Please call back to advise. Thanks!

## 2025-03-10 ENCOUNTER — HOSPITAL ENCOUNTER (OUTPATIENT)
Facility: HOSPITAL | Age: 80
Discharge: HOME | End: 2025-03-10
Attending: STUDENT IN AN ORGANIZED HEALTH CARE EDUCATION/TRAINING PROGRAM | Admitting: STUDENT IN AN ORGANIZED HEALTH CARE EDUCATION/TRAINING PROGRAM
Payer: COMMERCIAL

## 2025-03-10 DIAGNOSIS — M47.817 SPONDYLOSIS OF LUMBOSACRAL REGION WITHOUT MYELOPATHY OR RADICULOPATHY: Primary | ICD-10-CM

## 2025-03-10 DIAGNOSIS — M47.896 OTHER SPONDYLOSIS, LUMBAR REGION: ICD-10-CM

## 2025-03-10 PROCEDURE — 64493 INJ PARAVERT F JNT L/S 1 LEV: CPT | Mod: 50 | Performed by: STUDENT IN AN ORGANIZED HEALTH CARE EDUCATION/TRAINING PROGRAM

## 2025-03-10 PROCEDURE — 64494 INJ PARAVERT F JNT L/S 2 LEV: CPT | Mod: 50,,, | Performed by: STUDENT IN AN ORGANIZED HEALTH CARE EDUCATION/TRAINING PROGRAM

## 2025-03-10 PROCEDURE — 63600175 PHARM REV CODE 636 W HCPCS: Performed by: STUDENT IN AN ORGANIZED HEALTH CARE EDUCATION/TRAINING PROGRAM

## 2025-03-10 PROCEDURE — 64493 INJ PARAVERT F JNT L/S 1 LEV: CPT | Mod: 50,,, | Performed by: STUDENT IN AN ORGANIZED HEALTH CARE EDUCATION/TRAINING PROGRAM

## 2025-03-10 PROCEDURE — 99152 MOD SED SAME PHYS/QHP 5/>YRS: CPT | Performed by: STUDENT IN AN ORGANIZED HEALTH CARE EDUCATION/TRAINING PROGRAM

## 2025-03-10 PROCEDURE — 64494 INJ PARAVERT F JNT L/S 2 LEV: CPT | Mod: 50 | Performed by: STUDENT IN AN ORGANIZED HEALTH CARE EDUCATION/TRAINING PROGRAM

## 2025-03-10 RX ORDER — SODIUM CHLORIDE, SODIUM LACTATE, POTASSIUM CHLORIDE, CALCIUM CHLORIDE 600; 310; 30; 20 MG/100ML; MG/100ML; MG/100ML; MG/100ML
INJECTION, SOLUTION INTRAVENOUS CONTINUOUS
Status: DISCONTINUED | OUTPATIENT
Start: 2025-03-10 | End: 2025-03-10 | Stop reason: HOSPADM

## 2025-03-10 RX ORDER — LIDOCAINE HYDROCHLORIDE 10 MG/ML
INJECTION, SOLUTION EPIDURAL; INFILTRATION; INTRACAUDAL; PERINEURAL
Status: DISCONTINUED | OUTPATIENT
Start: 2025-03-10 | End: 2025-03-10 | Stop reason: HOSPADM

## 2025-03-10 RX ORDER — LIDOCAINE HYDROCHLORIDE 10 MG/ML
1 INJECTION, SOLUTION EPIDURAL; INFILTRATION; INTRACAUDAL; PERINEURAL ONCE
Status: DISCONTINUED | OUTPATIENT
Start: 2025-03-10 | End: 2025-03-10 | Stop reason: HOSPADM

## 2025-03-10 RX ORDER — BUPIVACAINE HYDROCHLORIDE 2.5 MG/ML
INJECTION, SOLUTION EPIDURAL; INFILTRATION; INTRACAUDAL; PERINEURAL
Status: DISCONTINUED | OUTPATIENT
Start: 2025-03-10 | End: 2025-03-10 | Stop reason: HOSPADM

## 2025-03-10 RX ORDER — MIDAZOLAM HYDROCHLORIDE 1 MG/ML
INJECTION INTRAMUSCULAR; INTRAVENOUS
Status: DISCONTINUED | OUTPATIENT
Start: 2025-03-10 | End: 2025-03-10 | Stop reason: HOSPADM

## 2025-03-10 NOTE — PLAN OF CARE
Tolerated procedure well. Denies pain at this time. Transferred out of facility via wheelchair to [place] [facility] (caregiver) without incident. Discharge instructions in hand upon departure.

## 2025-03-10 NOTE — DISCHARGE SUMMARY
[facility]  Discharge Note  Short Stay    Procedure(s) (LRB):  Block-nerve-medial branch-lumbar (Bilateral)      OUTCOME: Patient tolerated treatment/procedure well without complication and is now ready for discharge.    DISPOSITION: Home or Self Care    FINAL DIAGNOSIS:  <principal problem not specified>    FOLLOWUP: In clinic    DISCHARGE INSTRUCTIONS:    Discharge Procedure Orders   Notify your health care provider if you experience any of the following:  temperature >100.4     Notify your health care provider if you experience any of the following:  severe uncontrolled pain     Notify your health care provider if you experience any of the following:  redness, tenderness, or signs of infection (pain, swelling, redness, odor or green/yellow discharge around incision site)     Activity as tolerated        TIME SPENT ON DISCHARGE: 20 minutes

## 2025-03-10 NOTE — OP NOTE
Patient: Thao Perera                                                    MRN: [id number]  : 1945                                              Date of procedure: 3/10/2025        Pre Procedure Diagnosis: Back pain, Lumbar spondylosis without myelopathy/ lumbosacral region    Post Procedure Diagnosis: Same    Procedure: Bilateral Lumbar Medial Branch Nerve Block for L4-5 and L5-S1 joints under Fluoroscopy     Attending: Tory Ardon MD    Local Anesthetic Injected: 1% Lidocaine 10 ml    Anxiolysis Medications: Versed    Estimated Blood Loss: None    Complication: None        Procedure:  After informed consent was obtained, patient was taken to the fluoroscopy suite and placed in a prone position.  The skin was prepped and draped in the usual sterile fashion using chlorhexidine.  Anatomical landmarks were identified with the aid of fluoroscopy in PA and Oblique views, and the skin and subcutaneous tissue were infiltrated with a total of 10mL of 1% Lidocaine via a 25-gauge 1.5inch needle at each of the intended entry sites.  Subsequently, three 22-gauge 3.5inch needles were advanced with the aid of fluoroscopy such that their tips were located at the respective positions of the superior medial border of the transverse processes with the posterior elements at each level.  Needle placement was confirmed with the aid of fluoroscopy.  After negative aspiration, 0.5mL of 0.25% Bupivicaine was injected at each level.  This was repeated on the other side. There were no complications or paresthesias.   Patient tolerated the procedure well and all needles were removed intact.    Patient was observed in recovery and discharged home with written instruction under supervision in stable condition.

## 2025-03-11 VITALS
OXYGEN SATURATION: 97 % | DIASTOLIC BLOOD PRESSURE: 75 MMHG | SYSTOLIC BLOOD PRESSURE: 125 MMHG | BODY MASS INDEX: 40.17 KG/M2 | HEIGHT: 67 IN | HEART RATE: 60 BPM | WEIGHT: 255.94 LBS | RESPIRATION RATE: 18 BRPM | TEMPERATURE: 98 F

## 2025-05-07 ENCOUNTER — OFFICE VISIT (OUTPATIENT)
Dept: ORTHOPEDICS | Facility: CLINIC | Age: 80
End: 2025-05-07
Payer: COMMERCIAL

## 2025-05-07 ENCOUNTER — TELEPHONE (OUTPATIENT)
Dept: PAIN MEDICINE | Facility: CLINIC | Age: 80
End: 2025-05-07
Payer: COMMERCIAL

## 2025-05-07 VITALS — BODY MASS INDEX: 40.17 KG/M2 | WEIGHT: 255.94 LBS | HEIGHT: 67 IN

## 2025-05-07 DIAGNOSIS — M47.816 FACET ARTHRITIS OF LUMBAR REGION: ICD-10-CM

## 2025-05-07 DIAGNOSIS — M47.816 LUMBAR FACET ARTHROPATHY: Primary | ICD-10-CM

## 2025-05-07 DIAGNOSIS — M47.817 SPONDYLOSIS OF LUMBOSACRAL REGION WITHOUT MYELOPATHY OR RADICULOPATHY: Primary | ICD-10-CM

## 2025-05-07 DIAGNOSIS — M43.16 SPONDYLOLISTHESIS OF LUMBAR REGION: ICD-10-CM

## 2025-05-07 DIAGNOSIS — M48.061 LUMBAR FORAMINAL STENOSIS: ICD-10-CM

## 2025-05-07 PROCEDURE — 1160F RVW MEDS BY RX/DR IN RCRD: CPT | Mod: CPTII,S$GLB,, | Performed by: ORTHOPAEDIC SURGERY

## 2025-05-07 PROCEDURE — 3288F FALL RISK ASSESSMENT DOCD: CPT | Mod: CPTII,S$GLB,, | Performed by: ORTHOPAEDIC SURGERY

## 2025-05-07 PROCEDURE — 1101F PT FALLS ASSESS-DOCD LE1/YR: CPT | Mod: CPTII,S$GLB,, | Performed by: ORTHOPAEDIC SURGERY

## 2025-05-07 PROCEDURE — 99999 PR PBB SHADOW E&M-EST. PATIENT-LVL III: CPT | Mod: PBBFAC,,, | Performed by: ORTHOPAEDIC SURGERY

## 2025-05-07 PROCEDURE — 1125F AMNT PAIN NOTED PAIN PRSNT: CPT | Mod: CPTII,S$GLB,, | Performed by: ORTHOPAEDIC SURGERY

## 2025-05-07 PROCEDURE — 1159F MED LIST DOCD IN RCRD: CPT | Mod: CPTII,S$GLB,, | Performed by: ORTHOPAEDIC SURGERY

## 2025-05-07 PROCEDURE — 99213 OFFICE O/P EST LOW 20 MIN: CPT | Mod: S$GLB,,, | Performed by: ORTHOPAEDIC SURGERY

## 2025-05-07 RX ORDER — IBUPROFEN 200 MG
200 TABLET ORAL EVERY 6 HOURS PRN
COMMUNITY

## 2025-05-07 NOTE — PROGRESS NOTES
Subjective:       Patient ID: Thao Perera is a 79 y.o. female.    Chief Complaint: Pain of the Lumbar Spine (3 month lumbar f/u, had B/L L4-5,L5-S1 MBB 3/10/25 with Dr Ardon. Today patient states the MBB has helped with some relief. She is doing better than she was. Pain comes and goes, when present it is located at her waist posteriorly above her buttocks. Hemp with relief. )      History of Present Illness    Prior to meeting with the patient I reviewed the medical chart in [facility]. This included reviewing the previous progress notes from our office, review of the patient's last appointment with their primary care provider, review of any visits to the emergency room, and review of any pain management appointments or procedures.   Ms. Perera comes in today for follow-up for her lumbar spine.  She did have her 1st set of medial branch blocks with Dr. Ardon right at 2 months ago.  She had bilateral L4-5 and L5-S1 medial branch blocks.  She reports she had significant relief for 1-2 days.  Unfortunately, she has yet to be scheduled for her 2nd medial branch block at these levels.    Current Medications  Current Medications[1]    Allergies  Review of patient's allergies indicates:  No Known Allergies    Past Medical History  Past Medical History:   Diagnosis Date    Arthritis        Surgical History  Past Surgical History:   Procedure Laterality Date    APPENDECTOMY      CHOLECYSTECTOMY      EYE SURGERY Bilateral     IOL    HIP ARTHROPLASTY Right 9/13/2018    Procedure: ARTHROPLASTY, HIP;  Surgeon: Quincy Reyna MD;  Location: [facility], [place] OR;  Service: Orthopedics;  Laterality: Right;    HYSTERECTOMY      INJECTION OF ANESTHETIC AGENT AROUND MEDIAL BRANCH NERVES INNERVATING LUMBAR FACET JOINT Bilateral 3/10/2025    Procedure: Block-nerve-medial branch-lumbar;  Surgeon: Tory Ardon MD;  Location: [facility] PAIN MANAGEMENT;  Service: Pain Management;  Laterality: Bilateral;    INJECTION OF ANESTHETIC AGENT AROUND NERVE  Bilateral 3/3/2021    Procedure: BLOCK, NERVE BILATERAL L3, 4, 5 MEDIAL BRANCH BLOCK;  Surgeon: Joe Schwartz MD;  Location: [facility], [place], operated by [facility] PAIN MGT;  Service: Pain Management;  Laterality: Bilateral;  NEEDS CONSENT    INJECTION OF ANESTHETIC AGENT AROUND NERVE Bilateral 4/28/2021    Procedure: BLOCK, NERVE BILATERAL L3, 4, 5 MEDIAL BRANCH BLOCK;  Surgeon: Joe Schwartz MD;  Location: [facility], [place], operated by [facility] PAIN MGT;  Service: Pain Management;  Laterality: Bilateral;  NEEDS CONSENT    JOINT REPLACEMENT Left     hip, bilateral knees    TONSILLECTOMY      TRANSFORAMINAL EPIDURAL INJECTION OF STEROID Bilateral 1/6/2023    Procedure: INJECTION, STEROID, EPIDURAL, TRANSFORAMINAL APPROACH, BILATERAL L4-L5 CONTRAST Direct Ref;  Surgeon: Quirino Saucedo MD;  Location: [facility], [place], operated by [facility] PAIN MGT;  Service: Pain Management;  Laterality: Bilateral;       Family History:   No family history on file.    Social History:   Social History[2]    Hospitalization/Major Diagnostic Procedure:     Review of Systems     General/Constitutional:  Chills denies. Fatigue denies. Fever denies. Weight gain denies. Weight loss denies.    Respiratory:  Shortness of breath denies.    Cardiovascular:  Chest pain denies.    Gastrointestinal:  Constipation denies. Diarrhea denies. Nausea denies. Vomiting denies.     Hematology:  Easy bruising denies. Prolonged bleeding denies.     Genitourinary:  Frequent urination denies. Pain in lower back denies. Painful urination denies.     Musculoskeletal:  See HPI for details    Skin:  Rash denies.    Neurologic:  Dizziness denies. Gait abnormalities denies. Seizures denies. Tingling/Numbess denies.    Psychiatric:  Anxiety denies. Depressed mood denies.     Objective:   Vital Signs: There were no vitals filed for this visit.     Physical Exam      General Examination:     Constitutional: The patient is alert and oriented to lace person and time. Mood is pleasant.     Head/Face: Normal facial features normal eyebrows    Eyes: Normal  extraocular motion bilaterally    Lungs: Respirations are equal and unlabored    Gait is coordinated.    Cardiovascular: There are no swelling or varicosities present.    Lymphatic: Negative for adenopathy    Skin: Normal    Neurological: Level of consciousness normal. Oriented to place person and time and situation    Psychiatric: Oriented to time place person and situation    Lumbar exam:  No real change in his lumbar exam from her last visit with us right at 2 months ago.  Skin to lower back clean dry and intact.  No erythema or ecchymosis.  No signs or symptoms of infection.  Neurovascularly intact throughout bilateral lower extremities.  She comes in today ambulating via wheelchair.  Markedly restricted lumbar range of motion through all planes secondary to pain and guarding.  Negative Homans bilaterally.    XRAY Report/ Interpretation:  No new radiographs taken on today's clinic visit.    Assessment:       1. Lumbar facet arthropathy    2. Facet arthritis of lumbar region    3. Lumbar foraminal stenosis    4. Spondylolisthesis of lumbar region        Plan:       Thao was seen today for pain.    Diagnoses and all orders for this visit:    Lumbar facet arthropathy    Facet arthritis of lumbar region    Lumbar foraminal stenosis    Spondylolisthesis of lumbar region         No follow-ups on file.  This is to attest that the medical assistant, Ethan Gold served in the capacity as a scribe for this patient's encounter.  This is also verify that I have reviewed the patient's history and  formulated the treatment plan for this patient.  I have  evaluated this patient  and formulated a treatment plan for this patient visit.  The treatment plan and medical decision-making is outlined below  We will reach out to Dr. Ardon's office to get her scheduled for her 2nd medial branch block.  I would anticipate a similar response to her 1st medial branch block.  Then the next step would be to proceed with the rhizotomy  procedure.  We will see her back after this is completed to assess her response.  After her visit we spoke with Dr. Ardon's office in her nurse and they will call her tomorrow to schedule her 2nd medial branch blocks and possible rhizotomy based on temporary relief of his symptoms with the medial branch blocks.    Return 6 8 we  Treatment options were discussed with regards to the nature of the medical condition. Conservative pain intervention and surgical options were discussed in detail. The probability of success of each separate treatment option was discussed. The patient expressed a clear understanding of the treatment options. With regards to surgery, the procedure risk, benefits, complications, and outcomes were discussed. No guarantees were given with regards to surgical outcome.   The risk of complications, morbidity, and mortality of patient management decisions have been made at the time of this visit. These are associated with the patient's problems, diagnostic procedures and treatment options. This includes the possible management options selected and those considered but not selected by the patient after shared medical decision making we discussed with the patient.     This note was created using Dragon voice recognition software that occasionally misinterpreted phrases or words.         [1]   Current Outpatient Medications   Medication Sig Dispense Refill    acetaminophen (TYLENOL) 500 MG tablet Take 500 mg by mouth.      donepeziL (ARICEPT) 5 MG tablet Take 5 mg by mouth.      furosemide (LASIX) 20 MG tablet Take 20 mg by mouth once daily.      memantine (NAMENDA) 10 MG Tab Take 5 mg by mouth 2 (two) times daily.      pregabalin (LYRICA) 100 MG capsule Take 100 mg by mouth 2 (two) times daily.      pregabalin (LYRICA) 75 MG capsule Take 75 mg by mouth.      XARELTO 15 mg Tab Take 15 mg by mouth 2 (two) times daily.      ibuprofen (ADVIL,MOTRIN) 200 MG tablet Take 200 mg by mouth every 6 (six) hours as  needed.       No current facility-administered medications for this visit.     Facility-Administered Medications Ordered in Other Visits   Medication Dose Route Frequency Provider Last Rate Last Admin    sodium chloride 0.9% flush 3 mL  3 mL Intravenous PRN Quincy Reyna MD       [2]   Social History  Socioeconomic History    Marital status: Single   Tobacco Use    Smoking status: Never     Passive exposure: Never    Smokeless tobacco: Never   Substance and Sexual Activity    Alcohol use: Yes     Alcohol/week: 1.0 standard drink of alcohol     Types: 1 Glasses of wine per week     Comment: occasionally    Drug use: No

## 2025-05-07 NOTE — TELEPHONE ENCOUNTER
Pt never received a call after 03/10 for MBB relief and saw Dr Denis today will call her tomorrow to get relief and schedule her 2nd block this nurse was out of the office that week.

## 2025-05-08 NOTE — TELEPHONE ENCOUNTER
Spoke with caregiver and scheduled for 05/16/2025 pt had 100% relief from her procedure on 03/10 x several hours starting pain score was 8 and ending pain score was 0 . Pain currently is back to an 8 scheduled for 05/16 instructions given

## 2025-05-15 ENCOUNTER — TELEPHONE (OUTPATIENT)
Dept: PAIN MEDICINE | Facility: CLINIC | Age: 80
End: 2025-05-15
Payer: COMMERCIAL

## 2025-05-15 NOTE — TELEPHONE ENCOUNTER
Spoke with patient's caregiver and provided surgery time as well as arrival time. Caregiver voiced understanding.

## 2025-05-15 NOTE — TELEPHONE ENCOUNTER
----- Message from Radha sent at 5/15/2025  3:38 PM CDT -----  Contact: [phone] Elizabeth  .[email or web address] Patient is calling for Medical Advice regarding:caregiver is calling in regards to the procedure for tomorrow How long has patient had these symptoms:Pharmacy name and phone#:Patient wants a call back or [facility]:call back Comments:She states no one has called in regards to this for the pt yet please advise the procedure is for tomorrow Please advise patient replies from provider may take up to 48 hours.

## 2025-05-16 ENCOUNTER — HOSPITAL ENCOUNTER (OUTPATIENT)
Facility: HOSPITAL | Age: 80
Discharge: HOME | End: 2025-05-16
Attending: STUDENT IN AN ORGANIZED HEALTH CARE EDUCATION/TRAINING PROGRAM | Admitting: STUDENT IN AN ORGANIZED HEALTH CARE EDUCATION/TRAINING PROGRAM
Payer: COMMERCIAL

## 2025-05-16 VITALS
OXYGEN SATURATION: 97 % | RESPIRATION RATE: 19 BRPM | TEMPERATURE: 98 F | DIASTOLIC BLOOD PRESSURE: 56 MMHG | HEIGHT: 67 IN | WEIGHT: 255.94 LBS | HEART RATE: 47 BPM | SYSTOLIC BLOOD PRESSURE: 112 MMHG | BODY MASS INDEX: 40.17 KG/M2

## 2025-05-16 DIAGNOSIS — M47.817 SPONDYLOSIS OF LUMBOSACRAL REGION WITHOUT MYELOPATHY OR RADICULOPATHY: Primary | ICD-10-CM

## 2025-05-16 DIAGNOSIS — M47.896 OTHER SPONDYLOSIS, LUMBAR REGION: ICD-10-CM

## 2025-05-16 PROCEDURE — 64494 INJ PARAVERT F JNT L/S 2 LEV: CPT | Mod: 50 | Performed by: STUDENT IN AN ORGANIZED HEALTH CARE EDUCATION/TRAINING PROGRAM

## 2025-05-16 PROCEDURE — 63600175 PHARM REV CODE 636 W HCPCS: Performed by: STUDENT IN AN ORGANIZED HEALTH CARE EDUCATION/TRAINING PROGRAM

## 2025-05-16 PROCEDURE — 99152 MOD SED SAME PHYS/QHP 5/>YRS: CPT | Performed by: STUDENT IN AN ORGANIZED HEALTH CARE EDUCATION/TRAINING PROGRAM

## 2025-05-16 PROCEDURE — 64494 INJ PARAVERT F JNT L/S 2 LEV: CPT | Mod: 50,,, | Performed by: STUDENT IN AN ORGANIZED HEALTH CARE EDUCATION/TRAINING PROGRAM

## 2025-05-16 PROCEDURE — 64493 INJ PARAVERT F JNT L/S 1 LEV: CPT | Mod: 50,,, | Performed by: STUDENT IN AN ORGANIZED HEALTH CARE EDUCATION/TRAINING PROGRAM

## 2025-05-16 PROCEDURE — 64493 INJ PARAVERT F JNT L/S 1 LEV: CPT | Mod: 50 | Performed by: STUDENT IN AN ORGANIZED HEALTH CARE EDUCATION/TRAINING PROGRAM

## 2025-05-16 RX ORDER — SODIUM CHLORIDE, SODIUM LACTATE, POTASSIUM CHLORIDE, CALCIUM CHLORIDE 600; 310; 30; 20 MG/100ML; MG/100ML; MG/100ML; MG/100ML
INJECTION, SOLUTION INTRAVENOUS CONTINUOUS
Status: DISCONTINUED | OUTPATIENT
Start: 2025-05-16 | End: 2025-05-16 | Stop reason: HOSPADM

## 2025-05-16 RX ORDER — LIDOCAINE HYDROCHLORIDE 10 MG/ML
1 INJECTION, SOLUTION EPIDURAL; INFILTRATION; INTRACAUDAL; PERINEURAL ONCE
Status: COMPLETED | OUTPATIENT
Start: 2025-05-16 | End: 2025-05-16

## 2025-05-16 RX ORDER — BUPIVACAINE HYDROCHLORIDE 2.5 MG/ML
INJECTION, SOLUTION EPIDURAL; INFILTRATION; INTRACAUDAL; PERINEURAL
Status: DISCONTINUED | OUTPATIENT
Start: 2025-05-16 | End: 2025-05-16 | Stop reason: HOSPADM

## 2025-05-16 RX ORDER — LIDOCAINE HYDROCHLORIDE 10 MG/ML
INJECTION, SOLUTION EPIDURAL; INFILTRATION; INTRACAUDAL; PERINEURAL
Status: DISCONTINUED | OUTPATIENT
Start: 2025-05-16 | End: 2025-05-16 | Stop reason: HOSPADM

## 2025-05-16 RX ORDER — MIDAZOLAM HYDROCHLORIDE 1 MG/ML
INJECTION INTRAMUSCULAR; INTRAVENOUS
Status: DISCONTINUED | OUTPATIENT
Start: 2025-05-16 | End: 2025-05-16 | Stop reason: HOSPADM

## 2025-05-16 RX ADMIN — LIDOCAINE HYDROCHLORIDE 10 MG: 10 INJECTION, SOLUTION EPIDURAL; INFILTRATION; INTRACAUDAL at 08:05

## 2025-05-16 NOTE — OP NOTE
Patient: Thao Perera                                                    MRN: [id number]  : 1945                                              Date of procedure: 2025        Pre Procedure Diagnosis: Back pain, Lumbar spondylosis without myelopathy/ lumbosacral region    Post Procedure Diagnosis: Same    Procedure: Bilateral Lumbar Medial Branch Nerve Block for L4-5 and L5-S1 joints under Fluoroscopy     Attending: Tory Ardon MD    Local Anesthetic Injected: 1% Lidocaine 10 ml    Anxiolysis Medications: Versed    Estimated Blood Loss: None    Complication: None        Procedure:  After informed consent was obtained, patient was taken to the fluoroscopy suite and placed in a prone position.  The skin was prepped and draped in the usual sterile fashion using chlorhexidine.  Anatomical landmarks were identified with the aid of fluoroscopy in PA and Oblique views, and the skin and subcutaneous tissue were infiltrated with a total of 10mL of 1% Lidocaine via a 25-gauge 1.5inch needle at each of the intended entry sites.  Subsequently, three 22-gauge 3.5inch needles were advanced with the aid of fluoroscopy such that their tips were located at the respective positions of the superior medial border of the transverse processes with the posterior elements at each level.  Needle placement was confirmed with the aid of fluoroscopy.  After negative aspiration, 0.5mL of 0.25% Bupivicaine was injected at each level.  This was repeated on the other side. There were no complications or paresthesias.   Patient tolerated the procedure well and all needles were removed intact.    Patient was observed in recovery and discharged home with written instruction under supervision in stable condition.

## 2025-05-16 NOTE — PLAN OF CARE
Patient is being driven home by her daughter. Her pain diary was reviewed and is being sent home with her.

## 2025-05-16 NOTE — DISCHARGE SUMMARY
[facility] - Periop Services  Discharge Note  Short Stay    Procedure(s) (LRB):  Block-nerve-medial branch-lumbar l4/5 and l5/s1 MBB #2 (Bilateral)      OUTCOME: Patient tolerated treatment/procedure well without complication and is now ready for discharge.    DISPOSITION: Home or Self Care    FINAL DIAGNOSIS:  <principal problem not specified>    FOLLOWUP: In clinic    DISCHARGE INSTRUCTIONS:    Discharge Procedure Orders   Notify your health care provider if you experience any of the following:  temperature >100.4     Notify your health care provider if you experience any of the following:  severe uncontrolled pain     Notify your health care provider if you experience any of the following:  redness, tenderness, or signs of infection (pain, swelling, redness, odor or green/yellow discharge around incision site)     Activity as tolerated        TIME SPENT ON DISCHARGE: 20 minutes

## 2025-05-16 NOTE — H&P (VIEW-ONLY)
CC: back pain    HPI: The patient is a 79 y.o. female with a history of back pain here for B MBB L4-5 and L5-S1. There are no major changes in history and physical from 2/14/25 by Dr. Ardon.    Past Medical History:   Diagnosis Date    Arthritis        Past Surgical History:   Procedure Laterality Date    APPENDECTOMY      CHOLECYSTECTOMY      EYE SURGERY Bilateral     IOL    HIP ARTHROPLASTY Right 9/13/2018    Procedure: ARTHROPLASTY, HIP;  Surgeon: Quincy Reyna MD;  Location: [facility] OR;  Service: Orthopedics;  Laterality: Right;    HYSTERECTOMY      INJECTION OF ANESTHETIC AGENT AROUND MEDIAL BRANCH NERVES INNERVATING LUMBAR FACET JOINT Bilateral 3/10/2025    Procedure: Block-nerve-medial branch-lumbar;  Surgeon: Tory Ardon MD;  Location: [facility] PAIN MANAGEMENT;  Service: Pain Management;  Laterality: Bilateral;    INJECTION OF ANESTHETIC AGENT AROUND NERVE Bilateral 3/3/2021    Procedure: BLOCK, NERVE BILATERAL L3, 4, 5 MEDIAL BRANCH BLOCK;  Surgeon: Joe Schwartz MD;  Location: [facility] PAIN MGT;  Service: Pain Management;  Laterality: Bilateral;  NEEDS CONSENT    INJECTION OF ANESTHETIC AGENT AROUND NERVE Bilateral 4/28/2021    Procedure: BLOCK, NERVE BILATERAL L3, 4, 5 MEDIAL BRANCH BLOCK;  Surgeon: Joe Schwartz MD;  Location: [facility] PAIN MGT;  Service: Pain Management;  Laterality: Bilateral;  NEEDS CONSENT    JOINT REPLACEMENT Left     hip, bilateral knees    TONSILLECTOMY      TRANSFORAMINAL EPIDURAL INJECTION OF STEROID Bilateral 1/6/2023    Procedure: INJECTION, STEROID, EPIDURAL, TRANSFORAMINAL APPROACH, BILATERAL L4-L5 CONTRAST Direct Ref;  Surgeon: Quirino Saucedo MD;  Location: [facility] PAIN MGT;  Service: Pain Management;  Laterality: Bilateral;       No family history on file.    Social History     Socioeconomic History    Marital status: Single   Tobacco Use    Smoking status: Never     Passive exposure: Never    Smokeless tobacco: Never   Substance and Sexual Activity    Alcohol use: Yes  "    Alcohol/week: 1.0 standard drink of alcohol     Types: 1 Glasses of wine per week     Comment: occasionally    Drug use: No       Current Medications[1]    Review of patient's allergies indicates:  No Known Allergies    Vitals:    05/12/25 1010   Weight: 116.1 kg (255 lb 15.3 oz)   Height: 5' 7" (1.702 m)       REVIEW OF SYSTEMS:     GENERAL: No weight loss, malaise or fevers.  HEENT:  No recent changes in vision or hearing  NECK: Negative for lumps, no difficulty with swallowing.  RESPIRATORY: Negative for cough, wheezing or shortness of breath, patient denies any recent URI.  CARDIOVASCULAR: Negative for chest pain, leg swelling or palpitations.  GI: Negative for abdominal discomfort, blood in stools or black stools or change in bowel habits.  MUSCULOSKELETAL: See HPI.  SKIN: Negative for lesions, rash, and itching.  PSYCH: No suicidal or homicidal ideations, no current mood disturbances.  HEMATOLOGY/LYMPHOLOGY: Negative for prolonged bleeding, bruising easily or swollen nodes. Patient is not currently taking any anti-coagulants  ENDO: No history of diabetes or thyroid dysfunction  NEURO: No history of syncope, paralysis, seizures or tremors.All other reviewed and negative other than HPI.    Physical exam:  Gen: A and O x3, pleasant, well-groomed  Skin: No rashes or obvious lesions  HEENT: PERRLA, no obvious deformities on ears or in canals. No thyroid masses, trachea midline, no palpable lymph nodes in neck, axilla.  CVS: Regular rate and rhythm, normal S1 and S2, no murmurs.  Resp: Clear to auscultation bilaterally.  Abdomen: Soft, NT/ND, normal bowel sounds present.  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 myelopathy or radiculopathy  -     Place in Outpatient; Standing  -     Vital signs; Standing  -     Verify informed consent; Standing  -     Saline lock IV; Standing  -     Notify physician ; Standing  -     Notify physician ; Standing  -    "  Notify physician (specify); Standing  -     Notify physician (specify); Standing  -     Notify physician (specify); Standing  -     Diet NPO; Standing  -     POCT urine pregnancy; Standing  -     POCT glucose; Standing    Other spondylosis, lumbar region    Other orders  -     LIDOcaine (PF) 10 mg/ml (1%) injection 10 mg  -     lactated ringers infusion  -     IP VTE HIGH RISK PATIENT; Standing          PLAN: B MBB L4-5 and L5-S1       This patient has been cleared for surgery in an ambulatory surgical facility    ASA 3,  Mallampatti Score 3  No history of anesthetic complications  Plan for RN IV sedation        [1]   No current facility-administered medications for this encounter.     Facility-Administered Medications Ordered in Other Encounters   Medication Dose Route Frequency Provider Last Rate Last Admin    sodium chloride 0.9% flush 3 mL  3 mL Intravenous PRQuincy Kingsley MD

## 2025-05-16 NOTE — OR NURSING
Noted patient to be bradycardic upon rolling onto procedure table without signs or symptoms of hypotension.  VS remained stable and Dr Ardon was made aware.  HR was 38 and quickly resolved to 58.  No apparent distress was noted.

## 2025-05-16 NOTE — H&P
CC: back pain    HPI: The patient is a 79 y.o. female with a history of back pain here for B MBB L4-5 and L5-S1. There are no major changes in history and physical from 2/14/25 by Dr. Ardon.    Past Medical History:   Diagnosis Date    Arthritis        Past Surgical History:   Procedure Laterality Date    APPENDECTOMY      CHOLECYSTECTOMY      EYE SURGERY Bilateral     IOL    HIP ARTHROPLASTY Right 9/13/2018    Procedure: ARTHROPLASTY, HIP;  Surgeon: Quincy Reyna MD;  Location: [facility] OR;  Service: Orthopedics;  Laterality: Right;    HYSTERECTOMY      INJECTION OF ANESTHETIC AGENT AROUND MEDIAL BRANCH NERVES INNERVATING LUMBAR FACET JOINT Bilateral 3/10/2025    Procedure: Block-nerve-medial branch-lumbar;  Surgeon: Tory Ardon MD;  Location: [facility] PAIN MANAGEMENT;  Service: Pain Management;  Laterality: Bilateral;    INJECTION OF ANESTHETIC AGENT AROUND NERVE Bilateral 3/3/2021    Procedure: BLOCK, NERVE BILATERAL L3, 4, 5 MEDIAL BRANCH BLOCK;  Surgeon: oJe Schwartz MD;  Location: [facility] PAIN MGT;  Service: Pain Management;  Laterality: Bilateral;  NEEDS CONSENT    INJECTION OF ANESTHETIC AGENT AROUND NERVE Bilateral 4/28/2021    Procedure: BLOCK, NERVE BILATERAL L3, 4, 5 MEDIAL BRANCH BLOCK;  Surgeon: Joe Schwartz MD;  Location: [facility] PAIN MGT;  Service: Pain Management;  Laterality: Bilateral;  NEEDS CONSENT    JOINT REPLACEMENT Left     hip, bilateral knees    TONSILLECTOMY      TRANSFORAMINAL EPIDURAL INJECTION OF STEROID Bilateral 1/6/2023    Procedure: INJECTION, STEROID, EPIDURAL, TRANSFORAMINAL APPROACH, BILATERAL L4-L5 CONTRAST Direct Ref;  Surgeon: Quirino Saucedo MD;  Location: [facility] PAIN MGT;  Service: Pain Management;  Laterality: Bilateral;       No family history on file.    Social History     Socioeconomic History    Marital status: Single   Tobacco Use    Smoking status: Never     Passive exposure: Never    Smokeless tobacco: Never   Substance and Sexual Activity    Alcohol use: Yes  "    Alcohol/week: 1.0 standard drink of alcohol     Types: 1 Glasses of wine per week     Comment: occasionally    Drug use: No       Current Medications[1]    Review of patient's allergies indicates:  No Known Allergies    Vitals:    05/12/25 1010   Weight: 116.1 kg (255 lb 15.3 oz)   Height: 5' 7" (1.702 m)       REVIEW OF SYSTEMS:     GENERAL: No weight loss, malaise or fevers.  HEENT:  No recent changes in vision or hearing  NECK: Negative for lumps, no difficulty with swallowing.  RESPIRATORY: Negative for cough, wheezing or shortness of breath, patient denies any recent URI.  CARDIOVASCULAR: Negative for chest pain, leg swelling or palpitations.  GI: Negative for abdominal discomfort, blood in stools or black stools or change in bowel habits.  MUSCULOSKELETAL: See HPI.  SKIN: Negative for lesions, rash, and itching.  PSYCH: No suicidal or homicidal ideations, no current mood disturbances.  HEMATOLOGY/LYMPHOLOGY: Negative for prolonged bleeding, bruising easily or swollen nodes. Patient is not currently taking any anti-coagulants  ENDO: No history of diabetes or thyroid dysfunction  NEURO: No history of syncope, paralysis, seizures or tremors.All other reviewed and negative other than HPI.    Physical exam:  Gen: A and O x3, pleasant, well-groomed  Skin: No rashes or obvious lesions  HEENT: PERRLA, no obvious deformities on ears or in canals. No thyroid masses, trachea midline, no palpable lymph nodes in neck, axilla.  CVS: Regular rate and rhythm, normal S1 and S2, no murmurs.  Resp: Clear to auscultation bilaterally.  Abdomen: Soft, NT/ND, normal bowel sounds present.  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 myelopathy or radiculopathy  -     Place in Outpatient; Standing  -     Vital signs; Standing  -     Verify informed consent; Standing  -     Saline lock IV; Standing  -     Notify physician ; Standing  -     Notify physician ; Standing  -    "  Notify physician (specify); Standing  -     Notify physician (specify); Standing  -     Notify physician (specify); Standing  -     Diet NPO; Standing  -     POCT urine pregnancy; Standing  -     POCT glucose; Standing    Other spondylosis, lumbar region    Other orders  -     LIDOcaine (PF) 10 mg/ml (1%) injection 10 mg  -     lactated ringers infusion  -     IP VTE HIGH RISK PATIENT; Standing          PLAN: B MBB L4-5 and L5-S1       This patient has been cleared for surgery in an ambulatory surgical facility    ASA 3,  Mallampatti Score 3  No history of anesthetic complications  Plan for RN IV sedation        [1]   No current facility-administered medications for this encounter.     Facility-Administered Medications Ordered in Other Encounters   Medication Dose Route Frequency Provider Last Rate Last Admin    sodium chloride 0.9% flush 3 mL  3 mL Intravenous PRQuincy Kingsley MD

## 2025-05-19 ENCOUNTER — TELEPHONE (OUTPATIENT)
Dept: PAIN MEDICINE | Facility: CLINIC | Age: 80
End: 2025-05-19
Payer: COMMERCIAL

## 2025-05-19 DIAGNOSIS — M47.817 SPONDYLOSIS OF LUMBOSACRAL REGION WITHOUT MYELOPATHY OR RADICULOPATHY: Primary | ICD-10-CM

## 2025-05-19 NOTE — TELEPHONE ENCOUNTER
Regarding your Lumbar Medial Branch Block , For Date of Service:  05/16    Please answer the following questions:    1. What percentage of pain relief did you receive following the block, from 0-100%?     2. How many hours did pain relief last following the block?      3. During this time please describe in detail the activities you were able to do?    4. Pain score from 0-10 pre procedure -      5. Pain score from 0-10 after the  block  -     6. Return to baseline pain score of 0-10 once the block wore off -

## 2025-05-20 NOTE — TELEPHONE ENCOUNTER
Spoke with caregiver and she states pt had 80-90% relief from pain x 3-4 hours  her starting pain score was 7 and ending pain score 0 her current pain score  is a 5. Scheduled for 06/09 for RFA

## 2025-06-09 ENCOUNTER — HOSPITAL ENCOUNTER (OUTPATIENT)
Facility: HOSPITAL | Age: 80
Discharge: HOME | End: 2025-06-09
Attending: STUDENT IN AN ORGANIZED HEALTH CARE EDUCATION/TRAINING PROGRAM | Admitting: STUDENT IN AN ORGANIZED HEALTH CARE EDUCATION/TRAINING PROGRAM
Payer: COMMERCIAL

## 2025-06-09 DIAGNOSIS — M47.817 SPONDYLOSIS OF LUMBOSACRAL REGION WITHOUT MYELOPATHY OR RADICULOPATHY: Primary | ICD-10-CM

## 2025-06-09 DIAGNOSIS — M47.896 OTHER SPONDYLOSIS, LUMBAR REGION: ICD-10-CM

## 2025-06-09 PROCEDURE — 99152 MOD SED SAME PHYS/QHP 5/>YRS: CPT | Performed by: STUDENT IN AN ORGANIZED HEALTH CARE EDUCATION/TRAINING PROGRAM

## 2025-06-09 PROCEDURE — 64636 DESTROY L/S FACET JNT ADDL: CPT | Mod: 50 | Performed by: STUDENT IN AN ORGANIZED HEALTH CARE EDUCATION/TRAINING PROGRAM

## 2025-06-09 PROCEDURE — 99153 MOD SED SAME PHYS/QHP EA: CPT | Performed by: STUDENT IN AN ORGANIZED HEALTH CARE EDUCATION/TRAINING PROGRAM

## 2025-06-09 PROCEDURE — 63600175 PHARM REV CODE 636 W HCPCS: Performed by: STUDENT IN AN ORGANIZED HEALTH CARE EDUCATION/TRAINING PROGRAM

## 2025-06-09 PROCEDURE — 64635 DESTROY LUMB/SAC FACET JNT: CPT | Mod: 50,,, | Performed by: STUDENT IN AN ORGANIZED HEALTH CARE EDUCATION/TRAINING PROGRAM

## 2025-06-09 PROCEDURE — 64635 DESTROY LUMB/SAC FACET JNT: CPT | Mod: 50 | Performed by: STUDENT IN AN ORGANIZED HEALTH CARE EDUCATION/TRAINING PROGRAM

## 2025-06-09 PROCEDURE — 64636 DESTROY L/S FACET JNT ADDL: CPT | Mod: 50,,, | Performed by: STUDENT IN AN ORGANIZED HEALTH CARE EDUCATION/TRAINING PROGRAM

## 2025-06-09 RX ORDER — DEXAMETHASONE SODIUM PHOSPHATE 10 MG/ML
INJECTION, SOLUTION INTRA-ARTICULAR; INTRALESIONAL; INTRAMUSCULAR; INTRAVENOUS; SOFT TISSUE
Status: DISCONTINUED | OUTPATIENT
Start: 2025-06-09 | End: 2025-06-09 | Stop reason: HOSPADM

## 2025-06-09 RX ORDER — BUPIVACAINE HYDROCHLORIDE 2.5 MG/ML
INJECTION, SOLUTION EPIDURAL; INFILTRATION; INTRACAUDAL; PERINEURAL
Status: DISCONTINUED | OUTPATIENT
Start: 2025-06-09 | End: 2025-06-09 | Stop reason: HOSPADM

## 2025-06-09 RX ORDER — SODIUM CHLORIDE, SODIUM LACTATE, POTASSIUM CHLORIDE, CALCIUM CHLORIDE 600; 310; 30; 20 MG/100ML; MG/100ML; MG/100ML; MG/100ML
INJECTION, SOLUTION INTRAVENOUS CONTINUOUS
Status: DISCONTINUED | OUTPATIENT
Start: 2025-06-09 | End: 2025-06-09

## 2025-06-09 RX ORDER — MIDAZOLAM HYDROCHLORIDE 1 MG/ML
INJECTION INTRAMUSCULAR; INTRAVENOUS
Status: DISCONTINUED | OUTPATIENT
Start: 2025-06-09 | End: 2025-06-09 | Stop reason: HOSPADM

## 2025-06-09 RX ORDER — FENTANYL CITRATE 50 UG/ML
INJECTION, SOLUTION INTRAMUSCULAR; INTRAVENOUS
Status: DISCONTINUED | OUTPATIENT
Start: 2025-06-09 | End: 2025-06-09 | Stop reason: HOSPADM

## 2025-06-09 RX ORDER — LIDOCAINE HYDROCHLORIDE 10 MG/ML
INJECTION, SOLUTION EPIDURAL; INFILTRATION; INTRACAUDAL; PERINEURAL
Status: DISCONTINUED | OUTPATIENT
Start: 2025-06-09 | End: 2025-06-09 | Stop reason: HOSPADM

## 2025-06-09 RX ORDER — LIDOCAINE HYDROCHLORIDE 10 MG/ML
1 INJECTION, SOLUTION EPIDURAL; INFILTRATION; INTRACAUDAL; PERINEURAL ONCE
Status: COMPLETED | OUTPATIENT
Start: 2025-06-09 | End: 2025-06-09

## 2025-06-09 RX ADMIN — LIDOCAINE HYDROCHLORIDE 10 MG: 10 INJECTION, SOLUTION EPIDURAL; INFILTRATION; INTRACAUDAL at 11:06

## 2025-06-09 NOTE — OP NOTE
Patient: Thao Perera                                                    MRN: [id number]  : 1945                                              Date of procedure: 2025      Pre Procedure Diagnosis: Low back pain, Lumbago, Lumbar spondylosis without myelopathy/ lumbrosacral region    Post Procedure Diagnosis: Same    Procedure: Bilateral Lumbar Medial Branch Nerve Rhizotomy for  L4-5 and L5-S1 joints under Fluoroscopy     Attending: Tory Ardon MD    Local Anesthetic Injected: 1% Lidocaine 10 ml    Sedation Medications: See RN note    Estimated Blood Loss: None    Complication: None      Procedure:  After informed consent was obtained, patient was taken to the fluoroscopy suite and placed in a prone position.  The skin was prepped and draped in the usual sterile fashion using chlorhexidine.  Anatomical landmarks were identified with the aid of fluoroscopy in PA and Oblique views, and the skin and subcutaneous tissue were infiltrated with a total of 5mL of 1% Lidocaine via a 25-gauge 1.5inch needle at each of the intended entry sites.  Subsequently, three 22-gauge 100mm 10mm tip introducer needles were advanced with the aid of fluoroscopy such that their tips were located at the respective positions of the superior medial border of the transverse processes with the posterior elements at each level.  Needle placement was confirmed with the aid of fluoroscopy.  L5 dorsal ramus on Left was skipped due to proximity to stimulator lead. Motor stimulation up to 2 Volts at each level confirmed no motor nerve involvement.  Impedance was less than 800 ohms at each level.  1ml of 2% lidocaine was instilled prior to lesioning.  Ablation was performed per level utilizing radiofrequency generator 80°C for 90 seconds. Then 0.5mL of a mixture of 0.5mL of Dexamethasone 10mg/mL and 2mL of 0.25% Bupivacaine was injected at each level.  There were no complications or paresthesias.   Patient tolerated the procedure well and all  needles were removed intact.    Patient was observed in recovery and discharged home with written instruction under supervision in stable condition.

## 2025-06-09 NOTE — DISCHARGE SUMMARY
[facility] - Periop Services  Discharge Note  Short Stay    Procedure(s) (LRB):  Radiofrequency Ablation, Nerve, Spinal, Lumbar, Medial Branch, 1 Level (Bilateral)      OUTCOME: Patient tolerated treatment/procedure well without complication and is now ready for discharge.    DISPOSITION: Home or Self Care    FINAL DIAGNOSIS:  <principal problem not specified>    FOLLOWUP: In clinic    DISCHARGE INSTRUCTIONS:    Discharge Procedure Orders   Notify your health care provider if you experience any of the following:  temperature >100.4     Notify your health care provider if you experience any of the following:  severe uncontrolled pain     Notify your health care provider if you experience any of the following:  redness, tenderness, or signs of infection (pain, swelling, redness, odor or green/yellow discharge around incision site)     Activity as tolerated        TIME SPENT ON DISCHARGE: 20 minutes

## 2025-06-09 NOTE — PLAN OF CARE
VSS no c/o pain nausea or sob.  Discharge instructions reviewed with pt and friend.  Questions answered and pt verbalizes understanding.

## 2025-06-10 VITALS
HEIGHT: 67 IN | BODY MASS INDEX: 40.17 KG/M2 | WEIGHT: 255.94 LBS | SYSTOLIC BLOOD PRESSURE: 117 MMHG | TEMPERATURE: 98 F | HEART RATE: 45 BPM | OXYGEN SATURATION: 97 % | DIASTOLIC BLOOD PRESSURE: 59 MMHG | RESPIRATION RATE: 20 BRPM

## 2025-07-14 ENCOUNTER — OFFICE VISIT (OUTPATIENT)
Dept: ORTHOPEDICS | Facility: CLINIC | Age: 80
End: 2025-07-14
Payer: COMMERCIAL

## 2025-07-14 VITALS — WEIGHT: 255.94 LBS | HEIGHT: 67 IN | BODY MASS INDEX: 40.17 KG/M2

## 2025-07-14 DIAGNOSIS — M47.816 LUMBAR FACET ARTHROPATHY: Primary | ICD-10-CM

## 2025-07-14 PROCEDURE — 1160F RVW MEDS BY RX/DR IN RCRD: CPT | Mod: CPTII,S$GLB,, | Performed by: ORTHOPAEDIC SURGERY

## 2025-07-14 PROCEDURE — 99999 PR PBB SHADOW E&M-EST. PATIENT-LVL III: CPT | Mod: PBBFAC,,, | Performed by: ORTHOPAEDIC SURGERY

## 2025-07-14 PROCEDURE — 1101F PT FALLS ASSESS-DOCD LE1/YR: CPT | Mod: CPTII,S$GLB,, | Performed by: ORTHOPAEDIC SURGERY

## 2025-07-14 PROCEDURE — 1125F AMNT PAIN NOTED PAIN PRSNT: CPT | Mod: CPTII,S$GLB,, | Performed by: ORTHOPAEDIC SURGERY

## 2025-07-14 PROCEDURE — 99213 OFFICE O/P EST LOW 20 MIN: CPT | Mod: S$GLB,,, | Performed by: ORTHOPAEDIC SURGERY

## 2025-07-14 PROCEDURE — 3288F FALL RISK ASSESSMENT DOCD: CPT | Mod: CPTII,S$GLB,, | Performed by: ORTHOPAEDIC SURGERY

## 2025-07-14 PROCEDURE — 1159F MED LIST DOCD IN RCRD: CPT | Mod: CPTII,S$GLB,, | Performed by: ORTHOPAEDIC SURGERY

## 2025-07-14 NOTE — PROGRESS NOTES
Subjective:      Patient ID: Thao Perera is a 79 y.o. female.     Chief Complaint: Pain of the Lumbar Spine (Had B/L L4-5 and L5-[id number] RFA with Reva 6/9/25, which did help, the pain comes and goes, )       History of Present Illness     Prior to meeting with the patient I reviewed the medical chart in [facility]. This included reviewing the previous progress notes from our office, review of the patient's last appointment with their primary care provider, review of any visits to the emergency room, and review of any pain management appointments or procedures.  79-year-old female following up for chronic low back pain that has been present for several years.  She has recently received a radiofrequency ablation at L4-5 and L5-S1 bilaterally performed by Dr. Ardon on June 9, 2025.  She states that she has received significant relief of her low back pain following the procedure.  She continues to experience low back pain at times although states it is manageable.  She does continue to admit to generalized core weakness as well as bilateral lower extremity weakness.  She has been working with formal physical therapy for generalized strengthening and states that she has been making slow progress.  She denies any radicular pain, numbness, tingling throughout her bilateral lower extremities.     Current Medications  Current Medications[1]     Allergies  Review of patient's allergies indicates:  No Known Allergies     Past Medical History  Past Medical History:   Diagnosis Date    Arthritis         Surgical History  Past Surgical History:   Procedure Laterality Date    APPENDECTOMY      CHOLECYSTECTOMY      EYE SURGERY Bilateral     IOL    HIP ARTHROPLASTY Right 9/13/2018    Procedure: ARTHROPLASTY, HIP;  Surgeon: Quincy Reyna MD;  Location: [facility];  Service: Orthopedics;  Laterality: Right;    HYSTERECTOMY      INJECTION OF ANESTHETIC AGENT AROUND MEDIAL BRANCH NERVES INNERVATING LUMBAR FACET JOINT Bilateral 3/10/2025     Procedure: Block-nerve-medial branch-lumbar;  Surgeon: Tory Ardon MD;  Location: [facility]IN [facility]EMENT;  Service: Pain Management;  Laterality: Bilateral;    INJECTION OF ANESTHETIC AGENT AROUND MEDIAL BRANCH NERVES INNERVATING LUMBAR FACET JOINT Bilateral 5/16/2025    Procedure: Block-nerve-medial branch-lumbar l4/5 and l5/s1 MBB #2;  Surgeon: Tory Ardon MD;  [facility];  Service: Pain Management;  Laterality: Bilateral;    INJECTION OF ANESTHETIC AGENT AROUND NERVE Bilateral 3/3/2021    Procedure: BLOCK, NERVE BILATERAL L3, 4, 5 MEDIAL BRANCH BLOCK;  Surgeon: Joe Schwartz MD;  Location: [facility] PAIN [facility]GT;  Service: Pain Management;  Laterality: Bilateral;  NEEDS CONSENT    INJECTION OF ANESTHETIC AGENT AROUND NERVE Bilateral 4/28/2021    Procedure: BLOCK, NERVE BILATERAL L3, 4, 5 MEDIAL BRANCH BLOCK;  Surgeon: Joe Schwartz MD;  Location: [facility]IN [facility];  Service: Pain Management;  Laterality: Bilateral;  NEEDS CONSENT    JOINT REPLACEMENT Left     hip, bilateral knees    RADIOFREQUENCY ABLATION OF LUMBAR MEDIAL BRANCH NERVE AT SINGLE LEVEL Bilateral 6/9/2025    Procedure: Radiofrequency Ablation, Nerve, Spinal, Lumbar, Medial Branch, 1 Level;  Surgeon: Tory Ardon MD;  [facility];  Service: Pain Management;  Laterality: Bilateral;    TONSILLECTOMY      TRANSFORAMINAL EPIDURAL INJECTION OF STEROID Bilateral 1/6/2023    Procedure: INJECTION, STEROID, EPIDURAL, TRANSFORAMINAL APPROACH, BILATERAL L4-L5 CONTRAST Direct Ref;  Surgeon: Quirino Saucedo MD;  Location: [facility];  Service: Pain Management;  Laterality: Bilateral;        Family History:  No family history on file.     Social History:  Social History[2]     Hospitalization/Major Diagnostic Procedure:     Review of Systems      General/Constitutional:  Chills denies. Fatigue denies. Fever denies. Weight gain denies. Weight loss denies.     Respiratory:  Shortness of breath denies.     Cardiovascular:   Chest pain denies.     Gastrointestinal:  Constipation denies. Diarrhea denies. Nausea denies. Vomiting denies.     Hematology:  Easy bruising denies. Prolonged bleeding denies.     Genitourinary:  Frequent urination denies. Pain in lower back denies. Painful urination denies.     Musculoskeletal:  See HPI for details     Skin:  Rash denies.     Neurologic:  Dizziness denies. Gait abnormalities denies. Seizures denies. Tingling/Numbess denies.     Psychiatric:  Anxiety denies. Depressed mood denies.     Objective:  Vital Signs: There were no vitals filed for this visit.     Physical Exam      General Examination:     Constitutional: The patient is alert and oriented to lace person and time. Mood is pleasant.     Head/Face: Normal facial features normal eyebrows     Eyes: Normal extraocular motion bilaterally     Lungs: Respirations are equal and unlabored     Gait is coordinated.     Cardiovascular: There are no swelling or varicosities present.     Lymphatic: Negative for adenopathy     Skin: Normal     Neurological: Level of consciousness normal. Oriented to place person and time and situation     Psychiatric: Oriented to time place person and situation     Lumbar spine exam:  Skin overlying the lumbar spine is clean dry and intact.  No erythema or ecchymosis.  No signs or symptoms of infection.  Limited range of motion of the lumbar spine and flexion-extension, rotation, lateral bending.  Tenderness to paraspinal muscles bilaterally.  Negative straight leg raise test bilaterally.  Negative Homans bilaterally.  Distally neurovascularly intact.  She is nonweightbearing at today's visit and is ambulating in a wheelchair.    XRAY Report/ Interpretation:  No new radiographs taken at today's visit        Assessment:     1. Lumbar facet arthropathy       Plan:     Thao was seen today for pain.    Diagnoses and all orders for this visit:    Lumbar facet arthropathy        No follow-ups on file.   This is to attest that  the physician assistant Tyrel Dobson served in the capacity as a scribe for this patient's encounter.  This is also to verify that I have reviewed the patient's history and helped formulate the treatment plan and discussed it with the physician assistant.  I have actively participated in the evaluation and treatment plan for this patient.  The visit plan and medical decision-making is as outlined below.  Facet joint arthritis of the lumbar spine.  She has received significant relief of her low back pain following the most recent procedure with Dr. Ardon.  She underwent bilateral L4-5 and L5-S1 radiofrequency ablations performed on June 9, 2025 which he states has made her low back pain manageable at this time.  For her bilateral lower extremity and core weakness, I would like her to continue working with physical therapy for generalized strengthening.  I would like her to follow up in 3 months to reassess her low back pain and generalized weakness.  Treatment options were discussed with regards to the nature of the medical condition. Conservative pain intervention and surgical options were discussed in detail. The probability of success of each separate treatment option was discussed. The patient expressed a clear understanding of the treatment options. With regards to surgery, the procedure risk, benefits, complications, and outcomes were discussed. No guarantees were given with regards to surgical outcome.  The risk of complications, morbidity, and mortality of patient management decisions have been made at the time of this visit. These are associated with the patient's problems, diagnostic procedures and treatment options. This includes the possible management options selected and those considered but not selected by the patient after shared medical decision making we discussed with the patient.     This note was created using Dragon voice recognition software that occasionally misinterpreted phrases or  words.         [1]   Current Outpatient Medications   Medication Sig Dispense Refill    acetaminophen (TYLENOL) 500 MG tablet Take 500 mg by mouth.      donepeziL (ARICEPT) 5 MG tablet Take 5 mg by mouth.      furosemide (LASIX) 20 MG tablet Take 20 mg by mouth once daily.      ibuprofen (ADVIL,MOTRIN) 200 MG tablet Take 200 mg by mouth every 6 (six) hours as needed.      memantine (NAMENDA) 10 MG Tab Take 5 mg by mouth 2 (two) times daily.      pregabalin (LYRICA) 100 MG capsule Take 100 mg by mouth 2 (two) times daily.      pregabalin (LYRICA) 75 MG capsule Take 75 mg by mouth.      XARELTO 15 mg Tab Take 15 mg by mouth 2 (two) times daily.       No current facility-administered medications for this visit.     Facility-Administered Medications Ordered in Other Visits   Medication Dose Route Frequency Provider Last Rate Last Admin    sodium chloride 0.9% flush 3 mL  3 mL Intravenous PRN Quincy Reyna MD       [2]   Social History  Socioeconomic History    Marital status: Single   Tobacco Use    Smoking status: Never     Passive exposure: Never    Smokeless tobacco: Never   Substance and Sexual Activity    Alcohol use: Yes     Alcohol/week: 1.0 standard drink of alcohol     Types: 1 Glasses of wine per week     Comment: occasionally    Drug use: No

## (undated) DEVICE — PAD GROUNDING DISPER ELECTRODE

## (undated) DEVICE — NDL SAFETY 25G X 1.5 ECLIPSE

## (undated) DEVICE — NDL ECLIPSE SAFETY 18GX1-1/2IN

## (undated) DEVICE — SYS LABEL CORRECT MED

## (undated) DEVICE — PILLOW SMALL ABDUCTION

## (undated) DEVICE — SYR 50CC LL

## (undated) DEVICE — TRAY FOLEY 16FR INFECTION CONT

## (undated) DEVICE — DRAPE INCISE IOBAN 2 23X17IN

## (undated) DEVICE — GLOVE SENSICARE PI ALOE 6

## (undated) DEVICE — SEE MEDLINE ITEM 153151

## (undated) DEVICE — DRAPE SURG W/TWL 17 5/8X23

## (undated) DEVICE — BLADE SAW SAG 25.40MM X 1.27MM

## (undated) DEVICE — SOL 9P NACL IRR PIC IL

## (undated) DEVICE — PILLOW ABDUCTION SM

## (undated) DEVICE — SEE MEDLINE ITEM 157117

## (undated) DEVICE — DRAPE MEDIUM SHEET 40X70IN

## (undated) DEVICE — GLOVE BIOGEL SKINSENSE PI 6.5

## (undated) DEVICE — STAPLER SKIN ROTATING HEAD

## (undated) DEVICE — TOWEL OR DISP STRL BLUE 4/PK

## (undated) DEVICE — ELECTRODE REM PLYHSV RETURN 9

## (undated) DEVICE — APPLICATOR CHLORAPREP ORN 26ML

## (undated) DEVICE — DRAPE HIP TIBURON 87X115X134

## (undated) DEVICE — GLOVE SENSICARE PI ALOE 7.5

## (undated) DEVICE — ORTHOCORD W/OS-6

## (undated) DEVICE — DRESSING LEUKOPLAST FLEX 1X3IN

## (undated) DEVICE — HOOD T-5 TEAR AWAY STERILE

## (undated) DEVICE — TAPE MEDIPORE 4IN X 2YDS

## (undated) DEVICE — Device

## (undated) DEVICE — NDL 18GA X1 1/2 REG BEVEL

## (undated) DEVICE — GLOVE BIOGEL SKINSENSE PI 8.5

## (undated) DEVICE — PAD ABD 8X10 STERILE

## (undated) DEVICE — COVER BACK TABLE 72X21

## (undated) DEVICE — DRESSING N ADH OIL EMUL 3X8

## (undated) DEVICE — CHLORAPREP 10.5 ML APPLICATOR

## (undated) DEVICE — SUT VICRYL 2-0 8-18 CP-2

## (undated) DEVICE — UNDERGLOVE BIOGEL PI SZ 6.5 LF

## (undated) DEVICE — SUT VICRYL+ 1 CTX 18IN VIOL

## (undated) DEVICE — CANNULA RADIOPAQUE 20G CURVED

## (undated) DEVICE — DRAPE STERI U-SHAPED 47X51IN

## (undated) DEVICE — GAUZE SPONGE 4X4 12PLY